# Patient Record
Sex: MALE | Race: WHITE | Employment: OTHER | URBAN - METROPOLITAN AREA
[De-identification: names, ages, dates, MRNs, and addresses within clinical notes are randomized per-mention and may not be internally consistent; named-entity substitution may affect disease eponyms.]

---

## 2019-09-30 ENCOUNTER — HOSPITAL ENCOUNTER (OUTPATIENT)
Dept: MRI IMAGING | Age: 69
Discharge: HOME | End: 2019-09-30
Payer: MEDICARE

## 2019-09-30 DIAGNOSIS — M54.50 LUMBAGO: ICD-10-CM

## 2019-09-30 DIAGNOSIS — M54.16 LUMBAR RADICULOPATHY: ICD-10-CM

## 2019-09-30 PROCEDURE — 72148 MRI LUMBAR SPINE W/O DYE: CPT

## 2020-08-31 ENCOUNTER — OFFICE VISIT (OUTPATIENT)
Dept: INTERNAL MEDICINE CLINIC | Age: 70
End: 2020-08-31
Payer: MEDICARE

## 2020-08-31 VITALS
DIASTOLIC BLOOD PRESSURE: 85 MMHG | HEART RATE: 76 BPM | SYSTOLIC BLOOD PRESSURE: 126 MMHG | TEMPERATURE: 99.1 F | BODY MASS INDEX: 36.45 KG/M2 | WEIGHT: 315 LBS | OXYGEN SATURATION: 96 % | RESPIRATION RATE: 12 BRPM | HEIGHT: 78 IN

## 2020-08-31 DIAGNOSIS — L40.9 PSORIASIS: ICD-10-CM

## 2020-08-31 DIAGNOSIS — N40.1 BENIGN PROSTATIC HYPERPLASIA WITH INCOMPLETE BLADDER EMPTYING: ICD-10-CM

## 2020-08-31 DIAGNOSIS — F51.01 PRIMARY INSOMNIA: ICD-10-CM

## 2020-08-31 DIAGNOSIS — M45.0 ANKYLOSING SPONDYLITIS OF MULTIPLE SITES IN SPINE (HCC): Primary | ICD-10-CM

## 2020-08-31 DIAGNOSIS — R39.14 BENIGN PROSTATIC HYPERPLASIA WITH INCOMPLETE BLADDER EMPTYING: ICD-10-CM

## 2020-08-31 DIAGNOSIS — E66.01 OBESITY, MORBID (HCC): ICD-10-CM

## 2020-08-31 DIAGNOSIS — I10 BENIGN ESSENTIAL HTN: ICD-10-CM

## 2020-08-31 DIAGNOSIS — G62.9 NEUROPATHY: ICD-10-CM

## 2020-08-31 PROCEDURE — G8432 DEP SCR NOT DOC, RNG: HCPCS | Performed by: INTERNAL MEDICINE

## 2020-08-31 PROCEDURE — G8417 CALC BMI ABV UP PARAM F/U: HCPCS | Performed by: INTERNAL MEDICINE

## 2020-08-31 PROCEDURE — 99204 OFFICE O/P NEW MOD 45 MIN: CPT | Performed by: INTERNAL MEDICINE

## 2020-08-31 PROCEDURE — G0463 HOSPITAL OUTPT CLINIC VISIT: HCPCS | Performed by: INTERNAL MEDICINE

## 2020-08-31 PROCEDURE — G8427 DOCREV CUR MEDS BY ELIG CLIN: HCPCS | Performed by: INTERNAL MEDICINE

## 2020-08-31 PROCEDURE — G8536 NO DOC ELDER MAL SCRN: HCPCS | Performed by: INTERNAL MEDICINE

## 2020-08-31 PROCEDURE — 1101F PT FALLS ASSESS-DOCD LE1/YR: CPT | Performed by: INTERNAL MEDICINE

## 2020-08-31 PROCEDURE — 3017F COLORECTAL CA SCREEN DOC REV: CPT | Performed by: INTERNAL MEDICINE

## 2020-08-31 RX ORDER — MELATONIN
1000 DAILY
Qty: 30 TAB | Refills: 11
Start: 2020-08-31 | End: 2022-02-01

## 2020-08-31 RX ORDER — BETAMETHASONE DIPROPIONATE 0.5 MG/G
OINTMENT TOPICAL AS NEEDED
COMMUNITY

## 2020-08-31 RX ORDER — LANOLIN ALCOHOL/MO/W.PET/CERES
1000 CREAM (GRAM) TOPICAL DAILY
Qty: 30 TAB | Refills: 11
Start: 2020-08-31 | End: 2022-02-01

## 2020-08-31 RX ORDER — TAMSULOSIN HYDROCHLORIDE 0.4 MG/1
0.4 CAPSULE ORAL DAILY
COMMUNITY
Start: 2019-07-30

## 2020-08-31 RX ORDER — FOLIC ACID 1 MG/1
TABLET ORAL
COMMUNITY
Start: 2020-07-20

## 2020-08-31 RX ORDER — VALSARTAN AND HYDROCHLOROTHIAZIDE 160; 12.5 MG/1; MG/1
1 TABLET, FILM COATED ORAL DAILY
Qty: 90 TAB | Refills: 3 | Status: SHIPPED | OUTPATIENT
Start: 2020-08-31 | End: 2021-09-03

## 2020-08-31 NOTE — PROGRESS NOTES
HISTORY OF PRESENT ILLNESS    New patient to my practice, referred to me by his Bro-in-law Arlyn Mclaughlin . Prior medical care has been from Dr. Yusra Hartman. he works as Retired on Disability from [employer] since 2003. his  past medical history was reviewed, discussed, and summarized in the list below. Single, never . No children. Helps his sister care for their 81 yo mother. Doing fairly well,  but with several chronic significant issues. He is followed closely by rheumatology Dr. Roro Dowd for ankylosing spondylitis of his neck and lumbar spine. Is currently maintained on methotrexate and Remicade injections with PRN diclofenac. Chronic back pain is followed by Dr. Kamille Merino. He has had a history of nerve ablation and several injections. Plans to follow-up again soon. Says that pool therapy helped a lot and he looks forward to getting back into the pool as soon as he can. BPH. Symptoms have remained stable. Taking Flomax. Blood work done by his rheumatologist, Dr. Roro Dowd on May 1, 2020. WBC 6.1, Hgb 14.5, platelet 797  Sodium 138, potassium 3.9, glucose 86, BUN 18, creatinine 0.7, calcium 9.2, liver enzymes normal cholesterol 158, HDL 47, triglyceride 133, LDL 84  PSA 0.6    Hypertension  Hypertension ROS: taking medications as instructed, no medication side effects noted, no TIA's, no chest pain on exertion, no dyspnea on exertion, no swelling of ankles     reports that he quit smoking about 34 years ago. He has never used smokeless tobacco.    reports current alcohol use. BP Readings from Last 2 Encounters:   08/31/20 126/85   04/09/15 126/79       Review of Systems   All other systems reviewed and are negative, except as noted in HPI      Past Medical and Surgical History  Past Medical History:   Diagnosis Date    Ankylosing spondylitis ([facility] [address] 1977    Dr. Roro Dowd.  neck, lumbar. sees pain management    Benign essential HTN     BPH (benign prostatic hyperplasia)     Dr. Gabe Michael. PSA 0.6 5/1/20    Chronic edema     compression stockings    Colon polyp     hx.  last colonoscopy 2019.  DJD (degenerative joint disease) of cervical spine     Dr. Andry Cho History of basal cell cancer 2013    face- Dr. Diane De León. MOHS    History of bilateral knee replacement     Dr. Stephen Almanza    History of DVT (deep vein thrombosis)     left leg.  Insomnia     takes ambien per Dr. Karine Romo Kidney stone     Lumbar spinal stenosis 2011    Dr Virgie Salinas, Dr. Marylene Fujisawa. MRI 9/2017. hx of injections, nerve ablation    Neuropathy     feet. due to spinal dz?  saw neurology. hx GTT    JUDY on CPAP     Dr. Josefina Verdugo         has a past surgical history that includes hx shoulder arthroscopy (Left); hx wrist fracture tx (Right); hx lithotripsy; hx knee replacement (Right); hx knee replacement (Left); hx mohs procedure (2013?); hx colonoscopy (2019); and hx colonoscopy. Current Outpatient Medications   Medication Sig    tamsulosin (FLOMAX) 0.4 mg capsule Take  by mouth daily.  folic acid (FOLVITE) 1 mg tablet TAKE 1 TABLET BY MOUTH EVERY DAY    augmented betamethasone dipropionate (DIPROLENE-AF) 0.05 % ointment Apply  to affected area as needed for Skin Irritation.  cyanocobalamin 1,000 mcg tablet Take 1 Tab by mouth daily.  cholecalciferol (VITAMIN D3) (1000 Units /25 mcg) tablet Take 1 Tab by mouth daily.  valsartan-hydroCHLOROthiazide (Diovan HCT) 160-12.5 mg per tablet Take 1 Tab by mouth daily.  methotrexate, PF, 10 mg/0.2 mL atIn Injection weekly    INFLIXIMAB (REMICADE IV) by IntraVENous route.  diclofenac EC (VOLTAREN) 75 mg EC tablet Take 75 mg by mouth. No current facility-administered medications for this visit. reports that he quit smoking about 34 years ago. He has never used smokeless tobacco. He reports current alcohol use. He reports that he does not use drugs. family history includes Arthritis-osteo in his father and sister;  Heart Disease in his father. Physical Exam   Nursing note and vitals reviewed. Blood pressure 126/85, pulse 76, temperature 99.1 °F (37.3 °C), temperature source Oral, resp. rate 12, height 6' 8\" (2.032 m), weight (!) 393 lb 6.4 oz (178.4 kg), SpO2 96 %. Constitutional: oriented to person, place, and time. No distress. Eyes: Conjunctivae are normal.   HEENT:  No cervical lymphadenopathy. No thyroid nodules or goiter  Cardiovascular: Normal rate. Regular rhythm, no murmurs, rubs. No edema  Pulmonary/Chest: Effort normal. clear to auscultation  Abdominal: soft, non-tender, non-distended  Musculoskeletal:     Neurological: Alert and oriented to person, place. Cranial nerves grossly intact. Antalgic gait. Skin: No rash noted. Psychiatric: Normal mood and affect. Behavior is normal.     ASSESSMENT and PLAN  Diagnoses and all orders for this visit:    1. Ankylosing spondylitis of multiple sites in spine ([facility] [address]  Chronic back and neck pain  This is his most pressing issue. Weight loss would reduce pain and I have strongly recommended that he return back to the pool. He is going to follow-up with Dr. Aleja Mendenhall about further interventions. -     methotrexate, PF, 10 mg/0.2 mL atIn; Injection weekly    2. Benign essential HTN  Blood pressure remains controlled. Continue current medication.  -     valsartan-hydroCHLOROthiazide (Diovan HCT) 160-12.5 mg per tablet; Take 1 Tab by mouth daily. 3. Benign prostatic hyperplasia with incomplete bladder emptying  Symptoms are stable on Flomax and PSA is stable. I am happy to follow this yearly or he can follow-up with urology. 4. Obesity, morbid ([facility] [address]  The patient is asked to make an attempt to improve diet and exercise patterns to aid in medical management of this problem. 5. Neuropathy  Chronic neuropathy. Related to ankylosing spondylitis? He is resuming a B12 supplement  -     cyanocobalamin 1,000 mcg tablet; Take 1 Tab by mouth daily.     6. Primary insomnia  Taking Ambien sparingly   As prescribed by rheumatology at this point.       lab results and schedule of future lab studies reviewed with patient  reviewed medications and side effects in detail

## 2021-04-15 LAB
CREATININE, EXTERNAL: 0.7
PSA, EXTERNAL: 0.3

## 2021-06-02 ENCOUNTER — OFFICE VISIT (OUTPATIENT)
Dept: INTERNAL MEDICINE CLINIC | Age: 71
End: 2021-06-02
Payer: MEDICARE

## 2021-06-02 VITALS
SYSTOLIC BLOOD PRESSURE: 138 MMHG | TEMPERATURE: 98.2 F | DIASTOLIC BLOOD PRESSURE: 86 MMHG | RESPIRATION RATE: 14 BRPM | BODY MASS INDEX: 36.45 KG/M2 | HEIGHT: 78 IN | OXYGEN SATURATION: 95 % | HEART RATE: 92 BPM | WEIGHT: 315 LBS

## 2021-06-02 DIAGNOSIS — I10 BENIGN ESSENTIAL HTN: Primary | ICD-10-CM

## 2021-06-02 DIAGNOSIS — R60.0 EDEMA OF BOTH LEGS: ICD-10-CM

## 2021-06-02 DIAGNOSIS — E66.01 OBESITY, MORBID (HCC): ICD-10-CM

## 2021-06-02 DIAGNOSIS — M45.0 ANKYLOSING SPONDYLITIS OF MULTIPLE SITES IN SPINE (HCC): ICD-10-CM

## 2021-06-02 PROCEDURE — G8510 SCR DEP NEG, NO PLAN REQD: HCPCS | Performed by: INTERNAL MEDICINE

## 2021-06-02 PROCEDURE — 1101F PT FALLS ASSESS-DOCD LE1/YR: CPT | Performed by: INTERNAL MEDICINE

## 2021-06-02 PROCEDURE — G8536 NO DOC ELDER MAL SCRN: HCPCS | Performed by: INTERNAL MEDICINE

## 2021-06-02 PROCEDURE — G8417 CALC BMI ABV UP PARAM F/U: HCPCS | Performed by: INTERNAL MEDICINE

## 2021-06-02 PROCEDURE — 99213 OFFICE O/P EST LOW 20 MIN: CPT | Performed by: INTERNAL MEDICINE

## 2021-06-02 PROCEDURE — G8754 DIAS BP LESS 90: HCPCS | Performed by: INTERNAL MEDICINE

## 2021-06-02 PROCEDURE — 3017F COLORECTAL CA SCREEN DOC REV: CPT | Performed by: INTERNAL MEDICINE

## 2021-06-02 PROCEDURE — G8752 SYS BP LESS 140: HCPCS | Performed by: INTERNAL MEDICINE

## 2021-06-02 PROCEDURE — G8427 DOCREV CUR MEDS BY ELIG CLIN: HCPCS | Performed by: INTERNAL MEDICINE

## 2021-06-02 PROCEDURE — G0463 HOSPITAL OUTPT CLINIC VISIT: HCPCS | Performed by: INTERNAL MEDICINE

## 2021-06-02 RX ORDER — FUROSEMIDE 20 MG/1
20 TABLET ORAL
Qty: 30 TABLET | Refills: 2 | Status: SHIPPED | OUTPATIENT
Start: 2021-06-02 | End: 2021-08-31

## 2021-06-02 NOTE — PROGRESS NOTES
HISTORY OF PRESENT ILLNESS    Chief Complaint   Patient presents with    Results     Discuss labs from rheumatologist.        Presents for follow-up    Labs done Dr. Sarthak Vaughn. He would like to review. Unfortunately, I do not have these results to review with him. Seeing Dr. Cooper Goode for back pains. Injections did not help. Given gabapentin 5/13/21 as a trial and felt some nausea, gas after 7 days and stopped it. Taking diclofenac 75 mg bid and prn norco from Dr. Sarthak Vaughn (filled #90 4/15/21)  He was recommended to see Dr. Kathy Reddy for f/u, but he is not sure he would want surgery since it is unlikely to be helpful. .      Edema at times. Took diuretic in the past prn. Occured recently when fishing. Hypertension  Hypertension ROS: taking medications as instructed, no medication side effects noted, no TIA's, no chest pain on exertion, no dyspnea on exertion, no swelling of ankles     reports that he quit smoking about 35 years ago. He has never used smokeless tobacco.    reports current alcohol use. BP Readings from Last 2 Encounters:   06/02/21 138/86   08/31/20 126/85       Review of Systems   All other systems reviewed and are negative, except as noted in HPI    Past Medical and Surgical History   has a past medical history of Ankylosing spondylitis ([facility] [address] (1977), Benign essential HTN, BPH (benign prostatic hyperplasia), Chronic edema, Colon polyp, DJD (degenerative joint disease) of cervical spine, History of basal cell cancer (2013), History of bilateral knee replacement, History of DVT (deep vein thrombosis), Insomnia, Kidney stone, Lumbar spinal stenosis (2011), Neuropathy, JUDY on CPAP, and Psoriasis. has a past surgical history that includes hx shoulder arthroscopy (Left); hx wrist fracture tx (Right); hx lithotripsy; hx knee replacement (Right); hx knee replacement (Left); hx mohs procedure (2013?); hx colonoscopy (10/15/2019); and hx colonoscopy. reports that he quit smoking about 35 years ago. He has never used smokeless tobacco. He reports current alcohol use. He reports that he does not use drugs. family history includes Arthritis-osteo in his father and sister; Heart Disease in his father. Physical Exam   Nursing note and vitals reviewed. Blood pressure 138/86, pulse 92, temperature 98.2 °F (36.8 °C), temperature source Oral, resp. rate 14, height 6' 8\" (2.032 m), weight (!) 403 lb 12.8 oz (183.2 kg), SpO2 95 %. Constitutional:  No distress. Eyes: Conjunctivae are normal.   Ears:  Hearing grossly intact  Cardiovascular: Normal rate. regular rhythm, no murmurs or gallops  1+ edema  Pulmonary/Chest: Effort normal.   CTAB  Musculoskeletal: moves all 4 extremities   Neurological: Alert and oriented to person, place, and time. Skin: No visible rash noted. Psychiatric: Normal mood and affect. Behavior is normal.     ASSESSMENT and PLAN  Diagnoses and all orders for this visit:    Requested lab results from Dr No Piña, but they were not received in time for this appointment. I will call him to discuss them once received    1. Benign essential HTN  Controlled, but has Intermittent edema. Add lasix prn. Decrease salt intake. Consider increasing hctz daily  -     furosemide (LASIX) 20 mg tablet; Take 1 Tablet by mouth daily as needed (for swelling (edema). Take for 3 days max). 2. Ankylosing spondylitis of multiple sites in spine ([facility] [address]  tx per Dr. Garry Piña. Consider ortho consult. 3. Obesity, morbid ([facility] [address]  The patient is asked to make an attempt to improve diet and exercise patterns to aid in medical management of this problem. 4. Edema of both legs  -     furosemide (LASIX) 20 mg tablet; Take 1 Tablet by mouth daily as needed (for swelling (edema). Take for 3 days max).     lab results and schedule of future lab studies reviewed with patient  reviewed medications and side effects in detail    Return to clinic for further evaluation if new symptoms develop        Current Outpatient Medications   Medication Sig    furosemide (LASIX) 20 mg tablet Take 1 Tablet by mouth daily as needed (for swelling (edema). Take for 3 days max).  tamsulosin (FLOMAX) 0.4 mg capsule Take  by mouth daily.  folic acid (FOLVITE) 1 mg tablet TAKE 1 TABLET BY MOUTH EVERY DAY    augmented betamethasone dipropionate (DIPROLENE-AF) 0.05 % ointment Apply  to affected area as needed for Skin Irritation.  cyanocobalamin 1,000 mcg tablet Take 1 Tab by mouth daily.  cholecalciferol (VITAMIN D3) (1000 Units /25 mcg) tablet Take 1 Tab by mouth daily.  valsartan-hydroCHLOROthiazide (Diovan HCT) 160-12.5 mg per tablet Take 1 Tab by mouth daily.  methotrexate, PF, 10 mg/0.2 mL atIn Injection weekly    INFLIXIMAB (REMICADE IV) by IntraVENous route.  diclofenac EC (VOLTAREN) 75 mg EC tablet Take 75 mg by mouth. No current facility-administered medications for this visit.        Addendum:  Labs received 6/3/21 from Dr. Nadege Reis, drawn 4/15/21  Psa 0.3  Alk phos 58, AST 21, ALT 23 alb 3.7  Bili 0.5  Na 139, BUN 13 Cr 0.7 Ca 9.0  Gluc 93  WBC 6.9  hgb 13.9  Platelets 603  Chol 590   HDL 39  CRP < 5.0  ESR 13

## 2021-07-14 ENCOUNTER — TRANSCRIBE ORDER (OUTPATIENT)
Dept: SCHEDULING | Age: 71
End: 2021-07-14

## 2021-07-14 DIAGNOSIS — M48.062 SPINAL STENOSIS, LUMBAR REGION, WITH NEUROGENIC CLAUDICATION: Primary | ICD-10-CM

## 2021-08-31 DIAGNOSIS — R60.0 EDEMA OF BOTH LEGS: ICD-10-CM

## 2021-08-31 DIAGNOSIS — I10 BENIGN ESSENTIAL HTN: ICD-10-CM

## 2021-08-31 RX ORDER — FUROSEMIDE 20 MG/1
20 TABLET ORAL
Qty: 90 TABLET | Refills: 1 | Status: SHIPPED | OUTPATIENT
Start: 2021-08-31 | End: 2022-03-07

## 2021-09-03 DIAGNOSIS — I10 BENIGN ESSENTIAL HTN: ICD-10-CM

## 2021-09-03 RX ORDER — VALSARTAN AND HYDROCHLOROTHIAZIDE 160; 12.5 MG/1; MG/1
TABLET, FILM COATED ORAL
Qty: 90 TABLET | Refills: 3 | Status: SHIPPED | OUTPATIENT
Start: 2021-09-03 | End: 2022-04-15 | Stop reason: ALTCHOICE

## 2021-09-21 ENCOUNTER — HOSPITAL ENCOUNTER (OUTPATIENT)
Dept: MRI IMAGING | Age: 71
Discharge: HOME | End: 2021-09-21
Attending: ORTHOPAEDIC SURGERY
Payer: MEDICARE

## 2021-09-21 DIAGNOSIS — M48.062 SPINAL STENOSIS, LUMBAR REGION, WITH NEUROGENIC CLAUDICATION: ICD-10-CM

## 2021-09-21 PROCEDURE — 72148 MRI LUMBAR SPINE W/O DYE: CPT

## 2021-10-14 ENCOUNTER — TRANSCRIBE ORDER (OUTPATIENT)
Dept: SCHEDULING | Age: 71
End: 2021-10-14

## 2021-10-14 DIAGNOSIS — N28.1 ACQUIRED CYST OF KIDNEY: Primary | ICD-10-CM

## 2021-11-20 LAB — SARS-COV-2, NAA: NOT DETECTED

## 2021-12-06 ENCOUNTER — NURSE TRIAGE (OUTPATIENT)
Dept: OTHER | Facility: CLINIC | Age: 71
End: 2021-12-06

## 2021-12-06 ENCOUNTER — TELEPHONE (OUTPATIENT)
Dept: INTERNAL MEDICINE CLINIC | Age: 71
End: 2021-12-06

## 2021-12-06 NOTE — TELEPHONE ENCOUNTER
Received voicemail from Mireya at [facility], caller not on line. Complaint: increased BP/dizzy    Market: Annetta Siddiqui Name: SURESH Green    Caller's telephone number verified as [phone]    Connected with caller via phone, please see below triage      Brief description of triage: Pt with elevated BP. Was seen a few weeks ago and BP at that time was 150/?. He does not check BP at home. Has been having lightheadedness, fatigue and sob at times. Triage indicates for patient to see today in office. Care advice provided, patient verbalizes understanding; denies any other questions or concerns; instructed to call back for any new or worsening symptoms. Writer provided message in TEAMs to call and schedule appt. Attention Provider: Thank you for allowing me to participate in the care of your patient. The patient was connected to triage in response to information provided to the [facility]. Please do not respond through this encounter as the response is not directed to a shared pool. Reason for Disposition   Patient wants to be seen    Answer Assessment - Initial Assessment Questions  1. BLOOD PRESSURE: \"What is the blood pressure? \" \"Did you take at least two measurements 5 minutes apart? \"      BP was high when he went for a covid test a few weeks ago  Last BP a few weeks ago was 150/?    2. ONSET: \"When did you take your blood pressure? \"      Few weeks    3. HOW: \"How did you obtain the blood pressure? \" (e.g., visiting nurse, automatic home BP monitor)      No BP machine at home    4. HISTORY: \"Do you have a history of high blood pressure? \"    Has HTN    5. MEDICATIONS: Juliano Blazer you taking any medications for blood pressure? \" \"Have you missed any doses recently? \"     Is on BP medication    6. OTHER SYMPTOMS: \"Do you have any symptoms? \" (e.g., headache, chest pain, blurred vision, difficulty breathing, weakness)      Has been feeling lightheaded at times  Will also feel fatigued and sob at times    7. PREGNANCY: \"Is there any chance you are pregnant? \" \"When was your last menstrual period? \"      na    Protocols used: HIGH BLOOD PRESSURE-ADULT-[place]

## 2021-12-06 NOTE — TELEPHONE ENCOUNTER
Spoke with patient and he reports that he is going out of town on Thursday and is concerned because his B/P has been elevated and he requests an appointment to discuss with Dr. Juan Tidwell 45 his medications. and he has had some numbness in his hand. He reports that the numbness is not new. Pt reports he was exposed to COVID 4 weeks ago but he has had no symptoms. He denies any respiratory symptoms and no fever. He reports that he goes to infusion center for Remicade and they said his B/P's have been in an acceptable range. Advised patient to obtain a B/P machine to monitor his B/P's at home. Patient states \" I'm not interested in doing that\". Appt scheduled for 12/8/21 @3475. Patient grateful for  The help.

## 2021-12-08 ENCOUNTER — OFFICE VISIT (OUTPATIENT)
Dept: INTERNAL MEDICINE CLINIC | Age: 71
End: 2021-12-08
Payer: MEDICARE

## 2021-12-08 VITALS
WEIGHT: 315 LBS | HEART RATE: 81 BPM | DIASTOLIC BLOOD PRESSURE: 83 MMHG | HEIGHT: 78 IN | TEMPERATURE: 98.2 F | RESPIRATION RATE: 15 BRPM | OXYGEN SATURATION: 97 % | SYSTOLIC BLOOD PRESSURE: 129 MMHG | BODY MASS INDEX: 36.45 KG/M2

## 2021-12-08 DIAGNOSIS — R20.2 FACIAL PARESTHESIA: ICD-10-CM

## 2021-12-08 DIAGNOSIS — G62.9 NEUROPATHY: ICD-10-CM

## 2021-12-08 DIAGNOSIS — I10 BENIGN ESSENTIAL HTN: ICD-10-CM

## 2021-12-08 DIAGNOSIS — R73.01 IFG (IMPAIRED FASTING GLUCOSE): ICD-10-CM

## 2021-12-08 DIAGNOSIS — R06.09 DOE (DYSPNEA ON EXERTION): Primary | ICD-10-CM

## 2021-12-08 DIAGNOSIS — M48.062 SPINAL STENOSIS OF LUMBAR REGION WITH NEUROGENIC CLAUDICATION: ICD-10-CM

## 2021-12-08 DIAGNOSIS — N40.1 BENIGN PROSTATIC HYPERPLASIA WITH INCOMPLETE BLADDER EMPTYING: ICD-10-CM

## 2021-12-08 DIAGNOSIS — N13.4 HYDROURETER ON LEFT: ICD-10-CM

## 2021-12-08 DIAGNOSIS — R39.14 BENIGN PROSTATIC HYPERPLASIA WITH INCOMPLETE BLADDER EMPTYING: ICD-10-CM

## 2021-12-08 DIAGNOSIS — M45.0 ANKYLOSING SPONDYLITIS OF MULTIPLE SITES IN SPINE (HCC): ICD-10-CM

## 2021-12-08 DIAGNOSIS — R42 VERTIGO: ICD-10-CM

## 2021-12-08 DIAGNOSIS — R53.82 CHRONIC FATIGUE: ICD-10-CM

## 2021-12-08 PROCEDURE — G8536 NO DOC ELDER MAL SCRN: HCPCS | Performed by: INTERNAL MEDICINE

## 2021-12-08 PROCEDURE — 1101F PT FALLS ASSESS-DOCD LE1/YR: CPT | Performed by: INTERNAL MEDICINE

## 2021-12-08 PROCEDURE — G8754 DIAS BP LESS 90: HCPCS | Performed by: INTERNAL MEDICINE

## 2021-12-08 PROCEDURE — G8510 SCR DEP NEG, NO PLAN REQD: HCPCS | Performed by: INTERNAL MEDICINE

## 2021-12-08 PROCEDURE — G8752 SYS BP LESS 140: HCPCS | Performed by: INTERNAL MEDICINE

## 2021-12-08 PROCEDURE — G0463 HOSPITAL OUTPT CLINIC VISIT: HCPCS | Performed by: INTERNAL MEDICINE

## 2021-12-08 PROCEDURE — 99214 OFFICE O/P EST MOD 30 MIN: CPT | Performed by: INTERNAL MEDICINE

## 2021-12-08 PROCEDURE — 3017F COLORECTAL CA SCREEN DOC REV: CPT | Performed by: INTERNAL MEDICINE

## 2021-12-08 PROCEDURE — G8417 CALC BMI ABV UP PARAM F/U: HCPCS | Performed by: INTERNAL MEDICINE

## 2021-12-08 PROCEDURE — G8427 DOCREV CUR MEDS BY ELIG CLIN: HCPCS | Performed by: INTERNAL MEDICINE

## 2021-12-09 LAB
ALBUMIN SERPL-MCNC: 3.1 G/DL (ref 3.5–5)
ALBUMIN/GLOB SERPL: 0.8 {RATIO} (ref 1.1–2.2)
ALP SERPL-CCNC: 54 U/L (ref 45–117)
ALT SERPL-CCNC: 26 U/L (ref 12–78)
ANION GAP SERPL CALC-SCNC: 2 MMOL/L (ref 5–15)
AST SERPL-CCNC: 20 U/L (ref 15–37)
BILIRUB SERPL-MCNC: 0.4 MG/DL (ref 0.2–1)
BUN SERPL-MCNC: 20 MG/DL (ref 6–20)
BUN/CREAT SERPL: 19 (ref 12–20)
CALCIUM SERPL-MCNC: 9.8 MG/DL (ref 8.5–10.1)
CHLORIDE SERPL-SCNC: 107 MMOL/L (ref 97–108)
CHOLEST SERPL-MCNC: 150 MG/DL
CO2 SERPL-SCNC: 29 MMOL/L (ref 21–32)
CREAT SERPL-MCNC: 1.03 MG/DL (ref 0.7–1.3)
ERYTHROCYTE [DISTWIDTH] IN BLOOD BY AUTOMATED COUNT: 14.8 % (ref 11.5–14.5)
EST. AVERAGE GLUCOSE BLD GHB EST-MCNC: 120 MG/DL
FOLATE SERPL-MCNC: 13.5 NG/ML (ref 5–21)
GLOBULIN SER CALC-MCNC: 3.9 G/DL (ref 2–4)
GLUCOSE SERPL-MCNC: 97 MG/DL (ref 65–100)
HBA1C MFR BLD: 5.8 % (ref 4–5.6)
HCT VFR BLD AUTO: 43.6 % (ref 36.6–50.3)
HDLC SERPL-MCNC: 42 MG/DL
HDLC SERPL: 3.6 {RATIO} (ref 0–5)
HGB BLD-MCNC: 13.5 G/DL (ref 12.1–17)
LDLC SERPL CALC-MCNC: 77.2 MG/DL (ref 0–100)
MCH RBC QN AUTO: 25.9 PG (ref 26–34)
MCHC RBC AUTO-ENTMCNC: 31 G/DL (ref 30–36.5)
MCV RBC AUTO: 83.7 FL (ref 80–99)
NRBC # BLD: 0 K/UL (ref 0–0.01)
NRBC BLD-RTO: 0 PER 100 WBC
PLATELET # BLD AUTO: 265 K/UL (ref 150–400)
PMV BLD AUTO: 11.1 FL (ref 8.9–12.9)
POTASSIUM SERPL-SCNC: 4.6 MMOL/L (ref 3.5–5.1)
PROT SERPL-MCNC: 7 G/DL (ref 6.4–8.2)
RBC # BLD AUTO: 5.21 M/UL (ref 4.1–5.7)
SODIUM SERPL-SCNC: 138 MMOL/L (ref 136–145)
TRIGL SERPL-MCNC: 154 MG/DL (ref ?–150)
VIT B12 SERPL-MCNC: 567 PG/ML (ref 193–986)
VLDLC SERPL CALC-MCNC: 30.8 MG/DL
WBC # BLD AUTO: 7.9 K/UL (ref 4.1–11.1)

## 2021-12-09 NOTE — PROGRESS NOTES
HISTORY OF PRESENT ILLNESS    Chief Complaint   Patient presents with    Blood Pressure Check     Elevated b/p - discuss meds.  Dizziness     Also lightheadedness - 2 months - lay in the bed while turning over.  Numbness     In head.  Fatigue    Breathing Problem     SOB. Presents for follow-up    Hypertension  States his blood pressure has been intermittently elevated at times. Hypertension ROS: taking medications as instructed, no medication side effects noted, no TIA's, notes new dyspnea on exertion , no swelling of ankles     reports that he quit smoking about 35 years ago. He has never used smokeless tobacco.    reports current alcohol use. BP Readings from Last 2 Encounters:   12/08/21 129/83   06/02/21 138/86     Reports shortness of breath with exertion. Onset was couple months ago. He does not feel like he is the same endurance. Denies any significant cough or wheezing. He states that he has had a couple of stress tests over the years and they were normal.  May have had a catheterization in 2014 with Dr. Philip Coon. No history of COPD. In rheumatology for ankylosing spondylitis. Is currently maintained on methotrexate, Remicade and diclofenac. Paresthesias of his head. Ongoing for years. Intermittent of his bilateral cheeks, forehead, scalp. No significant changes. He does have a history of cervical spine disease.     Review of Systems   All other systems reviewed and are negative, except as noted in HPI    Past Medical and Surgical History   has a past medical history of Ankylosing spondylitis ([facility] [address] (1977), Benign essential HTN, BPH (benign prostatic hyperplasia), Chronic edema, Colon polyp, DJD (degenerative joint disease) of cervical spine, History of basal cell cancer (2013), History of bilateral knee replacement, History of DVT (deep vein thrombosis), Insomnia, Kidney stone, Lumbar spinal stenosis (2011), Neuropathy, JUDY on CPAP, Psoriasis, and SOB (shortness of breath) (2014). has a past surgical history that includes hx shoulder arthroscopy (Left); hx wrist fracture tx (Right); hx lithotripsy; hx knee replacement (Right); hx knee replacement (Left); hx mohs procedure (2013?); hx colonoscopy (10/15/2019); and hx colonoscopy. reports that he quit smoking about 35 years ago. He has never used smokeless tobacco. He reports current alcohol use. He reports that he does not use drugs. family history includes Heart Disease in his father; OSTEOARTHRITIS in his father and sister. Physical Exam   Nursing note and vitals reviewed. Blood pressure 129/83, pulse 81, temperature 98.2 °F (36.8 °C), temperature source Oral, resp. rate 15, height 6' 8\" (2.032 m), weight (!) 390 lb 6.4 oz (177.1 kg), SpO2 97 %. Constitutional:  No distress. Eyes: Conjunctivae are normal.   Ears:  Hearing grossly intact  Cardiovascular: Normal rate. regular rhythm, no murmurs or gallops  No edema  Pulmonary/Chest: Effort normal.   CTAB  Musculoskeletal: moves all 4 extremities   Neurological: Alert and oriented to person, place, and time. Skin: No visible rash noted. Psychiatric: Normal mood and affect. Behavior is normal.     Assessment and Plan    Diagnoses and all orders for this visit:    1. KING (dyspnea on exertion)  About a decreased exercise tolerance. Check CBC and nuclear stress test.  Cannot walk on treadmill because of chronic pain. No further pulmonary evaluation. He is on methotrexate. Consider chest CT his neck step. -     NUCLEAR CARDIAC STRESS TEST; Future  -     CBC W/O DIFF; Future    2. Chronic fatigue  Mild chronic fatigue. No significant changes. 3. Benign essential HTNs recently controlled on current medications in the office today. Continue to monitor. E    4. Vertigo  He has had some intermittent vertigo. Limited ability to participate in physical therapy because of cervical spine disease. Stretching demonstrated.   He requests duplex of his carotids which I think is reasonable. -     DUPLEX CAROTID BILATERAL; Future    5. Neuropathy  Moderate neuropathy. Check labs. -     VITAMIN B12 & FOLATE; Future  -     METABOLIC PANEL, COMPREHENSIVE; Future  -     HEMOGLOBIN A1C WITH EAG; Future    6. Facial paresthesia  Chronic and stable. Could consider neurology consultation. 7. Spinal stenosis of lumbar region with neurogenic claudication  He will consult with Dr. Lopez Sessions in orthopedics. 8. Ankylosing spondylitis of multiple sites in spine [facility])  Follow-up with rheumatology Dr. Geraldo Evans. Continue on current medications. 9. Benign prostatic hyperplasia with incomplete bladder emptying  -     PSA W/ REFLX FREE PSA; Future    10. Hydroureter on left  He followed up with urology about this and was told nothing significant. 11. IFG (impaired fasting glucose)  -     HEMOGLOBIN A1C WITH EAG; Future    lab results and schedule of future lab studies reviewed with patient  reviewed medications and side effects in detail    Return to clinic for further evaluation if new symptoms develop        Current Outpatient Medications   Medication Sig    valsartan-hydroCHLOROthiazide (DIOVAN-HCT) 160-12.5 mg per tablet TAKE 1 TABLET BY MOUTH EVERY DAY    furosemide (LASIX) 20 mg tablet TAKE 1 TABLET BY MOUTH DAILY AS NEEDED (FOR SWELLING (EDEMA). TAKE FOR 3 DAYS MAX).  tamsulosin (FLOMAX) 0.4 mg capsule Take  by mouth daily.  folic acid (FOLVITE) 1 mg tablet TAKE 1 TABLET BY MOUTH EVERY DAY    augmented betamethasone dipropionate (DIPROLENE-AF) 0.05 % ointment Apply  to affected area as needed for Skin Irritation.  cyanocobalamin 1,000 mcg tablet Take 1 Tab by mouth daily.  cholecalciferol (VITAMIN D3) (1000 Units /25 mcg) tablet Take 1 Tab by mouth daily.  methotrexate, PF, 10 mg/0.2 mL atIn Injection weekly    INFLIXIMAB (REMICADE IV) by IntraVENous route.  diclofenac EC (VOLTAREN) 75 mg EC tablet Take 75 mg by mouth.      No current facility-administered medications for this visit.

## 2021-12-10 ENCOUNTER — HOSPITAL ENCOUNTER (OUTPATIENT)
Dept: VASCULAR SURGERY | Age: 71
Discharge: HOME | End: 2021-12-10
Attending: INTERNAL MEDICINE
Payer: MEDICARE

## 2021-12-10 DIAGNOSIS — R42 VERTIGO: ICD-10-CM

## 2021-12-10 DIAGNOSIS — I10 BENIGN ESSENTIAL HTN: ICD-10-CM

## 2021-12-10 LAB
PSA SERPL-MCNC: 0.5 NG/ML (ref 0–4)
REFLEX CRITERIA: NORMAL

## 2021-12-10 PROCEDURE — 93880 EXTRACRANIAL BILAT STUDY: CPT

## 2021-12-12 LAB
LEFT CCA DIST DIAS: 23.6 CM/S
LEFT CCA DIST SYS: 94.8 CM/S
LEFT CCA PROX DIAS: 29 CM/S
LEFT CCA PROX SYS: 136.8 CM/S
LEFT ECA DIAS: 13.9 CM/S
LEFT ECA SYS: 61.8 CM/S
LEFT ICA MID DIAS: 17 CM/S
LEFT ICA MID SYS: 53.2 CM/S
LEFT ICA PROX DIAS: 13 CM/S
LEFT ICA PROX SYS: 47.9 CM/S
LEFT ICA/CCA SYS: 0.48
LEFT VERTEBRAL DIAS: 9.18 CM/S
LEFT VERTEBRAL SYS: 39.1 CM/S
RIGHT CCA DIST DIAS: 22 CM/S
RIGHT CCA DIST SYS: 82.2 CM/S
RIGHT CCA PROX DIAS: 33.7 CM/S
RIGHT CCA PROX SYS: 124 CM/S
RIGHT ECA DIAS: 15.84 CM/S
RIGHT ECA SYS: 94.5 CM/S
RIGHT ICA DIST DIAS: 26.1 CM/S
RIGHT ICA DIST SYS: 73.8 CM/S
RIGHT ICA MID DIAS: 17 CM/S
RIGHT ICA MID SYS: 53.2 CM/S
RIGHT ICA PROX DIAS: 24.4 CM/S
RIGHT ICA PROX SYS: 84.6 CM/S
RIGHT ICA/CCA SYS: 1
RIGHT VERTEBRAL DIAS: 9.52 CM/S
RIGHT VERTEBRAL SYS: 32.2 CM/S
VAS LEFT SUBCLAVIAN PROX EDV: 3.5 CM/S
VAS LEFT SUBCLAVIAN PROX PSV: 111.2 CM/S
VAS RIGHT SUBCLAVIAN PROX EDV: 0 CM/S
VAS RIGHT SUBCLAVIAN PROX PSV: 62.7 CM/S

## 2021-12-15 ENCOUNTER — HOSPITAL ENCOUNTER (OUTPATIENT)
Dept: NUCLEAR MEDICINE | Age: 71
Discharge: HOME | End: 2021-12-15
Attending: INTERNAL MEDICINE
Payer: MEDICARE

## 2021-12-15 ENCOUNTER — HOSPITAL ENCOUNTER (OUTPATIENT)
Dept: NON INVASIVE DIAGNOSTICS | Age: 71
Discharge: HOME | End: 2021-12-15
Attending: INTERNAL MEDICINE
Payer: MEDICARE

## 2021-12-15 VITALS
HEIGHT: 78 IN | BODY MASS INDEX: 36.45 KG/M2 | DIASTOLIC BLOOD PRESSURE: 82 MMHG | SYSTOLIC BLOOD PRESSURE: 134 MMHG | WEIGHT: 315 LBS

## 2021-12-15 DIAGNOSIS — R06.09 DOE (DYSPNEA ON EXERTION): ICD-10-CM

## 2021-12-15 DIAGNOSIS — I10 BENIGN ESSENTIAL HTN: ICD-10-CM

## 2021-12-15 PROCEDURE — A9500 TC99M SESTAMIBI: HCPCS

## 2021-12-15 PROCEDURE — 74011250636 HC RX REV CODE- 250/636: Performed by: STUDENT IN AN ORGANIZED HEALTH CARE EDUCATION/TRAINING PROGRAM

## 2021-12-15 PROCEDURE — 93017 CV STRESS TEST TRACING ONLY: CPT

## 2021-12-15 RX ORDER — TETRAKIS(2-METHOXYISOBUTYLISOCYANIDE)COPPER(I) TETRAFLUOROBORATE 1 MG/ML
33 INJECTION, POWDER, LYOPHILIZED, FOR SOLUTION INTRAVENOUS
Status: COMPLETED | OUTPATIENT
Start: 2021-12-15 | End: 2021-12-15

## 2021-12-15 RX ADMIN — TETRAKIS(2-METHOXYISOBUTYLISOCYANIDE)COPPER(I) TETRAFLUOROBORATE 33 MILLICURIE: 1 INJECTION, POWDER, LYOPHILIZED, FOR SOLUTION INTRAVENOUS at 09:08

## 2021-12-15 RX ADMIN — REGADENOSON 0.4 MG: 0.08 INJECTION, SOLUTION INTRAVENOUS at 08:45

## 2021-12-16 ENCOUNTER — HOSPITAL ENCOUNTER (OUTPATIENT)
Dept: NUCLEAR MEDICINE | Age: 71
Discharge: HOME | End: 2021-12-16
Attending: INTERNAL MEDICINE

## 2021-12-16 RX ORDER — TETRAKIS(2-METHOXYISOBUTYLISOCYANIDE)COPPER(I) TETRAFLUOROBORATE 1 MG/ML
31 INJECTION, POWDER, LYOPHILIZED, FOR SOLUTION INTRAVENOUS
Status: COMPLETED | OUTPATIENT
Start: 2021-12-16 | End: 2021-12-16

## 2021-12-16 RX ADMIN — TETRAKIS(2-METHOXYISOBUTYLISOCYANIDE)COPPER(I) TETRAFLUOROBORATE 31 MILLICURIE: 1 INJECTION, POWDER, LYOPHILIZED, FOR SOLUTION INTRAVENOUS at 08:24

## 2021-12-17 LAB
NUC STRESS EJECTION FRACTION: 44 %
STRESS BASELINE DIAS BP: 82 MMHG
STRESS BASELINE HR: 75 BPM
STRESS BASELINE ST DEPRESSION: 0 MM
STRESS BASELINE SYS BP: 134 MMHG
STRESS ESTIMATED WORKLOAD: 1 METS
STRESS EXERCISE DUR MIN: NORMAL
STRESS PEAK DIAS BP: 78 MMHG
STRESS PEAK SYS BP: 135 MMHG
STRESS PERCENT HR ACHIEVED: 70 %
STRESS POST PEAK HR: 105 BPM
STRESS RATE PRESSURE PRODUCT: NORMAL BPM*MMHG
STRESS TARGET HR: 151 BPM

## 2021-12-17 PROCEDURE — 78452 HT MUSCLE IMAGE SPECT MULT: CPT | Performed by: STUDENT IN AN ORGANIZED HEALTH CARE EDUCATION/TRAINING PROGRAM

## 2021-12-19 DIAGNOSIS — R06.09 DOE (DYSPNEA ON EXERTION): Primary | ICD-10-CM

## 2021-12-19 DIAGNOSIS — I10 BENIGN ESSENTIAL HTN: ICD-10-CM

## 2021-12-19 DIAGNOSIS — R94.39 POSITIVE CARDIAC STRESS TEST: ICD-10-CM

## 2021-12-20 ENCOUNTER — TELEPHONE (OUTPATIENT)
Dept: INTERNAL MEDICINE CLINIC | Age: 71
End: 2021-12-20

## 2021-12-20 RX ORDER — ASPIRIN 81 MG/1
81 TABLET ORAL DAILY
Qty: 90 TABLET | Refills: 3
Start: 2021-12-20 | End: 2022-02-18

## 2021-12-20 NOTE — TELEPHONE ENCOUNTER
----- Message from Maxx Plata MD sent at 12/19/2021  6:56 PM EST -----  Results reviewed. See MyChart Comment.    Please make appt with cardiology for + stress test.  Needs cath and consult

## 2021-12-20 NOTE — TELEPHONE ENCOUNTER
I spoke to patient. Explained his stress test results which did show a mildly reduced ejection fraction with a mild to moderate perfusion delay, likely consistent with a coronary blockage. Advised that he start a baby aspirin today. Cardiac consultation scheduled for January 6, then will likely need cardiac catheterization. Advised to report to the emergency room if he has chest pain, progressive shortness of breath, dizziness.

## 2021-12-20 NOTE — TELEPHONE ENCOUNTER
Spoke with patient and updated on Dr. Asael Levi recommendations regarding his + stress test. He has an appt scheduled for 1/6/22 with cardiologist.  He is wondering if that is too far out. Patient requests Dr. Juan Irby to give him a brief call. Patient states that he had a cardiac cath approx 16 years ago and is familiar with the process. Advised patient if any symptoms, chest pain, Shortness of breath, arm, neck or jaw pain should occur or worsen that he should go to ER for evaluation.  Dr. Juan Irby notified of request.

## 2022-01-06 ENCOUNTER — OFFICE VISIT (OUTPATIENT)
Dept: CARDIOLOGY CLINIC | Age: 72
End: 2022-01-06
Payer: MEDICARE

## 2022-01-06 VITALS
BODY MASS INDEX: 36.45 KG/M2 | HEART RATE: 96 BPM | WEIGHT: 315 LBS | SYSTOLIC BLOOD PRESSURE: 136 MMHG | HEIGHT: 78 IN | DIASTOLIC BLOOD PRESSURE: 88 MMHG | OXYGEN SATURATION: 96 %

## 2022-01-06 DIAGNOSIS — I10 BENIGN ESSENTIAL HTN: ICD-10-CM

## 2022-01-06 DIAGNOSIS — R94.39 ABNORMAL STRESS TEST: Primary | ICD-10-CM

## 2022-01-06 DIAGNOSIS — E66.01 MORBID OBESITY (HCC): ICD-10-CM

## 2022-01-06 PROCEDURE — 99204 OFFICE O/P NEW MOD 45 MIN: CPT | Performed by: STUDENT IN AN ORGANIZED HEALTH CARE EDUCATION/TRAINING PROGRAM

## 2022-01-06 PROCEDURE — 93010 ELECTROCARDIOGRAM REPORT: CPT | Performed by: STUDENT IN AN ORGANIZED HEALTH CARE EDUCATION/TRAINING PROGRAM

## 2022-01-06 PROCEDURE — G8417 CALC BMI ABV UP PARAM F/U: HCPCS | Performed by: STUDENT IN AN ORGANIZED HEALTH CARE EDUCATION/TRAINING PROGRAM

## 2022-01-06 PROCEDURE — 1101F PT FALLS ASSESS-DOCD LE1/YR: CPT | Performed by: STUDENT IN AN ORGANIZED HEALTH CARE EDUCATION/TRAINING PROGRAM

## 2022-01-06 PROCEDURE — G8754 DIAS BP LESS 90: HCPCS | Performed by: STUDENT IN AN ORGANIZED HEALTH CARE EDUCATION/TRAINING PROGRAM

## 2022-01-06 PROCEDURE — G8432 DEP SCR NOT DOC, RNG: HCPCS | Performed by: STUDENT IN AN ORGANIZED HEALTH CARE EDUCATION/TRAINING PROGRAM

## 2022-01-06 PROCEDURE — G8428 CUR MEDS NOT DOCUMENT: HCPCS | Performed by: STUDENT IN AN ORGANIZED HEALTH CARE EDUCATION/TRAINING PROGRAM

## 2022-01-06 PROCEDURE — 3017F COLORECTAL CA SCREEN DOC REV: CPT | Performed by: STUDENT IN AN ORGANIZED HEALTH CARE EDUCATION/TRAINING PROGRAM

## 2022-01-06 PROCEDURE — G8752 SYS BP LESS 140: HCPCS | Performed by: STUDENT IN AN ORGANIZED HEALTH CARE EDUCATION/TRAINING PROGRAM

## 2022-01-06 PROCEDURE — G8536 NO DOC ELDER MAL SCRN: HCPCS | Performed by: STUDENT IN AN ORGANIZED HEALTH CARE EDUCATION/TRAINING PROGRAM

## 2022-01-06 PROCEDURE — G0463 HOSPITAL OUTPT CLINIC VISIT: HCPCS | Performed by: STUDENT IN AN ORGANIZED HEALTH CARE EDUCATION/TRAINING PROGRAM

## 2022-01-06 PROCEDURE — 93005 ELECTROCARDIOGRAM TRACING: CPT | Performed by: STUDENT IN AN ORGANIZED HEALTH CARE EDUCATION/TRAINING PROGRAM

## 2022-01-06 NOTE — PROGRESS NOTES
[facility] [address], [address] [address]    Office [phone],PGG [phone]           Amanda Patiño is a 80 y.o. male presents to the office for evaluation of abnormal stress test.  Consult requested by Celestina Cruz MD      Assessment/Recommendations:      ICD-10-CM ICD-9-CM    1. Abnormal stress test  R94.39 794.39 ECHO ADULT COMPLETE      CBC WITH AUTOMATED DIFF      METABOLIC PANEL, BASIC   2. Benign essential HTN  I10 401.1 AMB POC EKG ROUTINE W/ 12 LEADS, INTER & REP      ECHO ADULT COMPLETE      CBC WITH AUTOMATED DIFF      METABOLIC PANEL, BASIC   3. Morbid obesity (HCC)  E66.01 278.01 CBC WITH AUTOMATED DIFF      METABOLIC PANEL, BASIC      NOVEL CORONAVIRUS (COVID-19)       Abnormal stress test. Reversible defect in basal inferior lateral wall. Patient without any chest pain or shortness of breath symptoms. He does report increased indigestion. Negative heart catheterization approximately 15 years ago  -Recommend to proceed with echocardiogram and coronary angiography    Risk and benefit of cardiac catheterization/PCI was described in detail to patient.  (risk of vascular access complication with potential surgical intervention for management, stroke, myocardial infarction, emergent open heart surgery and death). Patient wishes to proceed with coronary angiography with possible intervention. Hypertension- stable continue current medical regimen    Morbid obesity    History of psoriatic arthritis and ankylosing spondylitis      Primary Care Physician- Celestina Cruz MD    Follow-up pending above testing        Subjective:  80 y.o. presents to the office for evaluation of abnormal stress test. Recent stress test showed reversible ischemia in the basal inferior lateral walls. No prior history of coronary artery disease. He does have a history of hypertension and morbid obesity.  He is without any ongoing chest pain or chest pressure symptoms. No exertional dyspnea. He does report mild indigestion. Reports about 15 years ago he had a cardiac catheterization which showed normal coronary arteries. No real strong family history of coronary artery disease. Cholesterol is very well controlled with total cholesterol of 150 and LDL of 77 without medical therapy. Past Medical History:   Diagnosis Date    Ankylosing spondylitis ([facility] [address] 1977    Dr. Yulia De La Fuente.  neck, lumbar. sees pain management    Benign essential HTN     BPH (benign prostatic hyperplasia)     Dr. Mazin Dobson. PSA 0.3 4/15/21, 0.6 5/1/20    Chronic edema     compression stockings    Colon polyp     hx.  last colonoscopy 2019.  DJD (degenerative joint disease) of cervical spine     Dr. Barry Done History of basal cell cancer 2013    face- Dr. Gage Members. MOHS    History of bilateral knee replacement     Dr. Smith Ohm    History of DVT (deep vein thrombosis)     left leg.  Insomnia     takes ambien per Dr. Noe Samson Kidney stone     Lumbar spinal stenosis 2011    Dr Malu Parikh, Dr. Sage Michaud. MRI 9/2021, 9/2017. hx of injections, nerve ablation    Neuropathy     feet. due to spinal dz?  saw neurology. hx GTT    JUDY on CPAP     Dr. Patricia Purdy Psoriasis     SOB (shortness of breath) 2014    Dr. Ab Egan abnormal stress. cath 2014 per pt        Past Surgical History:   Procedure Laterality Date    HX COLONOSCOPY  10/15/2019    [email or web address]. [email or web address] normal (report received). repeat 5 years    HX COLONOSCOPY      hx polyps    HX HEART CATHETERIZATION  2014    Dr Barb Martins Left     HX LITHOTRIPSY      HX MOHS PROCEDURES  2013? facial BCC    HX SHOULDER ARTHROSCOPY Left     HX WRIST FRACTURE TX Right          Current Outpatient Medications:     aspirin delayed-release 81 mg tablet, Take 1 Tablet by mouth daily. , Disp: 90 Tablet, Rfl: 3    valsartan-hydroCHLOROthiazide (DIOVAN-HCT) 160-12.5 mg per tablet, TAKE 1 TABLET BY MOUTH EVERY DAY, Disp: 90 Tablet, Rfl: 3    furosemide (LASIX) 20 mg tablet, TAKE 1 TABLET BY MOUTH DAILY AS NEEDED (FOR SWELLING (EDEMA). TAKE FOR 3 DAYS MAX). , Disp: 90 Tablet, Rfl: 1    tamsulosin (FLOMAX) 0.4 mg capsule, Take  by mouth daily. , Disp: , Rfl:     folic acid (FOLVITE) 1 mg tablet, TAKE 1 TABLET BY MOUTH EVERY DAY, Disp: , Rfl:     augmented betamethasone dipropionate (DIPROLENE-AF) 0.05 % ointment, Apply  to affected area as needed for Skin Irritation. , Disp: , Rfl:     cyanocobalamin 1,000 mcg tablet, Take 1 Tab by mouth daily. , Disp: 30 Tab, Rfl: 11    cholecalciferol (VITAMIN D3) (1000 Units /25 mcg) tablet, Take 1 Tab by mouth daily. , Disp: 30 Tab, Rfl: 11    methotrexate, PF, 10 mg/0.2 mL atIn, Injection weekly, Disp: 1 Syringe, Rfl: 5    INFLIXIMAB (REMICADE IV), by IntraVENous route. EVERY SIX WEEKS, Disp: , Rfl:     diclofenac EC (VOLTAREN) 75 mg EC tablet, Take 75 mg by mouth., Disp: , Rfl:     Allergies   Allergen Reactions    Suprax [Cefixime] Other (comments)     Pt states it paralyzed him        Family History   Problem Relation Age of Onset    Heart Disease Father     OSTEOARTHRITIS Father     OSTEOARTHRITIS Sister        Social History     Tobacco Use    Smoking status: Former Smoker     Quit date:      Years since quittin.0    Smokeless tobacco: Never Used   Substance Use Topics    Alcohol use: Yes     Comment: Social     Drug use: Never       Review of Symptoms:  Pertinent Positive:indigestion  Pertinent Negative:No chest pain, dyspnea on exertion, shortness of breath, orthopnea, PND    All Other systems reviewed and are negative for a Comprehensive ROS (10+)    Physical Exam    Blood pressure 136/88, pulse 96, height 6' 8\" (2.032 m), weight (!) 391 lb (177.4 kg), SpO2 96 %. Constitutional:  well-developed and well-nourished. No distress. HENT: Normocephalic. Eyes: No scleral icterus. Neck:  Neck supple. No JVD present.    Pulmonary/Chest: Effort normal and breath sounds normal. No respiratory distress, wheezes or rales. Cardiovascular: Normal rate, regular rhythm, S1 S2 . Exam reveals no gallop and no friction rub. No murmur heard. No edema. Extremities:  Normal muscle tone  Abdominal:   No abnormal distension. Neurological:  Moving all extremities, cranial nerves appear grossly intact. Skin: Skin is not cold. Not diaphoretic. No erythema. Psychiatric:  Grossly normal mood and affect. Intact insight. Objective Data: Investigations personally reviewed and interpreted    EC2022- normal sinus rhythm, first-degree AV block, nonspecific ST-T wave changes          Investigations reviewed     12/15/21    NUCLEAR CARDIAC STRESS TEST 2021    Interpretation Summary    Nuclear Findings: There is a moderate severity left ventricular stress perfusion defect that is small to medium in size present in the basal to mid inferolateral segment(s) that is partially reversible. The defect is consistent with abnormal perfusion in the LCx territory.   Nuclear Findings: Abnormal left ventricular systolic function post-stress. Global function is mildly reduced. No regional wall motion abnormalities during stress. LVEF measures 44%.   Nuclear Findings: The study is most consistent with myocardial ischemia.   ECG: Resting ECG demonstrates normal sinus rhythm.   Stress Test: A pharmacological stress test was performed using regadenoson. PO caffeine given as a reversal agent at minute 3 of recovery. Blood pressure demonstrated a normal response and heart rate demonstrated a normal response to stress. The patient reported dyspnea during the stress test. The patient reached the end of the protocol.   Resting ECG: ECG is normal. The ECG shows normal sinus rhythm. Resting ECG shows no ST-segment deviation.   Stress ECG: Arrhythmias during stress: rare PVCs. No significant ST changes noted. Arrhythmias during recovery: rare PVCs. couplet    Signed by: Katie Carlton DO on 12/17/2021  1:52 PM          Guru Reyes DO          ATTENTION:   This medical record was transcribed using an electronic medical records/speech recognition system. Although proofread, it may and can contain electronic, spelling and other errors. Corrections may be executed at a later time. Please feel free to contact us for any clarifications as needed.

## 2022-01-06 NOTE — PROGRESS NOTES
Charo Robles is a 80 y.o. male    Visit Vitals  /88 (BP 1 Location: Left upper arm, BP Patient Position: Sitting, BP Cuff Size: Adult)   Pulse 96   Ht 6' 8\" (2.032 m)   Wt (!) 391 lb (177.4 kg)   SpO2 96%   BMI 42.95 kg/m²       Chief Complaint   Patient presents with    Other     ABNORMAL STRESS TEST       Chest pain NO  SOB YES  Dizziness NO  Swelling LEGS  Recent hospital visit NO  Refills NO  COVID VACCINE STATUS YES  HAD COVID?  NO

## 2022-01-06 NOTE — LETTER
1/7/2022    Patient: Cydney Arndt. YOB: 1950   Date of Visit: 1/6/2022     Price Suarez, [address] [facility]  [place]    Dear Price Suarez MD,      Thank you for referring Mr. Nuha Madison to [address] for evaluation. My notes for this consultation are attached. If you have questions, please do not hesitate to call me. I look forward to following your patient along with you.       Sincerely,    Mckenzie Sage, DO

## 2022-01-10 ENCOUNTER — HOSPITAL ENCOUNTER (OUTPATIENT)
Dept: LAB | Age: 72
Discharge: HOME | End: 2022-01-10
Payer: MEDICARE

## 2022-01-10 ENCOUNTER — TRANSCRIBE ORDER (OUTPATIENT)
Dept: REGISTRATION | Age: 72
End: 2022-01-10

## 2022-01-10 DIAGNOSIS — Z01.812 PRE-PROCEDURAL LABORATORY EXAMINATIONS: ICD-10-CM

## 2022-01-10 DIAGNOSIS — Z01.812 PRE-PROCEDURAL LABORATORY EXAMINATIONS: Primary | ICD-10-CM

## 2022-01-10 PROCEDURE — U0005 INFEC AGEN DETEC AMPLI PROBE: HCPCS

## 2022-01-11 LAB
SARS-COV-2, XPLCVT: NOT DETECTED
SOURCE, COVRS: NORMAL

## 2022-01-12 ENCOUNTER — ANCILLARY PROCEDURE (OUTPATIENT)
Dept: CARDIOLOGY CLINIC | Age: 72
End: 2022-01-12
Payer: MEDICARE

## 2022-01-12 VITALS
HEIGHT: 78 IN | BODY MASS INDEX: 36.45 KG/M2 | WEIGHT: 315 LBS | DIASTOLIC BLOOD PRESSURE: 78 MMHG | SYSTOLIC BLOOD PRESSURE: 120 MMHG

## 2022-01-12 DIAGNOSIS — I10 BENIGN ESSENTIAL HTN: ICD-10-CM

## 2022-01-12 DIAGNOSIS — R94.39 ABNORMAL STRESS TEST: ICD-10-CM

## 2022-01-12 DIAGNOSIS — R94.39 ABNORMAL STRESS TEST: Primary | ICD-10-CM

## 2022-01-12 LAB
ECHO AO ASC DIAM: 3.7 CM
ECHO AO ASCENDING AORTA INDEX: 1.21 CM/M2
ECHO AO ROOT DIAM: 4 CM
ECHO AO ROOT INDEX: 1.31 CM/M2
ECHO AV AREA PEAK VELOCITY: 3.6 CM2
ECHO AV AREA VTI: 3.2 CM2
ECHO AV AREA VTI: 3.5 CM2
ECHO AV AREA VTI: 3.8 CM2
ECHO AV MEAN GRADIENT: 4 MMHG
ECHO AV MEAN VELOCITY: 1 M/S
ECHO AV PEAK GRADIENT: 9 MMHG
ECHO AV PEAK VELOCITY: 1.5 M/S
ECHO AV VELOCITY RATIO: 0.67
ECHO AV VTI: 30.4 CM
ECHO EST RA PRESSURE: 3 MMHG
ECHO LA DIAMETER INDEX: 1.21 CM/M2
ECHO LA DIAMETER: 3.7 CM
ECHO LA TO AORTIC ROOT RATIO: 0.93
ECHO LA VOL 2C: 99 ML (ref 18–58)
ECHO LA VOL 4C: 92 ML (ref 18–58)
ECHO LA VOL BP: 97 ML (ref 18–58)
ECHO LA VOL/BSA BIPLANE: 32 ML/M2 (ref 16–34)
ECHO LA VOLUME AREA LENGTH: 103 ML
ECHO LA VOLUME INDEX A2C: 32 ML/M2 (ref 16–34)
ECHO LA VOLUME INDEX A4C: 30 ML/M2 (ref 16–34)
ECHO LA VOLUME INDEX AREA LENGTH: 34 ML/M2 (ref 16–34)
ECHO LV E' LATERAL VELOCITY: 10 CM/S
ECHO LV E' SEPTAL VELOCITY: 8 CM/S
ECHO LV FRACTIONAL SHORTENING: 39 % (ref 28–44)
ECHO LV INTERNAL DIMENSION DIASTOLE INDEX: 2.09 CM/M2
ECHO LV INTERNAL DIMENSION DIASTOLIC: 6.4 CM (ref 4.2–5.9)
ECHO LV INTERNAL DIMENSION SYSTOLIC INDEX: 1.27 CM/M2
ECHO LV INTERNAL DIMENSION SYSTOLIC: 3.9 CM
ECHO LV IVSD: 1 CM (ref 0.6–1)
ECHO LV MASS 2D: 258.2 G (ref 88–224)
ECHO LV MASS INDEX 2D: 84.4 G/M2 (ref 49–115)
ECHO LV POSTERIOR WALL DIASTOLIC: 0.9 CM (ref 0.6–1)
ECHO LV RELATIVE WALL THICKNESS RATIO: 0.28
ECHO LVOT AREA: 5.3 CM2
ECHO LVOT AV VTI INDEX: 0.64
ECHO LVOT DIAM: 2.6 CM
ECHO LVOT MEAN GRADIENT: 2 MMHG
ECHO LVOT PEAK GRADIENT: 4 MMHG
ECHO LVOT PEAK VELOCITY: 1 M/S
ECHO LVOT STROKE VOLUME INDEX: 33.8 ML/M2
ECHO LVOT SV: 103.5 ML
ECHO LVOT VTI: 19.5 CM
ECHO MV A VELOCITY: 0.76 M/S
ECHO MV AREA PHT: 3.4 CM2
ECHO MV E DECELERATION TIME (DT): 220.1 MS
ECHO MV E VELOCITY: 0.67 M/S
ECHO MV E/A RATIO: 0.88
ECHO MV E/E' LATERAL: 6.7
ECHO MV E/E' RATIO (AVERAGED): 7.54
ECHO MV E/E' SEPTAL: 8.38
ECHO MV PRESSURE HALF TIME (PHT): 63.8 MS
ECHO RIGHT VENTRICULAR SYSTOLIC PRESSURE (RVSP): 19 MMHG
ECHO RV FREE WALL PEAK S': 10 CM/S
ECHO RV INTERNAL DIMENSION: 3.8 CM
ECHO TV REGURGITANT MAX VELOCITY: 2 M/S
ECHO TV REGURGITANT PEAK GRADIENT: 16 MMHG

## 2022-01-12 PROCEDURE — 93306 TTE W/DOPPLER COMPLETE: CPT | Performed by: STUDENT IN AN ORGANIZED HEALTH CARE EDUCATION/TRAINING PROGRAM

## 2022-01-12 RX ORDER — SODIUM CHLORIDE 9 MG/ML
100 INJECTION, SOLUTION INTRAVENOUS CONTINUOUS
Status: CANCELLED | OUTPATIENT
Start: 2022-01-13

## 2022-01-12 RX ORDER — SODIUM CHLORIDE 0.9 % (FLUSH) 0.9 %
5-40 SYRINGE (ML) INJECTION EVERY 8 HOURS
Status: CANCELLED | OUTPATIENT
Start: 2022-01-13

## 2022-01-12 RX ORDER — SODIUM CHLORIDE 0.9 % (FLUSH) 0.9 %
5-40 SYRINGE (ML) INJECTION AS NEEDED
Status: CANCELLED | OUTPATIENT
Start: 2022-01-13

## 2022-01-12 RX ADMIN — PERFLUTREN 1.3 ML: 6.52 INJECTION, SUSPENSION INTRAVENOUS at 08:43

## 2022-01-13 ENCOUNTER — HOSPITAL ENCOUNTER (OUTPATIENT)
Age: 72
Setting detail: OUTPATIENT SURGERY
Discharge: HOME | End: 2022-01-13
Attending: STUDENT IN AN ORGANIZED HEALTH CARE EDUCATION/TRAINING PROGRAM | Admitting: STUDENT IN AN ORGANIZED HEALTH CARE EDUCATION/TRAINING PROGRAM
Payer: MEDICARE

## 2022-01-13 VITALS
DIASTOLIC BLOOD PRESSURE: 65 MMHG | WEIGHT: 315 LBS | HEIGHT: 78 IN | BODY MASS INDEX: 36.45 KG/M2 | SYSTOLIC BLOOD PRESSURE: 99 MMHG | RESPIRATION RATE: 11 BRPM | TEMPERATURE: 97.8 F | HEART RATE: 66 BPM | OXYGEN SATURATION: 96 %

## 2022-01-13 DIAGNOSIS — R94.39 ABNORMAL STRESS TEST: ICD-10-CM

## 2022-01-13 PROCEDURE — 77030029065 HC DRSG HEMO QCLOT ZMED -B

## 2022-01-13 PROCEDURE — 93458 L HRT ARTERY/VENTRICLE ANGIO: CPT | Performed by: STUDENT IN AN ORGANIZED HEALTH CARE EDUCATION/TRAINING PROGRAM

## 2022-01-13 PROCEDURE — 74011250636 HC RX REV CODE- 250/636: Performed by: STUDENT IN AN ORGANIZED HEALTH CARE EDUCATION/TRAINING PROGRAM

## 2022-01-13 PROCEDURE — 74011000250 HC RX REV CODE- 250: Performed by: STUDENT IN AN ORGANIZED HEALTH CARE EDUCATION/TRAINING PROGRAM

## 2022-01-13 PROCEDURE — C1887 CATHETER, GUIDING: HCPCS | Performed by: STUDENT IN AN ORGANIZED HEALTH CARE EDUCATION/TRAINING PROGRAM

## 2022-01-13 PROCEDURE — 77030004532 HC CATH ANGI DX IMP BSC -A: Performed by: STUDENT IN AN ORGANIZED HEALTH CARE EDUCATION/TRAINING PROGRAM

## 2022-01-13 PROCEDURE — C1894 INTRO/SHEATH, NON-LASER: HCPCS | Performed by: STUDENT IN AN ORGANIZED HEALTH CARE EDUCATION/TRAINING PROGRAM

## 2022-01-13 PROCEDURE — 74011000636 HC RX REV CODE- 636: Performed by: STUDENT IN AN ORGANIZED HEALTH CARE EDUCATION/TRAINING PROGRAM

## 2022-01-13 PROCEDURE — C1769 GUIDE WIRE: HCPCS | Performed by: STUDENT IN AN ORGANIZED HEALTH CARE EDUCATION/TRAINING PROGRAM

## 2022-01-13 PROCEDURE — 99152 MOD SED SAME PHYS/QHP 5/>YRS: CPT | Performed by: STUDENT IN AN ORGANIZED HEALTH CARE EDUCATION/TRAINING PROGRAM

## 2022-01-13 PROCEDURE — 77030015766: Performed by: STUDENT IN AN ORGANIZED HEALTH CARE EDUCATION/TRAINING PROGRAM

## 2022-01-13 PROCEDURE — 77030008543 HC TBNG MON PRSS MRTM -A: Performed by: STUDENT IN AN ORGANIZED HEALTH CARE EDUCATION/TRAINING PROGRAM

## 2022-01-13 PROCEDURE — 77030019569 HC BND COMPR RAD TERU -B: Performed by: STUDENT IN AN ORGANIZED HEALTH CARE EDUCATION/TRAINING PROGRAM

## 2022-01-13 PROCEDURE — 2709999900 HC NON-CHARGEABLE SUPPLY: Performed by: STUDENT IN AN ORGANIZED HEALTH CARE EDUCATION/TRAINING PROGRAM

## 2022-01-13 RX ORDER — SODIUM CHLORIDE 0.9 % (FLUSH) 0.9 %
5-40 SYRINGE (ML) INJECTION AS NEEDED
Status: DISCONTINUED | OUTPATIENT
Start: 2022-01-13 | End: 2022-01-13 | Stop reason: HOSPADM

## 2022-01-13 RX ORDER — MIDAZOLAM HYDROCHLORIDE 1 MG/ML
INJECTION, SOLUTION INTRAMUSCULAR; INTRAVENOUS AS NEEDED
Status: DISCONTINUED | OUTPATIENT
Start: 2022-01-13 | End: 2022-01-13 | Stop reason: HOSPADM

## 2022-01-13 RX ORDER — SODIUM CHLORIDE 9 MG/ML
100 INJECTION, SOLUTION INTRAVENOUS CONTINUOUS
Status: DISCONTINUED | OUTPATIENT
Start: 2022-01-13 | End: 2022-01-13 | Stop reason: HOSPADM

## 2022-01-13 RX ORDER — SODIUM CHLORIDE 0.9 % (FLUSH) 0.9 %
5-40 SYRINGE (ML) INJECTION EVERY 8 HOURS
Status: DISCONTINUED | OUTPATIENT
Start: 2022-01-13 | End: 2022-01-13 | Stop reason: HOSPADM

## 2022-01-13 RX ORDER — FENTANYL CITRATE 50 UG/ML
INJECTION, SOLUTION INTRAMUSCULAR; INTRAVENOUS AS NEEDED
Status: DISCONTINUED | OUTPATIENT
Start: 2022-01-13 | End: 2022-01-13 | Stop reason: HOSPADM

## 2022-01-13 RX ORDER — VERAPAMIL HYDROCHLORIDE 2.5 MG/ML
INJECTION, SOLUTION INTRAVENOUS AS NEEDED
Status: DISCONTINUED | OUTPATIENT
Start: 2022-01-13 | End: 2022-01-13 | Stop reason: HOSPADM

## 2022-01-13 RX ORDER — HEPARIN SODIUM 200 [USP'U]/100ML
INJECTION, SOLUTION INTRAVENOUS
Status: COMPLETED | OUTPATIENT
Start: 2022-01-13 | End: 2022-01-13

## 2022-01-13 RX ORDER — HEPARIN SODIUM 1000 [USP'U]/ML
INJECTION, SOLUTION INTRAVENOUS; SUBCUTANEOUS AS NEEDED
Status: DISCONTINUED | OUTPATIENT
Start: 2022-01-13 | End: 2022-01-13 | Stop reason: HOSPADM

## 2022-01-13 RX ORDER — LIDOCAINE HYDROCHLORIDE 10 MG/ML
INJECTION INFILTRATION; PERINEURAL AS NEEDED
Status: DISCONTINUED | OUTPATIENT
Start: 2022-01-13 | End: 2022-01-13 | Stop reason: HOSPADM

## 2022-01-13 NOTE — Clinical Note
LOP monitored by karyn RN.   [facility] [address] [facility]

## 2022-01-13 NOTE — DISCHARGE INSTRUCTIONS
Cardiac Catheterization/Angiography Discharge Instructions    *Check the puncture site frequently for swelling or bleeding. If you see any bleeding, lie down and apply pressure over the area and call 911. Notify your doctor for any redness, swelling, drainage or oozing from the puncture site. Notify your doctor for any fever or chills. *If the arm with the puncture becomes cold, numb or painful, call your cardiologist.     *Activity should be limited for the next 48 hours. Climb stairs as little as possible and avoid any stooping, bending or strenuous activity for 48 hours. No heavy lifting (anything over 10 pounds) for seven days. *Do not drive for 24 hours. Do not drink alcohol for 24 hours. *You may resume your usual diet. Drink more fluids than usual.    *Have a responsible person drive you home and stay with you for at least 24 hours after your heart catheterization/angiography. *You may remove the bandage from your wrist in 24 hours. You may shower in 24 hours. No tub baths, hot tubs or swimming for one week. Do not place any lotions, creams, powders, ointments over the puncture site for one week. You may place a clean band-aid over the puncture site each day for 5 days. Change this daily.

## 2022-01-13 NOTE — INTERVAL H&P NOTE
Update History & Physical    History and physical has been reviewed. The patient has been examined. There have been no significant clinical changes since the completion of the originally dated History and Physical.    Risk and benefit of cardiac catheterization/PCI was described in detail to patient.  (risk of vascular access complication with potential surgical intervention for management, stroke, myocardial infarction, emergent open heart surgery and death). Patient wishes to proceed with coronary angiography with possible intervention.          ASA 3  Airway 3    Electronically signed by Waqar Jimenez DO on 1/13/2022 at 8:01 AM

## 2022-01-13 NOTE — ROUTINE PROCESS
7:09 AM  Patient arrived. ID and allergies verified verbally with patient. Pt voices understanding of procedure to be performed. Consent obtained. Pt prepped for procedure. 8:08 AM  TRANSFER - OUT REPORT:    Verbal report given to Ed RN(name) on [facility].  being transferred to cath lab(unit) for ordered procedure       Report consisted of patients Situation, Background, Assessment and   Recommendations(SBAR). Information from the following report(s) SBAR was reviewed with the receiving nurse. Lines:   Peripheral IV 01/13/22 Anterior;Distal;Left Forearm (Active)   Site Assessment Clean, dry, & intact 01/13/22 0726   Phlebitis Assessment 0 01/13/22 0726   Infiltration Assessment 0 01/13/22 0726   Dressing Status Clean, dry, & intact 01/13/22 0726   Dressing Type Transparent 01/13/22 0726   Hub Color/Line Status Pink 01/13/22 0726        Opportunity for questions and clarification was provided. Patient transported with:   Registered Nurse    8:50 AM  TRANSFER - IN REPORT:    Verbal report received from Nicole Bustos RN(name) on [facility].  being received from cath lab(unit) for routine post - op      Report consisted of patients Situation, Background, Assessment and   Recommendations(SBAR). Information from the following report(s) SBAR was reviewed with the receiving nurse. Opportunity for questions and clarification was provided. Assessment completed upon patients arrival to unit and care assumed. 9:46 AM  2 ml air released from TR Band. No bleeding or hematoma noted. Radial and Ulnar pulse on right wrist palpable. Pt tolerated well. Will continue to monitor. 9:51 AM  2 ml air released from TR Band. No bleeding or hematoma noted. Radial and Ulnar pulse on right wrist palpable. Pt tolerated well. Will continue to monitor. 9:56 AM  3 ml air released from TR Band. No bleeding or hematoma noted. Radial and Ulnar pulse on right wrist palpable. Pt tolerated well.  Will continue to monitor. 10:01 AM  4 ml air released from TR Band. No bleeding or hematoma noted. Radial and Ulnar pulse on right wrist palpable. Pt tolerated well. Will continue to monitor. 10:06 AM  3 ml air released from TR Band. No bleeding or hematoma noted. Radial and Ulnar pulse on right wrist palpable. Pt tolerated well. Will continue to monitor. 10:07 AM  Air release completed. TR Band removed from right wrist. No bleeding or  Hematoma. Dressing applied. Wrist immobilizer in place. Radial and ulnar pulse remain palpable on affected extremity. Pt tolerated well. Instructions given to pt regarding movement and activity restrictions. Pt voiced understanding. 10:35 AM  Discharge instructions reviewed with patient and family. Voiced understanding. Patient given copy of discharge instructions to take home. 10:55 AM  Patient ambulates in hallway or on unit. 11:00 AM  Pt discharged via wheelchair with sister. Personal belongings with patient upon discharge.

## 2022-01-13 NOTE — PROCEDURES
BRIEF PROCEDURE NOTE    Date of Procedure: 1/13/2022   Preoperative Diagnosis:abnormal stress test  Postoperative Diagnosis: abnormal stress test    Procedure: Left heart cath, coronary angiography  Interventional Cardiologist: Andre Aleman DO  Assistant: None  Anesthesia: local + IV moderate sedation   I administered moderate sedation throughout this procedure. An independent trained observer pushed medications at my direction, and monitored the patients level of consciousness and physiological status throughout. Estimated Blood Loss: Minimal    Access: right radial artery, 6F  Catheters:  Left coronary: JL 4, 5F  Right coronary: JR5, 5F    Findings:   L Main: large caliber, short vessel  LAD: large caliber, moderate high D1, moderate D2, small D3, angiographically w/o significant disease  LCx: large caliber, moderate OM1, moderate OM2, angiographically w/o significant disease  RCA: large caliber, dominant, moderate PDA small Pl brnach, angiographically w/o significant disease    LVEDP:  10 mmhg       Specimens Removed: None    Implants: n/a    Closure Device: radial TR band    See full cath note. Complications: none      Findings:  1. angiographically normal coronary arteries  2.  Normal lvedp          Andre Aleman DO        [address], DO  [facility] [address], [address]                                              Office [phone],[facility] [phone]

## 2022-01-17 NOTE — PROGRESS NOTES
Called patient ID verified X2 reviewed below results per Dr Bryan Cleverly    Please let pt know that his echo showed normal heart function. Patient verbalized understanding.

## 2022-02-01 ENCOUNTER — HOSPITAL ENCOUNTER (OUTPATIENT)
Dept: PREADMISSION TESTING | Age: 72
Discharge: HOME | End: 2022-02-01
Payer: MEDICARE

## 2022-02-01 VITALS
HEIGHT: 78 IN | HEART RATE: 92 BPM | BODY MASS INDEX: 36.45 KG/M2 | WEIGHT: 315 LBS | TEMPERATURE: 96.8 F | DIASTOLIC BLOOD PRESSURE: 85 MMHG | SYSTOLIC BLOOD PRESSURE: 139 MMHG | OXYGEN SATURATION: 96 % | RESPIRATION RATE: 16 BRPM

## 2022-02-01 LAB
ABO + RH BLD: NORMAL
ALBUMIN SERPL-MCNC: 3.3 G/DL (ref 3.5–5)
ALBUMIN/GLOB SERPL: 0.7 {RATIO} (ref 1.1–2.2)
ALP SERPL-CCNC: 48 U/L (ref 45–117)
ALT SERPL-CCNC: 34 U/L (ref 12–78)
ANION GAP SERPL CALC-SCNC: 4 MMOL/L (ref 5–15)
APPEARANCE UR: CLEAR
APTT PPP: 25.4 SEC (ref 22.1–31)
AST SERPL-CCNC: 26 U/L (ref 15–37)
BACTERIA URNS QL MICRO: NEGATIVE /HPF
BILIRUB SERPL-MCNC: 0.8 MG/DL (ref 0.2–1)
BILIRUB UR QL: NEGATIVE
BLOOD GROUP ANTIBODIES SERPL: NORMAL
BUN SERPL-MCNC: 17 MG/DL (ref 6–20)
BUN/CREAT SERPL: 20 (ref 12–20)
CALCIUM SERPL-MCNC: 9.1 MG/DL (ref 8.5–10.1)
CHLORIDE SERPL-SCNC: 106 MMOL/L (ref 97–108)
CO2 SERPL-SCNC: 29 MMOL/L (ref 21–32)
COLOR UR: ABNORMAL
COMMENT, HOLDF: NORMAL
COMMENT, HOLDF: NORMAL
CREAT SERPL-MCNC: 0.86 MG/DL (ref 0.7–1.3)
EPITH CASTS URNS QL MICRO: ABNORMAL /LPF
EST. AVERAGE GLUCOSE BLD GHB EST-MCNC: 126 MG/DL
GLOBULIN SER CALC-MCNC: 4.5 G/DL (ref 2–4)
GLUCOSE SERPL-MCNC: 72 MG/DL (ref 65–100)
GLUCOSE UR STRIP.AUTO-MCNC: NEGATIVE MG/DL
HBA1C MFR BLD: 6 % (ref 4–5.6)
HGB UR QL STRIP: ABNORMAL
HYALINE CASTS URNS QL MICRO: ABNORMAL /LPF (ref 0–5)
INR PPP: 1.1 (ref 0.9–1.1)
KETONES UR QL STRIP.AUTO: NEGATIVE MG/DL
LEUKOCYTE ESTERASE UR QL STRIP.AUTO: NEGATIVE
NITRITE UR QL STRIP.AUTO: NEGATIVE
PH UR STRIP: 7.5 [PH] (ref 5–8)
POTASSIUM SERPL-SCNC: 3.9 MMOL/L (ref 3.5–5.1)
PROT SERPL-MCNC: 7.8 G/DL (ref 6.4–8.2)
PROT UR STRIP-MCNC: NEGATIVE MG/DL
PROTHROMBIN TIME: 11 SEC (ref 9–11.1)
RBC #/AREA URNS HPF: ABNORMAL /HPF (ref 0–5)
SAMPLES BEING HELD,HOLD: NORMAL
SAMPLES BEING HELD,HOLD: NORMAL
SODIUM SERPL-SCNC: 139 MMOL/L (ref 136–145)
SP GR UR REFRACTOMETRY: 1.01 (ref 1–1.03)
SPECIMEN EXP DATE BLD: NORMAL
THERAPEUTIC RANGE,PTTT: NORMAL SECS (ref 58–77)
UA: UC IF INDICATED,UAUC: ABNORMAL
UROBILINOGEN UR QL STRIP.AUTO: 1 EU/DL (ref 0.2–1)
WBC URNS QL MICRO: ABNORMAL /HPF (ref 0–4)

## 2022-02-01 PROCEDURE — 80053 COMPREHEN METABOLIC PANEL: CPT

## 2022-02-01 PROCEDURE — 36415 COLL VENOUS BLD VENIPUNCTURE: CPT

## 2022-02-01 PROCEDURE — 81001 URINALYSIS AUTO W/SCOPE: CPT

## 2022-02-01 PROCEDURE — 83036 HEMOGLOBIN GLYCOSYLATED A1C: CPT

## 2022-02-01 PROCEDURE — 85730 THROMBOPLASTIN TIME PARTIAL: CPT

## 2022-02-01 PROCEDURE — 85610 PROTHROMBIN TIME: CPT

## 2022-02-01 PROCEDURE — 86900 BLOOD TYPING SEROLOGIC ABO: CPT

## 2022-02-01 RX ORDER — HYDROCODONE BITARTRATE AND ACETAMINOPHEN 7.5; 325 MG/1; MG/1
1 TABLET ORAL AS NEEDED
COMMUNITY
End: 2022-02-18

## 2022-02-01 RX ORDER — CETIRIZINE HCL 10 MG
10 TABLET ORAL AS NEEDED
COMMUNITY

## 2022-02-01 RX ORDER — ZOLPIDEM TARTRATE 10 MG/1
10 TABLET ORAL AS NEEDED
COMMUNITY
End: 2022-02-18

## 2022-02-01 NOTE — PERIOP NOTES
[address] [address] [facility]   PRE-ADMISSION TESTING    [phone]     Surgery Date:  2/15/2022 Tuesday      Is surgery arrival time given by surgeon? NO  If NO, [facility] staff will call you between 3 and 7pm the day before your surgery with your arrival time. (If your surgery is on a Monday, we will call you the Friday before.)    Call [phone] after 7pm Monday-Friday if you did not receive this call. INSTRUCTIONS BEFORE YOUR SURGERY   When You  Arrive Arrive at the 2nd [address] [facility] on the day of your surgery  Have your insurance card, photo ID, and any copayment (if needed)   Food   and   Drink NO food or drink after midnight the night before surgery    This means NO water, gum, mints, coffee, juice, etc.  No alcohol (beer, wine, liquor) 24 hours before and after surgery   Medications to   TAKE   Morning of Surgery MEDICATIONS TO TAKE THE MORNING OF SURGERY WITH A SIP OF WATER:    Hydrocodone-APAP if needed    Call your rheumatologist to see if you need to hold your Remicade infusion and Methotrexate injections prior to surgery. Medications  To  STOP      7 days before surgery  Non-Steroidal anti-inflammatory Drugs (NSAID's): for example, Ibuprofen (Advil, Motrin), Naproxen (Aleve)   Aspirin   Herbal supplements, vitamins, and fish oil   Other: Diclofenac  (Pain medications not listed above, including Tylenol may be taken)       Bathing Clothing  Jewelry  Valuables      If you shower the morning of surgery, please do not apply anything to your skin (lotions, powders, deodorant, or makeup, especially mascara)   Follow Chlorhexidine Care Fusion body wash instructions provided to you during PAT appointment. Begin 3 days prior to surgery.    Do not shave or trim anywhere 24 hours before surgery   Wear your hair loose or down; no pony-tails, buns, or metal hair clips   Wear loose, comfortable, clean clothes   Wear glasses instead of contacts  [address], valuables, and jewelry, including body piercings, at home   If you were given an [employer], bring it on day of surgery. Going Home - or Spending the Night  SAME-DAY SURGERY: You must have a responsible adult drive you home and stay with you 24 hours after surgery   ADMITS: If your doctor is keeping you in the hospital after surgery, leave personal belongings/luggage in your car until you have a hospital room number. Hospital discharge time is 12 noon  Drivers must be here before 12 noon unless you are told differently   Special Instructions It is now mandated that all surgical patients be tested for COVID-19 prior to surgery. Testing has to be exactly 4 days prior to surgery. Your COVID test date is Thursday, 2/10/2022 between 8:00 am and 11:00 am.       COVID testing will be performed curbside at the [facility]  [facility]. There will be signs leading you to the testing site. You will need to bring a photo ID with you to be swabbed. Patients are advised to self-quarantine at home after testing and prior to your surgery date. You will be notified if your results are positive. What to watch for:   Coronavirus (COVID-19) affects different people in different ways   It also appears with a wide range of symptoms from mild to severe   Signs usually appear 2-14 days after exposure     If you develop any of the following, notify your doctor immediately:  o Fever  o Chills, with or without a shiver  o Muscle pain  o Headache  o Sore throat  o Dry cough  o New loss of taste or smell  o Tiredness      If you develop any of the following, call 911:  o Shortness of breath  o Difficulty breathing  o Chest pain  o New confusion  o Blueness of fingers and/or lips       Follow all instructions so your surgery wont be cancelled. Please, be on time.                     If a situation occurs and you are delayed the day of surgery, call [phone]. If your physical condition changes (like a fever, cold, flu, etc.) call your surgeon. Home medication(s) reviewed and verified verbally and with list during PAT appointment. The patient was contacted in person. The patient verbalizes understanding of all instructions and does not need reinforcement.

## 2022-02-01 NOTE — H&P
History and Physical    Patient: Halima Granda MRN: [id number]  SSN: [id number]    YOB: 1950  Age: 80 y.o. Sex: male      CC: Low back pain    Subjective:      Halima Granda is a 80 y.o. male referred for pre-operative evaluation by Dr. Natali Curtis for surgery on 2/15/22. Darcyjudi Wolffas notes he has had chronic low back pain for many years but in the last 10 years it has gotten worse. He was diagnosed with RA as a teenager and suffers from multiple joints that hurt. He also has limited cervical spine rotation. He has to use a walking stick for support. He has pain in both legs along with numbness in his thighs. In the morning he has extreme stiffness and has to wait 30 minutes before he can walk anywhere, he notes this is new in the last 3 weeks. Denies falls. No pain with sitting. Left leg is worse in the morning. He is followed by Dr. Yvonne Westfall for his heart health. He had an abnormal stress test recently and underwent a cath which was normal. He has no cardiac stents. He takes an 81mg ASA. He does have chronic venous insufficiency with leg edema. He normally wears compression stockings but he does not have them on today. He notes he has not taken his BP medications today. Denies calf pain. Does have a remote history of DVT. The patient was evaluated in the surgeon's office and it was determined that the most appropriate plan of care is to proceed with surgical intervention. Patient's PCP Gurwinder Jones MD    Past Medical History:   Diagnosis Date    Abnormal stress test 12/2021    Cardiac cath done and normal 1/13/22    Ankylosing spondylitis ([facility] [address] 1977    Dr. Anne Park.  neck, lumbar. sees pain management    Benign essential HTN     BPH (benign prostatic hyperplasia)     Dr. Johny Trejo. PSA 0.3 4/15/21, 0.6 5/1/20    Chronic edema     compression stockings    Colon polyp     hx.  last colonoscopy 2019.      COVID-19 vaccine series completed     DJD (degenerative joint disease) of cervical spine     Dr. Grace Bullock (dyspnea on exertion)     History of basal cell cancer 2013    face- Dr. Sindhu Hall. MOHS    History of bilateral knee replacement     Dr. Danielle Mederos    History of DVT (deep vein thrombosis)     left leg.  Insomnia     takes ambien per Dr. Thang Grimm Kidney stone     Lumbar spinal stenosis     Dr Emily Conley, Dr. Mary Fairchild. MRI 2021, 2017. hx of injections, nerve ablation    Neuropathy     feet. due to spinal dz?  saw neurology. hx GTT    JUDY on CPAP     Dr. Soni Craig    Psoriasis     Rheumatoid arthritis of cervical spine (HCC)     Limited ROM    SOB (shortness of breath)     Dr. Lore Franklin abnormal stress. cath 2014 per pt     Past Surgical History:   Procedure Laterality Date    HX COLONOSCOPY  10/15/2019    Dr. De La [email or web address]. normal (report received). repeat 5 years    HX COLONOSCOPY      hx polyps    HX HEART CATHETERIZATION      Dr Melani Barry  2022    Dr. Yue Barrett Bilateral     HX LITHOTRIPSY      HX [address] PROCEDURES  ? facial BCC    HX SHOULDER ARTHROSCOPY Left     HX WRIST FRACTURE TX Right       Family History   Problem Relation Age of Onset    Heart Disease Father     OSTEOARTHRITIS Father     OSTEOARTHRITIS Sister      Social History     Tobacco Use    Smoking status: Former Smoker     Types: Cigarettes     Quit date:      Years since quittin.1    Smokeless tobacco: Never Used   Substance Use Topics    Alcohol use: Yes     Comment: Social     Drug use: Never      Prior to Admission medications    Medication Sig Start Date End Date Taking? Authorizing Provider   zolpidem (AMBIEN) 10 mg tablet Take 10 mg by mouth as needed for Sleep. Yes Provider, Historical   HYDROcodone-acetaminophen (NORCO) 7.5-325 mg per tablet Take 1 Tablet by mouth as needed for Pain. Yes Provider, Historical   cyanocobalamin, vitamin B-12, (VITAMIN B-12 PO) Take 2,500 mcg by mouth daily.    Yes Provider, Historical   cetirizine (ZyrTEC) 10 mg tablet Take 10 mg by mouth as needed for Allergies. Yes Provider, Historical   METHOTREXATE, PF, SC by SubCUTAneous route every seven (7) days. Yes Provider, Historical   aspirin delayed-release 81 mg tablet Take 1 Tablet by mouth daily. 12/20/21  Yes Devante Littlejohn MD   valsartan-hydroCHLOROthiazide (DIOVAN-HCT) 160-12.5 mg per tablet TAKE 1 TABLET BY MOUTH EVERY DAY 9/3/21  Yes Atilano Baumgarten, MD   furosemide (LASIX) 20 mg tablet TAKE 1 TABLET BY MOUTH DAILY AS NEEDED (FOR SWELLING (EDEMA). TAKE FOR 3 DAYS MAX). 8/31/21  Yes Devante Littlejohn MD   tamsulosin (FLOMAX) 0.4 mg capsule Take 0.4 mg by mouth daily. 7/30/19  Yes Provider, Historical   folic acid (FOLVITE) 1 mg tablet TAKE 1 TABLET BY MOUTH EVERY DAY 7/20/20  Yes Provider, Historical   augmented betamethasone dipropionate (DIPROLENE-AF) 0.05 % ointment Apply  to affected area as needed for Skin Irritation. Yes Provider, Historical   INFLIXIMAB (REMICADE IV) by IntraVENous route every six (6) weeks. Yes Provider, Historical   diclofenac EC (VOLTAREN) 75 mg EC tablet Take 75 mg by mouth two (2) times a day. Yes Provider, Historical        Allergies   Allergen Reactions    Suprax [Cefixime] Other (comments)     Pt states it paralyzed him       Review of Systems:  Constitutional: Negative for chills and fever  HENT: Negative for congestion and sore throat  Eyes: negative for blurred vision and double vision  Respiratory: Negative for cough and wheezing. KING  Mouth: Negative for loose, broken or chipped teeth. Cardiovascular: Negative for chest pain and palpitations. Leg edema  Gastrointestinal: Negative for abdominal pain, constipation, diarrhea and nausea  Genitourinary: Negative for dysuria and hematuria  Musculoskeletal: Low back pain, neck pain  Skin: Negative for rash, open wounds.    Neurological: Negative for dizziness, tremors and headaches  Psychiatric: Negative for anxiety    Objective: Vitals:    02/01/22 1424 02/01/22 1504   BP: (!) 168/95 139/85   Pulse: 92    Resp: 16    Temp: 96.8 °F (36 °C)    SpO2: 96%    Weight: (!) 180.3 kg (397 lb 6.4 oz)    Height: 6' 8\" (2.032 m)         Physical Exam:  General Appearance: Alert, Well Appearing and in no distress  Mental Status: Normal mood, behavior, speech and dress  Neck: Normal appearance externally  Ears: External canal no drainage  Nose: Normal external appearance and no drainage   Chest: Clear to auscultation, no wheezes, rales or rhonchi  Heart: Normal rate, regular rhythm, no murmurs, rubs, clicks or gallops  Skin: Normal color, no lesions externally  Abdomen: Not examined  Neuro: Not examined  Musculoskeletal: Gait antalgic, Limited ROM to cervical spine. Bilateral legs with non-pitting edema 2+, tightness to legs. Recent Results (from the past 168 hour(s))   HEMOGLOBIN A1C WITH EAG    Collection Time: 02/01/22  1:02 AM   Result Value Ref Range    Hemoglobin A1c 6.0 (H) 4.0 - 5.6 %    Est. average glucose 953 mg/dL   METABOLIC PANEL, COMPREHENSIVE    Collection Time: 02/01/22  2:02 PM   Result Value Ref Range    Sodium 139 136 - 145 mmol/L    Potassium 3.9 3.5 - 5.1 mmol/L    Chloride 106 97 - 108 mmol/L    CO2 29 21 - 32 mmol/L    Anion gap 4 (L) 5 - 15 mmol/L    Glucose 72 65 - 100 mg/dL    BUN 17 6 - 20 MG/DL    Creatinine 0.86 0.70 - 1.30 MG/DL    BUN/Creatinine ratio 20 12 - 20      GFR est AA >60 >60 ml/min/1.73m2    GFR est non-AA >60 >60 ml/min/1.73m2    Calcium 9.1 8.5 - 10.1 MG/DL    Bilirubin, total 0.8 0.2 - 1.0 MG/DL    ALT (SGPT) 34 12 - 78 U/L    AST (SGOT) 26 15 - 37 U/L    Alk.  phosphatase 48 45 - 117 U/L    Protein, total 7.8 6.4 - 8.2 g/dL    Albumin 3.3 (L) 3.5 - 5.0 g/dL    Globulin 4.5 (H) 2.0 - 4.0 g/dL    A-G Ratio 0.7 (L) 1.1 - 2.2     URINALYSIS W/ REFLEX CULTURE    Collection Time: 02/01/22  2:02 PM    Specimen: Urine   Result Value Ref Range    Color YELLOW/STRAW      Appearance CLEAR CLEAR      Specific gravity 1.011 1.003 - 1.030      pH (UA) 7.5 5.0 - 8.0      Protein Negative NEG mg/dL    Glucose Negative NEG mg/dL    Ketone Negative NEG mg/dL    Bilirubin Negative NEG      Blood SMALL (A) NEG      Urobilinogen 1.0 0.2 - 1.0 EU/dL    Nitrites Negative NEG      Leukocyte Esterase Negative NEG      WBC 0-4 0 - 4 /hpf    RBC 5-10 0 - 5 /hpf    Epithelial cells FEW FEW /lpf    Bacteria Negative NEG /hpf    UA:UC IF INDICATED CULTURE NOT INDICATED BY UA RESULT CNI      Hyaline cast 0-2 0 - 5 /lpf   SAMPLES BEING HELD    Collection Time: 02/01/22  2:02 PM   Result Value Ref Range    SAMPLES BEING HELD 1LAV     COMMENT        Add-on orders for these samples will be processed based on acceptable specimen integrity and analyte stability, which may vary by analyte. SAMPLES BEING HELD    Collection Time: 02/01/22  2:02 PM   Result Value Ref Range    SAMPLES BEING HELD 1PST     COMMENT        Add-on orders for these samples will be processed based on acceptable specimen integrity and analyte stability, which may vary by analyte. PROTHROMBIN TIME + INR    Collection Time: 02/01/22  2:03 PM   Result Value Ref Range    INR 1.1 0.9 - 1.1      Prothrombin time 11.0 9.0 - 11.1 sec   PTT    Collection Time: 02/01/22  2:03 PM   Result Value Ref Range    aPTT 25.4 22.1 - 31.0 sec    aPTT, therapeutic range     58.0 - 77.0 SECS   CULTURE, MRSA    Collection Time: 02/01/22  2:13 PM    Specimen: Nares; Nasal   Result Value Ref Range    Special Requests: NO SPECIAL REQUESTS      Culture result: MRSA NOT PRESENT      Culture result:        Screening of patient nares for MRSA is for surveillance purposes and, if positive, to facilitate isolation considerations in high risk settings. It is not intended for automatic decolonization interventions per se as regimens are not sufficiently effective to warrant routine use.    TYPE & SCREEN    Collection Time: 02/01/22  2:13 PM   Result Value Ref Range    Crossmatch Expiration 02/15/2022,9947 ABO/Rh(D) A POSITIVE     Antibody screen NEG          Assessment:     Lumbar Stenosis  Pre-Operative Evaluation    Plan:     Lumbar Laminectomy  Labs and EKG reviewed. MRSA negative  Vitamin D unable to be run r/t tube shortage.        Signed By: Marlo Vivar NP     February 3, 2022

## 2022-02-02 LAB
BACTERIA SPEC CULT: NORMAL
BACTERIA SPEC CULT: NORMAL
SERVICE CMNT-IMP: NORMAL

## 2022-02-10 ENCOUNTER — HOSPITAL ENCOUNTER (OUTPATIENT)
Dept: PREADMISSION TESTING | Age: 72
Discharge: HOME | End: 2022-02-10
Payer: MEDICARE

## 2022-02-10 LAB
SARS-COV-2, XPLCVT: NOT DETECTED
SOURCE, COVRS: NORMAL

## 2022-02-10 PROCEDURE — 36415 COLL VENOUS BLD VENIPUNCTURE: CPT

## 2022-02-10 PROCEDURE — U0005 INFEC AGEN DETEC AMPLI PROBE: HCPCS

## 2022-02-14 ENCOUNTER — ANESTHESIA EVENT (OUTPATIENT)
Dept: SURGERY | Age: 72
End: 2022-02-14
Payer: MEDICARE

## 2022-02-14 RX ORDER — FLUMAZENIL 0.1 MG/ML
0.2 INJECTION INTRAVENOUS
Status: CANCELLED | OUTPATIENT
Start: 2022-02-14

## 2022-02-14 RX ORDER — NALOXONE HYDROCHLORIDE 0.4 MG/ML
0.2 INJECTION, SOLUTION INTRAMUSCULAR; INTRAVENOUS; SUBCUTANEOUS
Status: CANCELLED | OUTPATIENT
Start: 2022-02-14

## 2022-02-14 RX ORDER — SODIUM CHLORIDE 0.9 % (FLUSH) 0.9 %
5-40 SYRINGE (ML) INJECTION AS NEEDED
Status: CANCELLED | OUTPATIENT
Start: 2022-02-14

## 2022-02-14 RX ORDER — SODIUM CHLORIDE, SODIUM LACTATE, POTASSIUM CHLORIDE, CALCIUM CHLORIDE 600; 310; 30; 20 MG/100ML; MG/100ML; MG/100ML; MG/100ML
125 INJECTION, SOLUTION INTRAVENOUS CONTINUOUS
Status: CANCELLED | OUTPATIENT
Start: 2022-02-14 | End: 2022-02-15

## 2022-02-14 RX ORDER — LIDOCAINE HYDROCHLORIDE 10 MG/ML
0.1 INJECTION, SOLUTION EPIDURAL; INFILTRATION; INTRACAUDAL; PERINEURAL AS NEEDED
Status: CANCELLED | OUTPATIENT
Start: 2022-02-14

## 2022-02-14 RX ORDER — SODIUM CHLORIDE, SODIUM LACTATE, POTASSIUM CHLORIDE, CALCIUM CHLORIDE 600; 310; 30; 20 MG/100ML; MG/100ML; MG/100ML; MG/100ML
100 INJECTION, SOLUTION INTRAVENOUS CONTINUOUS
Status: CANCELLED | OUTPATIENT
Start: 2022-02-14 | End: 2022-02-15

## 2022-02-14 RX ORDER — SODIUM CHLORIDE 0.9 % (FLUSH) 0.9 %
5-40 SYRINGE (ML) INJECTION EVERY 8 HOURS
Status: CANCELLED | OUTPATIENT
Start: 2022-02-14

## 2022-02-14 NOTE — PROGRESS NOTES
The patient was provided a virtual link to view the pre-operative Spine Class. A pre-operative Patient education booklet specific to spine surgery was given to the patient in PAT. The content of the class was presented using an audio power point presentation specific for patients undergoing spine surgery. Incentive spirometer and CHG bath kits were verbally reviewed. Day of surgery routine and expectations, hospital routine and expectations, nutrition, alcohol, nicotine, medications, infection control, pain management, DVT precautions and equipment, ice therapy, durable medical equipment, exercises, mobility expectations and precautions, home preparation and safety were reviewed in class. My contact information was shared with the patient to provide further information as requested by the patient related to their upcoming surgery. Patient called to request hyperlink for online spine class, reviewed book information and answered questions.

## 2022-02-15 ENCOUNTER — HOSPITAL ENCOUNTER (OUTPATIENT)
Age: 72
Setting detail: OBSERVATION
Discharge: HOME | End: 2022-02-18
Attending: ORTHOPAEDIC SURGERY | Admitting: ORTHOPAEDIC SURGERY
Payer: MEDICARE

## 2022-02-15 ENCOUNTER — ANESTHESIA (OUTPATIENT)
Dept: SURGERY | Age: 72
End: 2022-02-15
Payer: MEDICARE

## 2022-02-15 ENCOUNTER — APPOINTMENT (OUTPATIENT)
Dept: GENERAL RADIOLOGY | Age: 72
End: 2022-02-15
Attending: ORTHOPAEDIC SURGERY
Payer: MEDICARE

## 2022-02-15 DIAGNOSIS — G89.18 ACUTE POST-OPERATIVE PAIN: Primary | ICD-10-CM

## 2022-02-15 PROBLEM — M48.062 LUMBAR STENOSIS WITH NEUROGENIC CLAUDICATION: Status: ACTIVE | Noted: 2022-02-15

## 2022-02-15 PROCEDURE — 76010000174 HC OR TIME 3.5 TO 4 HR INTENSV-TIER 1: Performed by: ORTHOPAEDIC SURGERY

## 2022-02-15 PROCEDURE — 77030014650 HC SEAL MTRX FLOSEL BAXT -C: Performed by: ORTHOPAEDIC SURGERY

## 2022-02-15 PROCEDURE — 77030019908 HC STETH ESOPH SIMS -A: Performed by: ANESTHESIOLOGY

## 2022-02-15 PROCEDURE — 74011000250 HC RX REV CODE- 250: Performed by: NURSE ANESTHETIST, CERTIFIED REGISTERED

## 2022-02-15 PROCEDURE — 77030027851: Performed by: ORTHOPAEDIC SURGERY

## 2022-02-15 PROCEDURE — 77030013079 HC BLNKT BAIR HGGR 3M -A: Performed by: ANESTHESIOLOGY

## 2022-02-15 PROCEDURE — C1713 ANCHOR/SCREW BN/BN,TIS/BN: HCPCS | Performed by: ORTHOPAEDIC SURGERY

## 2022-02-15 PROCEDURE — 74011000258 HC RX REV CODE- 258: Performed by: NURSE ANESTHETIST, CERTIFIED REGISTERED

## 2022-02-15 PROCEDURE — 77030013140 HC MSK NEB VYRM -A

## 2022-02-15 PROCEDURE — 74011250636 HC RX REV CODE- 250/636: Performed by: ANESTHESIOLOGY

## 2022-02-15 PROCEDURE — 77030031139 HC SUT VCRL2 J&J -A: Performed by: ORTHOPAEDIC SURGERY

## 2022-02-15 PROCEDURE — 74011000258 HC RX REV CODE- 258: Performed by: ORTHOPAEDIC SURGERY

## 2022-02-15 PROCEDURE — 77030004391 HC BUR FLUT MEDT -C: Performed by: ORTHOPAEDIC SURGERY

## 2022-02-15 PROCEDURE — 94761 N-INVAS EAR/PLS OXIMETRY MLT: CPT

## 2022-02-15 PROCEDURE — 74011250636 HC RX REV CODE- 250/636: Performed by: NURSE ANESTHETIST, CERTIFIED REGISTERED

## 2022-02-15 PROCEDURE — 77030040922 HC BLNKT HYPOTHRM STRY -A

## 2022-02-15 PROCEDURE — 77030008684 HC TU ET CUF COVD -B: Performed by: ANESTHESIOLOGY

## 2022-02-15 PROCEDURE — 74011000250 HC RX REV CODE- 250: Performed by: STUDENT IN AN ORGANIZED HEALTH CARE EDUCATION/TRAINING PROGRAM

## 2022-02-15 PROCEDURE — 77030035236 HC SUT PDS STRATFX BARB J&J -B: Performed by: ORTHOPAEDIC SURGERY

## 2022-02-15 PROCEDURE — 74011000250 HC RX REV CODE- 250: Performed by: ORTHOPAEDIC SURGERY

## 2022-02-15 PROCEDURE — 74011250636 HC RX REV CODE- 250/636: Performed by: ORTHOPAEDIC SURGERY

## 2022-02-15 PROCEDURE — 74011250637 HC RX REV CODE- 250/637: Performed by: ORTHOPAEDIC SURGERY

## 2022-02-15 PROCEDURE — 77030037134 HC WRAP COMPR BACK THER SOLM -B

## 2022-02-15 PROCEDURE — 77010033678 HC OXYGEN DAILY

## 2022-02-15 PROCEDURE — 77030040361 HC SLV COMPR DVT MDII -B

## 2022-02-15 PROCEDURE — 77030038692 HC WND DEB SYS IRMX -B: Performed by: ORTHOPAEDIC SURGERY

## 2022-02-15 PROCEDURE — 76060000038 HC ANESTHESIA 3.5 TO 4 HR: Performed by: ORTHOPAEDIC SURGERY

## 2022-02-15 PROCEDURE — 76210000017 HC OR PH I REC 1.5 TO 2 HR: Performed by: ORTHOPAEDIC SURGERY

## 2022-02-15 PROCEDURE — C9290 INJ, BUPIVACAINE LIPOSOME: HCPCS | Performed by: ORTHOPAEDIC SURGERY

## 2022-02-15 PROCEDURE — 77030041279 HC DRSG PRMSL AG MDII -B: Performed by: ORTHOPAEDIC SURGERY

## 2022-02-15 PROCEDURE — 2709999900 HC NON-CHARGEABLE SUPPLY: Performed by: ORTHOPAEDIC SURGERY

## 2022-02-15 DEVICE — DURAGEN® SUTURABLE DURAL REGENERATION MATRIX, 2 IN X 2 IN (5 CM X 5 CM)
Type: IMPLANTABLE DEVICE | Site: SPINE LUMBAR | Status: FUNCTIONAL
Brand: DURAGEN® SUTURABLE

## 2022-02-15 RX ORDER — HYDROMORPHONE HYDROCHLORIDE 1 MG/ML
.5-1 INJECTION, SOLUTION INTRAMUSCULAR; INTRAVENOUS; SUBCUTANEOUS
Status: DISCONTINUED | OUTPATIENT
Start: 2022-02-15 | End: 2022-02-15 | Stop reason: HOSPADM

## 2022-02-15 RX ORDER — MIDAZOLAM HYDROCHLORIDE 1 MG/ML
INJECTION, SOLUTION INTRAMUSCULAR; INTRAVENOUS AS NEEDED
Status: DISCONTINUED | OUTPATIENT
Start: 2022-02-15 | End: 2022-02-15 | Stop reason: HOSPADM

## 2022-02-15 RX ORDER — SODIUM CHLORIDE, SODIUM LACTATE, POTASSIUM CHLORIDE, CALCIUM CHLORIDE 600; 310; 30; 20 MG/100ML; MG/100ML; MG/100ML; MG/100ML
125 INJECTION, SOLUTION INTRAVENOUS CONTINUOUS
Status: DISCONTINUED | OUTPATIENT
Start: 2022-02-15 | End: 2022-02-15 | Stop reason: HOSPADM

## 2022-02-15 RX ORDER — OXYCODONE HYDROCHLORIDE 5 MG/1
5 TABLET ORAL
Status: DISCONTINUED | OUTPATIENT
Start: 2022-02-15 | End: 2022-02-18 | Stop reason: HOSPADM

## 2022-02-15 RX ORDER — FUROSEMIDE 20 MG/1
20 TABLET ORAL
Status: DISCONTINUED | OUTPATIENT
Start: 2022-02-15 | End: 2022-02-18 | Stop reason: HOSPADM

## 2022-02-15 RX ORDER — AMOXICILLIN 250 MG
1 CAPSULE ORAL 2 TIMES DAILY
Status: DISCONTINUED | OUTPATIENT
Start: 2022-02-15 | End: 2022-02-18 | Stop reason: HOSPADM

## 2022-02-15 RX ORDER — HYDROMORPHONE HYDROCHLORIDE 1 MG/ML
0.5 INJECTION, SOLUTION INTRAMUSCULAR; INTRAVENOUS; SUBCUTANEOUS
Status: ACTIVE | OUTPATIENT
Start: 2022-02-15 | End: 2022-02-16

## 2022-02-15 RX ORDER — ROCURONIUM BROMIDE 10 MG/ML
INJECTION, SOLUTION INTRAVENOUS AS NEEDED
Status: DISCONTINUED | OUTPATIENT
Start: 2022-02-15 | End: 2022-02-15 | Stop reason: HOSPADM

## 2022-02-15 RX ORDER — ONDANSETRON 2 MG/ML
4 INJECTION INTRAMUSCULAR; INTRAVENOUS AS NEEDED
Status: DISCONTINUED | OUTPATIENT
Start: 2022-02-15 | End: 2022-02-15 | Stop reason: HOSPADM

## 2022-02-15 RX ORDER — SODIUM CHLORIDE 0.9 % (FLUSH) 0.9 %
5-40 SYRINGE (ML) INJECTION EVERY 8 HOURS
Status: DISCONTINUED | OUTPATIENT
Start: 2022-02-15 | End: 2022-02-18 | Stop reason: HOSPADM

## 2022-02-15 RX ORDER — NALOXONE HYDROCHLORIDE 0.4 MG/ML
0.4 INJECTION, SOLUTION INTRAMUSCULAR; INTRAVENOUS; SUBCUTANEOUS AS NEEDED
Status: DISCONTINUED | OUTPATIENT
Start: 2022-02-15 | End: 2022-02-18 | Stop reason: HOSPADM

## 2022-02-15 RX ORDER — OXYCODONE HYDROCHLORIDE 5 MG/1
10 TABLET ORAL
Status: DISCONTINUED | OUTPATIENT
Start: 2022-02-15 | End: 2022-02-18 | Stop reason: HOSPADM

## 2022-02-15 RX ORDER — DIPHENHYDRAMINE HYDROCHLORIDE 50 MG/ML
12.5 INJECTION, SOLUTION INTRAMUSCULAR; INTRAVENOUS
Status: DISCONTINUED | OUTPATIENT
Start: 2022-02-15 | End: 2022-02-18 | Stop reason: HOSPADM

## 2022-02-15 RX ORDER — POLYETHYLENE GLYCOL 3350 17 G/17G
17 POWDER, FOR SOLUTION ORAL DAILY
Status: DISCONTINUED | OUTPATIENT
Start: 2022-02-16 | End: 2022-02-18 | Stop reason: HOSPADM

## 2022-02-15 RX ORDER — VALSARTAN 80 MG/1
160 TABLET ORAL DAILY
Status: DISCONTINUED | OUTPATIENT
Start: 2022-02-16 | End: 2022-02-18 | Stop reason: HOSPADM

## 2022-02-15 RX ORDER — PROPOFOL 10 MG/ML
INJECTION, EMULSION INTRAVENOUS AS NEEDED
Status: DISCONTINUED | OUTPATIENT
Start: 2022-02-15 | End: 2022-02-15 | Stop reason: HOSPADM

## 2022-02-15 RX ORDER — LIDOCAINE HYDROCHLORIDE 40 MG/ML
SOLUTION TOPICAL ONCE
Status: COMPLETED | OUTPATIENT
Start: 2022-02-15 | End: 2022-02-15

## 2022-02-15 RX ORDER — SODIUM CHLORIDE 9 MG/ML
125 INJECTION, SOLUTION INTRAVENOUS CONTINUOUS
Status: DISPENSED | OUTPATIENT
Start: 2022-02-15 | End: 2022-02-16

## 2022-02-15 RX ORDER — SODIUM CHLORIDE 0.9 % (FLUSH) 0.9 %
5-40 SYRINGE (ML) INJECTION AS NEEDED
Status: DISCONTINUED | OUTPATIENT
Start: 2022-02-15 | End: 2022-02-18 | Stop reason: HOSPADM

## 2022-02-15 RX ORDER — CYCLOBENZAPRINE HCL 10 MG
10 TABLET ORAL
Status: DISCONTINUED | OUTPATIENT
Start: 2022-02-15 | End: 2022-02-18 | Stop reason: HOSPADM

## 2022-02-15 RX ORDER — SODIUM CHLORIDE, SODIUM LACTATE, POTASSIUM CHLORIDE, CALCIUM CHLORIDE 600; 310; 30; 20 MG/100ML; MG/100ML; MG/100ML; MG/100ML
INJECTION, SOLUTION INTRAVENOUS
Status: DISCONTINUED | OUTPATIENT
Start: 2022-02-15 | End: 2022-02-15 | Stop reason: HOSPADM

## 2022-02-15 RX ORDER — VANCOMYCIN HYDROCHLORIDE 1 G/20ML
INJECTION, POWDER, LYOPHILIZED, FOR SOLUTION INTRAVENOUS AS NEEDED
Status: DISCONTINUED | OUTPATIENT
Start: 2022-02-15 | End: 2022-02-15 | Stop reason: HOSPADM

## 2022-02-15 RX ORDER — HYDROCHLOROTHIAZIDE 25 MG/1
12.5 TABLET ORAL DAILY
Status: DISCONTINUED | OUTPATIENT
Start: 2022-02-16 | End: 2022-02-18 | Stop reason: HOSPADM

## 2022-02-15 RX ORDER — ONDANSETRON 2 MG/ML
INJECTION INTRAMUSCULAR; INTRAVENOUS AS NEEDED
Status: DISCONTINUED | OUTPATIENT
Start: 2022-02-15 | End: 2022-02-15 | Stop reason: HOSPADM

## 2022-02-15 RX ORDER — ACETAMINOPHEN 325 MG/1
650 TABLET ORAL
Status: DISCONTINUED | OUTPATIENT
Start: 2022-02-15 | End: 2022-02-18 | Stop reason: HOSPADM

## 2022-02-15 RX ORDER — FENTANYL CITRATE 50 UG/ML
INJECTION, SOLUTION INTRAMUSCULAR; INTRAVENOUS AS NEEDED
Status: DISCONTINUED | OUTPATIENT
Start: 2022-02-15 | End: 2022-02-15 | Stop reason: HOSPADM

## 2022-02-15 RX ORDER — HYDROMORPHONE HYDROCHLORIDE 2 MG/ML
INJECTION, SOLUTION INTRAMUSCULAR; INTRAVENOUS; SUBCUTANEOUS AS NEEDED
Status: DISCONTINUED | OUTPATIENT
Start: 2022-02-15 | End: 2022-02-15 | Stop reason: HOSPADM

## 2022-02-15 RX ORDER — ONDANSETRON 2 MG/ML
4 INJECTION INTRAMUSCULAR; INTRAVENOUS
Status: ACTIVE | OUTPATIENT
Start: 2022-02-15 | End: 2022-02-16

## 2022-02-15 RX ORDER — FACIAL-BODY WIPES
10 EACH TOPICAL DAILY PRN
Status: DISCONTINUED | OUTPATIENT
Start: 2022-02-17 | End: 2022-02-18 | Stop reason: HOSPADM

## 2022-02-15 RX ORDER — FAMOTIDINE 20 MG/1
20 TABLET, FILM COATED ORAL 2 TIMES DAILY
Status: DISCONTINUED | OUTPATIENT
Start: 2022-02-15 | End: 2022-02-18 | Stop reason: HOSPADM

## 2022-02-15 RX ORDER — TAMSULOSIN HYDROCHLORIDE 0.4 MG/1
0.4 CAPSULE ORAL DAILY
Status: DISCONTINUED | OUTPATIENT
Start: 2022-02-16 | End: 2022-02-18 | Stop reason: HOSPADM

## 2022-02-15 RX ORDER — EPHEDRINE SULFATE/0.9% NACL/PF 50 MG/5 ML
SYRINGE (ML) INTRAVENOUS AS NEEDED
Status: DISCONTINUED | OUTPATIENT
Start: 2022-02-15 | End: 2022-02-15 | Stop reason: HOSPADM

## 2022-02-15 RX ADMIN — OXYCODONE 10 MG: 5 TABLET ORAL at 22:30

## 2022-02-15 RX ADMIN — HYDROMORPHONE HYDROCHLORIDE 0.5 MG: 2 INJECTION INTRAMUSCULAR; INTRAVENOUS; SUBCUTANEOUS at 15:55

## 2022-02-15 RX ADMIN — FENTANYL CITRATE 50 MCG: 50 INJECTION INTRAMUSCULAR; INTRAVENOUS at 16:55

## 2022-02-15 RX ADMIN — FENTANYL CITRATE 50 MCG: 50 INJECTION INTRAMUSCULAR; INTRAVENOUS at 17:17

## 2022-02-15 RX ADMIN — SODIUM CHLORIDE 125 ML/HR: 9 INJECTION, SOLUTION INTRAVENOUS at 17:37

## 2022-02-15 RX ADMIN — PHENYLEPHRINE HYDROCHLORIDE 120 MCG: 10 INJECTION INTRAVENOUS at 13:58

## 2022-02-15 RX ADMIN — HYDROMORPHONE HYDROCHLORIDE 1 MG: 2 INJECTION INTRAMUSCULAR; INTRAVENOUS; SUBCUTANEOUS at 17:10

## 2022-02-15 RX ADMIN — HYDROMORPHONE HYDROCHLORIDE 0.5 MG: 1 INJECTION, SOLUTION INTRAMUSCULAR; INTRAVENOUS; SUBCUTANEOUS at 18:09

## 2022-02-15 RX ADMIN — Medication: at 19:48

## 2022-02-15 RX ADMIN — SODIUM CHLORIDE 90 MCG: 9 INJECTION, SOLUTION INTRAVENOUS at 13:20

## 2022-02-15 RX ADMIN — MIDAZOLAM 2 MG: 1 INJECTION, SOLUTION INTRAMUSCULAR; INTRAVENOUS at 13:18

## 2022-02-15 RX ADMIN — Medication 10 ML: at 21:58

## 2022-02-15 RX ADMIN — PHENYLEPHRINE HYDROCHLORIDE 80 MCG: 10 INJECTION INTRAVENOUS at 15:20

## 2022-02-15 RX ADMIN — REMIFENTANIL HYDROCHLORIDE 0.06 MCG/KG/MIN: 1 INJECTION, POWDER, LYOPHILIZED, FOR SOLUTION INTRAVENOUS at 13:29

## 2022-02-15 RX ADMIN — CEFAZOLIN 0.3 G: 10 INJECTION, POWDER, FOR SOLUTION INTRAVENOUS at 12:05

## 2022-02-15 RX ADMIN — SODIUM CHLORIDE, POTASSIUM CHLORIDE, SODIUM LACTATE AND CALCIUM CHLORIDE: 600; 310; 30; 20 INJECTION, SOLUTION INTRAVENOUS at 13:15

## 2022-02-15 RX ADMIN — PROPOFOL 100 MG: 10 INJECTION, EMULSION INTRAVENOUS at 16:06

## 2022-02-15 RX ADMIN — CEFAZOLIN 1 G: 1 INJECTION, POWDER, FOR SOLUTION INTRAMUSCULAR; INTRAVENOUS at 21:58

## 2022-02-15 RX ADMIN — FAMOTIDINE 20 MG: 20 TABLET ORAL at 22:30

## 2022-02-15 RX ADMIN — PROPOFOL 100 MG: 10 INJECTION, EMULSION INTRAVENOUS at 16:07

## 2022-02-15 RX ADMIN — OXYCODONE 5 MG: 5 TABLET ORAL at 18:30

## 2022-02-15 RX ADMIN — LIDOCAINE HYDROCHLORIDE: 40 SOLUTION TOPICAL at 12:49

## 2022-02-15 RX ADMIN — PHENYLEPHRINE HYDROCHLORIDE 80 MCG: 10 INJECTION INTRAVENOUS at 15:29

## 2022-02-15 RX ADMIN — SODIUM CHLORIDE, POTASSIUM CHLORIDE, SODIUM LACTATE AND CALCIUM CHLORIDE: 600; 310; 30; 20 INJECTION, SOLUTION INTRAVENOUS at 14:50

## 2022-02-15 RX ADMIN — HYDROMORPHONE HYDROCHLORIDE 0.5 MG: 1 INJECTION, SOLUTION INTRAMUSCULAR; INTRAVENOUS; SUBCUTANEOUS at 17:36

## 2022-02-15 RX ADMIN — HYDROMORPHONE HYDROCHLORIDE 0.5 MG: 2 INJECTION INTRAMUSCULAR; INTRAVENOUS; SUBCUTANEOUS at 16:05

## 2022-02-15 RX ADMIN — SODIUM CHLORIDE, POTASSIUM CHLORIDE, SODIUM LACTATE AND CALCIUM CHLORIDE: 600; 310; 30; 20 INJECTION, SOLUTION INTRAVENOUS at 14:10

## 2022-02-15 RX ADMIN — Medication 15 MG: at 14:50

## 2022-02-15 RX ADMIN — PHENYLEPHRINE HYDROCHLORIDE 80 MCG: 10 INJECTION INTRAVENOUS at 14:50

## 2022-02-15 RX ADMIN — HYDROMORPHONE HYDROCHLORIDE 0.5 MG: 1 INJECTION, SOLUTION INTRAMUSCULAR; INTRAVENOUS; SUBCUTANEOUS at 18:26

## 2022-02-15 RX ADMIN — ROCURONIUM BROMIDE 50 MG: 10 INJECTION INTRAVENOUS at 13:41

## 2022-02-15 RX ADMIN — CEFAZOLIN 1 G: 1 INJECTION, POWDER, FOR SOLUTION INTRAMUSCULAR; INTRAVENOUS at 21:57

## 2022-02-15 RX ADMIN — PROPOFOL 50 MG: 10 INJECTION, EMULSION INTRAVENOUS at 16:55

## 2022-02-15 RX ADMIN — ONDANSETRON HYDROCHLORIDE 4 MG: 2 SOLUTION INTRAMUSCULAR; INTRAVENOUS at 16:23

## 2022-02-15 RX ADMIN — PROPOFOL 200 MG: 10 INJECTION, EMULSION INTRAVENOUS at 13:40

## 2022-02-15 RX ADMIN — FENTANYL CITRATE 50 MCG: 50 INJECTION INTRAMUSCULAR; INTRAVENOUS at 17:13

## 2022-02-15 RX ADMIN — Medication 15 MG: at 13:47

## 2022-02-15 RX ADMIN — HYDROMORPHONE HYDROCHLORIDE 0.5 MG: 1 INJECTION, SOLUTION INTRAMUSCULAR; INTRAVENOUS; SUBCUTANEOUS at 17:54

## 2022-02-15 NOTE — OP NOTES
[address], [address] [place] [address] [place]    OPERATIVE REPORT      NAME: Kathi Rick. AGE: 80 y.o. YOB: 1950    MEDICAL RECORD NUMBER: [id number]    DATE OF SURGERY: 2/15/2022    PREOPERATIVE DIAGNOSIS: Lumbar stenosis     POSTOPERATIVE DIAGNOSIS: Lumbar stenosis    OPERATIVE PROCEDURE: L2, L3, L4, and L5 laminectomy, partial facetectomy, and foraminotomy    SURGEON: Suzette Jones MD     ASSISTANT: NAOMI Leger    ANESTHESIA: General     ESTIMATED BLOOD LOSS: 300 cc    Specimens - no    Implants - no    COMPLICATIONS: None apparent    INDICATION: The patient is a very pleasant 80 y.o. male with debilitating back pain and neurogenic claudication. He failed conservative measures. He elected to proceed with operative intervention. He was aware of the risks, benefits, and alternatives. He provided informed consent. DESCRIPTION OF PROCEDURE: The patient was identified in the preoperative holding area. His lumbar spine was marked by me. He was transferred to the operating room where general anesthesia was given. He was also given perioperative ancef antibiotics. He was placed prone on the [facility] frame. All bony prominences were well padded. We prepped and draped the lumbar spine in standard fashion. We performed a surgical time out. I made a skin incision from L2 to L5. I exposed in standard fashion. I placed retractors. We obtained intraoperative fluoroscopy to verify our levels. I then performed a laminectomy of L2, L3, L4, and L5 with partial facetectomy and foraminotomy bilaterally. I decompressed the thecal sac and nerve roots of L2, L3, L4, and L5 bilaterally. We had good hemostasis. There was no CSF leaking. I placed Duragen over the exposed neurologic elements. The wound was copiously irrigated and closed in several layers. A sterile dressing was applied.  The patient was extubated and transferred to the recovery room in good medical condition. The PA assisted with closure and retraction. I, Dr. Edna Pa, performed the above procedures.      Edna Pa MD  2/15/2022

## 2022-02-15 NOTE — H&P
Date of Surgery Update:  Sonny Estes. was seen and examined. History and physical has been reviewed. The patient has been examined.  There have been no significant clinical changes since the completion of the originally dated History and Physical.    Signed By: Karson Gayle MD     February 15, 2022 11:05 AM

## 2022-02-15 NOTE — ANESTHESIA PREPROCEDURE EVALUATION
Relevant Problems   RESPIRATORY SYSTEM   (+) JUDY on CPAP      CARDIOVASCULAR   (+) Benign essential HTN      ENDOCRINE   (+) Ankylosing spondylitis (HCC)   (+) Obesity, morbid (HCC)       Anesthetic History          Comments: Anticipate difficult airway     Review of Systems / Medical History  Patient summary reviewed and pertinent labs reviewed    Pulmonary        Sleep apnea: CPAP           Neuro/Psych             Comments: Lumbar radiculopathy with diffiuclty ambulating Cardiovascular    Hypertension                Comments: Normal cardiac cath in Jan 2022   GI/Hepatic/Renal  Within defined limits              Endo/Other        Morbid obesity and arthritis    Comments: Ankylosing spondylitis Other Findings            Physical Exam    Airway  Mallampati: III  TM Distance: < 4 cm  Neck ROM: decreased range of motion   Mouth opening: Normal    Comments: Extremely limited neck extension due to ankylosing spondylitis Cardiovascular    Rhythm: regular  Rate: normal         Dental    Dentition: Lower dentition intact and Upper dentition intact     Pulmonary  Breath sounds clear to auscultation               Abdominal  GI exam deferred       Other Findings            Anesthetic Plan    ASA: 4  Anesthesia type: general  Awake fiberoptic         Induction: Intravenous  Anesthetic plan and risks discussed with: Patient      Discussed awake intubation with patient due to anticipated difficult airway. Plan for sedation, Precedex, topical lidocaine and then awake FOI. Pt is agreeable. Discussed with surgical PA.

## 2022-02-16 LAB
ANION GAP SERPL CALC-SCNC: 4 MMOL/L (ref 5–15)
BUN SERPL-MCNC: 16 MG/DL (ref 6–20)
BUN/CREAT SERPL: 17 (ref 12–20)
CALCIUM SERPL-MCNC: 8.3 MG/DL (ref 8.5–10.1)
CHLORIDE SERPL-SCNC: 107 MMOL/L (ref 97–108)
CO2 SERPL-SCNC: 30 MMOL/L (ref 21–32)
CREAT SERPL-MCNC: 0.93 MG/DL (ref 0.7–1.3)
GLUCOSE SERPL-MCNC: 147 MG/DL (ref 65–100)
HGB BLD-MCNC: 11.9 G/DL (ref 12.1–17)
POTASSIUM SERPL-SCNC: 4.3 MMOL/L (ref 3.5–5.1)
SODIUM SERPL-SCNC: 141 MMOL/L (ref 136–145)

## 2022-02-16 PROCEDURE — 94761 N-INVAS EAR/PLS OXIMETRY MLT: CPT

## 2022-02-16 PROCEDURE — 74011250637 HC RX REV CODE- 250/637: Performed by: ORTHOPAEDIC SURGERY

## 2022-02-16 PROCEDURE — 74011250636 HC RX REV CODE- 250/636: Performed by: ORTHOPAEDIC SURGERY

## 2022-02-16 PROCEDURE — 97530 THERAPEUTIC ACTIVITIES: CPT | Performed by: OCCUPATIONAL THERAPIST

## 2022-02-16 PROCEDURE — 97116 GAIT TRAINING THERAPY: CPT

## 2022-02-16 PROCEDURE — 74011000258 HC RX REV CODE- 258: Performed by: ORTHOPAEDIC SURGERY

## 2022-02-16 PROCEDURE — 97165 OT EVAL LOW COMPLEX 30 MIN: CPT | Performed by: OCCUPATIONAL THERAPIST

## 2022-02-16 PROCEDURE — 85018 HEMOGLOBIN: CPT

## 2022-02-16 PROCEDURE — 36415 COLL VENOUS BLD VENIPUNCTURE: CPT

## 2022-02-16 PROCEDURE — 77010033678 HC OXYGEN DAILY

## 2022-02-16 PROCEDURE — 97535 SELF CARE MNGMENT TRAINING: CPT | Performed by: OCCUPATIONAL THERAPIST

## 2022-02-16 PROCEDURE — 97530 THERAPEUTIC ACTIVITIES: CPT

## 2022-02-16 PROCEDURE — 97161 PT EVAL LOW COMPLEX 20 MIN: CPT

## 2022-02-16 PROCEDURE — 80048 BASIC METABOLIC PNL TOTAL CA: CPT

## 2022-02-16 PROCEDURE — 74011000250 HC RX REV CODE- 250: Performed by: ORTHOPAEDIC SURGERY

## 2022-02-16 PROCEDURE — 2709999900 HC NON-CHARGEABLE SUPPLY

## 2022-02-16 RX ADMIN — ACETAMINOPHEN 650 MG: 325 TABLET ORAL at 22:26

## 2022-02-16 RX ADMIN — FAMOTIDINE 20 MG: 20 TABLET ORAL at 10:27

## 2022-02-16 RX ADMIN — CEFAZOLIN 1 G: 1 INJECTION, POWDER, FOR SOLUTION INTRAMUSCULAR; INTRAVENOUS at 06:49

## 2022-02-16 RX ADMIN — CEFAZOLIN 1 G: 1 INJECTION, POWDER, FOR SOLUTION INTRAMUSCULAR; INTRAVENOUS at 11:55

## 2022-02-16 RX ADMIN — ACETAMINOPHEN 650 MG: 325 TABLET ORAL at 15:10

## 2022-02-16 RX ADMIN — CEFAZOLIN 1 G: 1 INJECTION, POWDER, FOR SOLUTION INTRAMUSCULAR; INTRAVENOUS at 06:56

## 2022-02-16 RX ADMIN — OXYCODONE 10 MG: 5 TABLET ORAL at 17:54

## 2022-02-16 RX ADMIN — OXYCODONE 10 MG: 5 TABLET ORAL at 15:05

## 2022-02-16 RX ADMIN — Medication 10 ML: at 21:09

## 2022-02-16 RX ADMIN — CEFAZOLIN 1 G: 1 INJECTION, POWDER, FOR SOLUTION INTRAMUSCULAR; INTRAVENOUS at 11:54

## 2022-02-16 RX ADMIN — DOCUSATE SODIUM 50MG AND SENNOSIDES 8.6MG 1 TABLET: 8.6; 5 TABLET, FILM COATED ORAL at 17:54

## 2022-02-16 RX ADMIN — Medication 10 ML: at 15:10

## 2022-02-16 RX ADMIN — Medication 10 ML: at 06:57

## 2022-02-16 RX ADMIN — DOCUSATE SODIUM 50MG AND SENNOSIDES 8.6MG 1 TABLET: 8.6; 5 TABLET, FILM COATED ORAL at 10:27

## 2022-02-16 RX ADMIN — OXYCODONE 10 MG: 5 TABLET ORAL at 12:01

## 2022-02-16 RX ADMIN — POLYETHYLENE GLYCOL 3350 17 G: 17 POWDER, FOR SOLUTION ORAL at 10:27

## 2022-02-16 RX ADMIN — TAMSULOSIN HYDROCHLORIDE 0.4 MG: 0.4 CAPSULE ORAL at 10:27

## 2022-02-16 RX ADMIN — OXYCODONE 10 MG: 5 TABLET ORAL at 21:08

## 2022-02-16 RX ADMIN — FAMOTIDINE 20 MG: 20 TABLET ORAL at 17:54

## 2022-02-16 NOTE — PERIOP NOTES
TRANSFER - OUT REPORT:    Verbal report given to Roselia(name) on [address]  being transferred to [facility](unit) for routine post - op       Report consisted of patients Situation, Background, Assessment and   Recommendations(SBAR). Information from the following report(s) SBAR and Cardiac Rhythm NSR was reviewed with the receiving nurse. Lines:   Peripheral IV 02/15/22 Left;Posterior Forearm (Active)   Site Assessment Clean, dry, & intact 02/15/22 1900   Phlebitis Assessment 0 02/15/22 1900   Infiltration Assessment 0 02/15/22 1900   Dressing Status Clean, dry, & intact 02/15/22 1900   Dressing Type Transparent 02/15/22 1900   Hub Color/Line Status Pink 02/15/22 1900       Peripheral IV 02/15/22 Right Hand (Active)   Site Assessment Clean, dry, & intact 02/15/22 1900   Phlebitis Assessment 0 02/15/22 1900   Infiltration Assessment 0 02/15/22 1900   Dressing Status Clean, dry, & intact 02/15/22 1900   Dressing Type Transparent 02/15/22 1900        Opportunity for questions and clarification was provided.       Patient transported with:   O2 @ 4 liters  Registered Nurse

## 2022-02-16 NOTE — PROGRESS NOTES
Patient arrived on the unit with postop nurse. Patient AOX3 on NC. Patient continues PCA mediation while on the floor. Patient is tall,obese and unable to log roll for the nurses to see skin to complete skin assessment.

## 2022-02-16 NOTE — PROGRESS NOTES
Spiritual Care Assessment/Progress Note  Eliud Preston      NAME: Satish Mariano. MRN: [id number]  AGE: 80 y.o. SEX: male  Adventism Affiliation: Roman Catholic   Language: English     2/16/2022     Total Time (in minutes): 20     Spiritual Assessment begun in [facility] POST SURG ORT 1 through conversation with:         [x]Patient        [] Family    [] Friend(s)        Reason for Consult: Initial/Spiritual assessment, patient floor     Spiritual beliefs: (Please include comment if needed)     [x] Identifies with a neris tradition: Roman Catholic-not currently active        [x] Supported by a neris community:  [id number] [place] [address]          [] Claims no spiritual orientation:           [] Seeking spiritual identity:                [] Adheres to an individual form of spirituality:           [] Not able to assess:                           Identified resources for coping:      [] Prayer                               [] Music                  [] Guided Imagery     [x] Family/friends                 [] Pet visits     [] Devotional reading                         [] Unknown     [] Other:                                              Interventions offered during this visit: (See comments for more details)    Patient Interventions: Affirmation of emotions/emotional suffering,Affirmation of neris,Catharsis/review of pertinent events in supportive environment,Iconic (affirming the presence of God/Higher Power),Normalization of emotional/spiritual concerns,Prayer (assurance of)           Plan of Care:     [] Support spiritual and/or cultural needs    [] Support AMD and/or advance care planning process      [] Support grieving process   [] Coordinate Rites and/or Rituals    [] Coordination with community clergy   [] No spiritual needs identified at this time   [] Detailed Plan of Care below (See Comments)  [] Make referral to Music Therapy  [] Make referral to Pet Therapy     [] Make referral to Addiction services  [] Make referral to [facility]  [] Make referral to Spiritual Care Partner  [] No future visits requested        [x] Follow up visits as needed     Visited patient for initial spiritual assessment. Patient was in some discomfort which shortened the visit. He lives alone but has a sister and friend who will support him upon discharge. A  from [facility] came by to visit with him earlier in the day.    Chaplain Mcfarland MDiv, MS, [address] [facility]

## 2022-02-16 NOTE — PROGRESS NOTES
Problem: Mobility Impaired (Adult and Pediatric)  Goal: *Acute Goals and Plan of Care (Insert Text)  Description: FUNCTIONAL STATUS PRIOR TO ADMISSION: Patient was modified independent using a walking stick for functional mobility. HOME SUPPORT PRIOR TO ADMISSION: The patient lived alone with no local support. Pt does not give full answers for his home support. 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 Physical Therapy Goals  Initiated 2/16/2022    1. Patient will move from supine to sit and sit to supine  in bed with independence within 4 days. 2. Patient will perform sit to stand with modified independence within 4 days. 3. Patient will ambulate with modified independence for 200 feet with the least restrictive device within 4 days. 4. Patient will ascend/descend 3 stairs with 1 handrail(s) with minimal assistance/contact guard assist within 4 days. 5. Patient will verbalize and demonstrate understanding of spinal precautions (No bending, lifting greater than 5 lbs, or twisting; log-roll technique; frequent repositioning as instructed) within 4 days. Outcome: Not Met   PHYSICAL THERAPY EVALUATION  Patient: Patricia Perry (70 y.o. male)  Date: 2/16/2022  Primary Diagnosis: Spinal stenosis, lumbar region, with neurogenic claudication [M48.062]  Lumbar stenosis with neurogenic claudication [M48.062]  Procedure(s) (LRB):  L2-L5 LAMINECTOMY (N/A) 1 Day Post-Op   Precautions:   Spinal      ASSESSMENT  Based on the objective data described below, the patient presents with decreased activity tolerance, additional time required for all functional mobility, weakness and overall reduced functional mobility in the setting of hospital admission for L2-L5 lami. Patient today performs bed mobility with minAx1-2, mainly for verbal cuing. He transfers sit <> stand with minAx1 and ambulates 5ft to bedside chair with RW and minAx1.  Patient with slow gait speed and low clearance for foot progression. Upon sitting in chair patient reporting no lightheadedness/dizziness/nausea. + SOB during session, but spo2 stable on room air. NC removed at conclusion of session as patient stable with activity on room air. Visit vitals   Position  Pulse Resp BP SpO2   02/16/22 0938 Seated post 5 ft ambulation to chair  98 -- (!) 158/72 93 %   02/16/22 0928 Seated  (!) 104 -- 135/74 91 %   02/16/22 0900 Semi fowlers 80 -- 120/70 95 %             Current Level of Function Impacting Discharge (mobility/balance): Syd    Functional Outcome Measure: The patient scored Total Score: 14/28 on the Tinetti outcome measure which is indicative of high fall risk. Other factors to consider for discharge: patient lives alone; unclear what, if any assistance would be available to him upon d/c. Patient will benefit from skilled therapy intervention to address the above noted impairments. PLAN :  Recommendations and Planned Interventions: bed mobility training, transfer training, gait training, therapeutic exercises, orthotic/prosthetic training, edema management/control, patient and family training/education and therapeutic activities      Frequency/Duration: Patient will be followed by physical therapy:  twice daily to address goals. Recommendation for discharge: (in order for the patient to meet his/her long term goals)  Physical therapy at least 2 days/week in the home AND ensure assist and/or supervision for safety with upright mobility    This discharge recommendation:  Has been made in collaboration with the attending provider and/or case management    IF patient discharges home will need the following DME: bariatric rolling walker         SUBJECTIVE:   Patient stated I was 6ft 8, but I think my stomach pulled me down a few inches.     OBJECTIVE DATA SUMMARY:   Patient received supine in bed and was agreeable to participate in PT session.    Patient was cleared by nursing to participate in PT session. HISTORY:    Past Medical History:   Diagnosis Date    Abnormal stress test 12/2021    Cardiac cath done and normal 1/13/22    Ankylosing spondylitis ([facility] [address] 1977    Dr. Maria Elena Viera.  neck, lumbar. sees pain management    Benign essential HTN     BPH (benign prostatic hyperplasia)     Dr. Lindwood Rubinstein. PSA 0.3 4/15/21, 0.6 5/1/20    Chronic edema     compression stockings    Colon polyp     hx.  last colonoscopy 2019.  COVID-19 vaccine series completed     DJD (degenerative joint disease) of cervical spine     Dr. Quevedo Counts (dyspnea on exertion)     History of basal cell cancer 2013    face- Dr. Lilibeth Carter. MOHS    History of bilateral knee replacement     Dr. Denise Chaney    History of DVT (deep vein thrombosis)     left leg.  Insomnia     takes ambien per Dr. Crockett  Kidney stone     Lumbar spinal stenosis 2011    Dr Yessy Bravo, Dr. Montes Poplin. MRI 9/2021, 9/2017. hx of injections, nerve ablation    Neuropathy     feet. due to spinal dz?  saw neurology. hx GTT    JUDY on CPAP     Dr. Perfecto Mcduffie    Psoriasis     Rheumatoid arthritis of cervical spine (HCC)     Limited ROM    SOB (shortness of breath) 2014    Dr. [address] Paz abnormal stress. cath 2014 per pt     Past Surgical History:   Procedure Laterality Date    HX COLONOSCOPY  10/15/2019    Dr. [email or web address]. normal (report received). repeat 5 years    HX COLONOSCOPY      hx polyps    HX HEART CATHETERIZATION  2014    Dr Gail Loja  01/03/2022    Dr. Randi Gonzales Maker Bilateral     HX LITHOTRIPSY      HX [address] PROCEDURES  2013?     facial BCC    HX SHOULDER ARTHROSCOPY Left     HX WRIST FRACTURE TX Right        Personal factors and/or comorbidities impacting plan of care: unclear living situation/support available     Home Situation  Home Environment: Private residence  # Steps to Enter: 3  Rails to Enter: Yes  Hand Rails : Right  One/Two Story Residence: One story  Living Alone: Yes  Support Systems: Spouse/Significant Other  Patient Expects to be Discharged to[de-identified] Home with family assistance  Current DME Used/Available at Home:  (hiking stick)  Tub or Shower Type: Tub/Shower combination    EXAMINATION/PRESENTATION/DECISION MAKING:   Critical Behavior:  Neurologic State: Drowsy  Orientation Level: Oriented X4  Cognition: Follows commands,Decreased attention/concentration  Safety/Judgement: Awareness of environment,Fall prevention,Home safety,Insight into deficits  Hearing: Auditory  Auditory Impairment: None  Skin:  All oberved intact; surgical dressing intact and clean  Edema: 1+ al LE edema   Range Of Motion:  AROM: Generally decreased, functional                       Strength:    Strength: Generally decreased, functional                    Tone & Sensation:   Tone: Normal              Sensation: Impaired (pt reports diminished to light touch al LE)               Coordination:  Coordination: Within functional limits  Vision:   Corrective Lenses: Reading glasses  Functional Mobility:  Bed Mobility:  Rolling: Minimum assistance;Assist x1 (verbal cues for sequencing)  Supine to Sit: Moderate assistance;Assist x1;Additional time  Sit to Supine: Minimum assistance;Assist x2  Scooting: Additional time  Transfers:  Sit to Stand: Minimum assistance;Assist x1 (elevated bed surface)  Stand to Sit: Minimum assistance;Assist x1 (elevated bed surface)        Bed to Chair: Minimum assistance;Assist x1              Balance:   Sitting: Intact; Without support  Standing: Impaired; With support  Standing - Static: Good  Standing - Dynamic : Fair;Constant support  Ambulation/Gait Training:  Distance (ft): 4 Feet (ft)  Assistive Device: Walker, rolling;Gait belt;Brace/Splint  Ambulation - Level of Assistance: Minimal assistance;Assist x1;Additional time        Gait Abnormalities: Decreased step clearance              Speed/Ginny: Pace decreased (<100 feet/min)  Step Length: Right shortened;Left shortened           Patient Education   Back precautions (no bending, lifting > 5 lbs, twisting)   Application, donning & use of LSO brace   Sitting for 30 minutes at a time    Functional Measure:  Tinetti test:    Sitting Balance: 1  Arises: 1  Attempts to Rise: 1  Immediate Standing Balance: 1  Standing Balance: 1  Nudged: 1  Eyes Closed: 0  Turn 360 Degrees - Continuous/Discontinuous: 0  Turn 360 Degrees - Steady/Unsteady: 0  Sitting Down: 1  Balance Score: 7 Balance total score  Indication of Gait: 1  R Step Length/Height: 1  L Step Length/Height: 1  R Foot Clearance: 1  L Foot Clearance: 1  Step Symmetry: 1  Step Continuity: 0  Path: 1  Trunk: 0  Walking Time: 0  Gait Score: 7 Gait total score  Total Score: 14/28 Overall total score         Tinetti Tool Score Risk of Falls  <19 = High Fall Risk  19-24 = Moderate Fall Risk  25-28 = Low Fall Risk  Tinetti ME. Performance-Oriented Assessment of Mobility Problems in Elderly Patients. [address] 66; [id number].  (Scoring Description: PT Bulletin Feb. 10, 1993)    Older adults: Perla Gaffney et al, 2009; n = 1000 [facility] elderly evaluated with ABC, SALOME, ADL, and IADL)  · Mean SALOME score for males aged 69-68 years = 26.21(3.40)  · Mean SALOME score for females age 69-68 years = 25.16(4.30)  · Mean SALOME score for males over 80 years = 23.29(6.02)  · Mean SALOME score for females over 80 years = 17.20(8.32)        Physical Therapy Evaluation Charge Determination   History Examination Presentation Decision-Making   MEDIUM  Complexity : 1-2 comorbidities / personal factors will impact the outcome/ POC  MEDIUM Complexity : 3 Standardized tests and measures addressing body structure, function, activity limitation and / or participation in recreation  LOW Complexity : Stable, uncomplicated  LOW Complexity : FOTO score of       Based on the above components, the patient evaluation is determined to be of the following complexity level: LOW     Pain Rating:   Patient with minimal complaints of pain during therapy; he does note headache which he attributes to a stiff neck post operatively  Headache present at initiation of therapy with patient in supine; does not appear to be positional      Activity Tolerance:   Fair, SpO2 stable on RA, requires rest breaks and observed SOB with activity      After treatment patient left in no apparent distress:   Sitting in chair, Call bell within reach, Bed / chair alarm activated and and OT planning on working with patient shortly    COMMUNICATION/EDUCATION:   The patients plan of care was discussed with: Occupational therapist, Registered nurse and Case management. Fall prevention education was provided and the patient/caregiver indicated understanding. and Patient/family have participated as able in goal setting and plan of care.     Thank you for this referral.  Maxim Chavez, PT   Time Calculation: 34 mins

## 2022-02-16 NOTE — PROGRESS NOTES
02/16/22    State Observation Letter was verbally explained to patient and provided in writing to patient. The patient was signed document. Medicare Outpatient Observation Notice (MOON)/ [place] Outpatient Observation Notice (Julian Shen) provided to patient/representative with verbal explanation of the notice. Time allotted for questions regarding the notice. Patient /representative provided a completed copy of the MOON/VOON notice. Copy placed on bedside chart.

## 2022-02-16 NOTE — PROGRESS NOTES
Problem: Falls - Risk of  Goal: *Absence of Falls  Description: Document Garcia Armando Fall Risk and appropriate interventions in the flowsheet.   Outcome: Progressing Towards Goal  Note: Fall Risk Interventions:  Mobility Interventions: Bed/chair exit alarm,Strengthening exercises (ROM-active/passive)       Medication Interventions: Bed/chair exit alarm,Patient to call before getting OOB    Elimination Interventions: Call light in reach,Bed/chair exit alarm       Problem: Patient Education: Go to Patient Education Activity  Goal: Patient/Family Education  Outcome: Progressing Towards Goal

## 2022-02-16 NOTE — PROGRESS NOTES
2/16/2022     3:47 PM  CM consult for bariatric RW noted. CM completed referral via [employer] to [facility]. [facility] accepted, and will deliver BRW 2/17/22.     9:27 AM  Care Management Assessment      Reason for Admission: Elective - Spinal Stenosis. Surgery required. Assessment:   [x]In person with pt   []Via p/c with pt   []With family member in person. Who/Relation:     []With family member via p/c. Who/Relation:   []Chart Review    RUR:  NA - OBS  Risk Level: [x]Low []Moderate []High  Value-based purchasing: [] Yes [x] No  Bundle patient: [] Yes [x] No   Specify:     Advance Directive: No Order. [] No AD on file. [] AD on file. [x] Current AD not on file. Copy requested. [] Requests AD, and referral submitted to [facility]. Healthcare Decision Maker:  Ban Sandoval - sister        Assessment:    Age: 80    Sex: [x] Male []Female     Residency: [x]Private residence []Apartment []Assisted Living []LTC []Other:   Exterior Steps: 3  Interior Steps: 0    Lives With: []With spouse []Other family members []Underage children [x]Alone []Care provider []Other:    Prior functioning:  [x]Independent with ADLs and iADLS []Dependent with ADLs and iADLs []Partial dependence, Specify:     Prior DME required:  [x]None []RW []Cane []Crutches []Bedside commode []CPAP []Home O2 (Liter/Provider: ) []Nebulizer   []Shower Chair []Wheelchair []Hospital Bed []Addi []Stair lift []Rollator []Other:    DME available: [x]None []RW []Cane []Crutches []Bedside commode []CPAP []Home O2 (Liter/Provider: ) []Nebulizer   []Shower Chair []Wheelchair []Hospital Bed []Addi []Stair lift []Rollator []Other:    Rehab history: [x]None []Outpatient PT []Home Health (Provider/Date: ) []SNF (Provider/Date: ) []IPR (Provider/Date: ) []LTC (Provider/Date: ) []Hospice (Provider/Date: )  []Other:     Discharge Concerns: []Yes [x]No []Unknown   Describe:    Comments:      Insurer:   Insurance Information [facility] [place] [facility] [employer] Phone: [phone]    Subscriber: Karlene Tristan Subscriber#: [id number]    Group#: -- Precert#: --        ANTHONY/LUISA [place] Phone: --    Subscriber: Karlene Hudson Subscriber#: [id number]    Group#: [id number] [id number] Precert#: --          PCP: Erika Mckeon   Address: [address] [address] / [facility] [address]   Phone number: [phone]   Current patient: [x]Yes []No   Approximate date of last visit: 12/5/2021   Access to virtual PCP visits: []Yes [x]No    Pharmacy:  [address] [place] [address] Transport: Family       Transition of care plan:    []Unable to determine at this time. Awaiting clinical progress, and disposition recommendations. [x] Home with outpatient follow-up    [] Home with Outpatient PT and outpatient follow-up   Pt aware of OP appt? []Yes, Provider:   []Not scheduled   Transport provider:     [] Home with family assistance as needed and outpatient follow-up   Family able to assist:    Schedule:  Transport provider:      [] Home with Home Health   - Provider:     []SNF/IPR   -[]Preferences given:   []Listing provided and preferences requested   -Status: []Pending []Accepted:    -Auth required: []Yes []No    -Auth initiated date:   -3 midnight stay required: []Yes []No  Date satisfied:     [] Home with Hospice   -Provider:     [] [facility] information provided. [] Other:     Verna Bal MA    Care Management Interventions  PCP Verified by CM: Yes ([place])  Mode of Transport at Discharge:  Other (see comment)  MyChart Signup: No  Discharge Durable Medical Equipment: No  Physical Therapy Consult: Yes  Occupational Therapy Consult: Yes  Speech Therapy Consult: No  Support Systems: Spouse/Significant Other  Confirm Follow Up Transport: Family  Discharge Location  Patient Expects to be Discharged to[de-identified] Home with family assistance

## 2022-02-16 NOTE — PROGRESS NOTES
ORTHOPAEDIC LUMBAR FUSION PROGRESS NOTE    NAME:     Tatiana Al. :       1950   MRN:       [id number]   DATE:      2022    POD:    1 Day Post-Op  S/P:    Procedure(s):  L2-L5 LAMINECTOMY    SUBJECTIVE:    C/O back pain along surgical incision  No leg pain or numbness  Denies nausea/vomiting, headache, chest pain or shortness of breath  Pain controlled    Recent Labs     22  0232   HGB 11.9*      K 4.3      CO2 30   BUN 16   CREA 0.93   *     Patient Vitals for the past 12 hrs:   BP Temp Pulse Resp SpO2   22 1108 105/68 98.3 °F (36.8 °C) 76 18 94 %   22 1004 133/66 -- 91 -- --   22 0956 136/64 -- 88 -- --   22 0938 (!) 158/72 -- 98 -- 93 %   22 0928 135/74 -- (!) 104 -- 91 %   22 0900 120/70 -- 80 -- 95 %   22 0751 137/62 98.5 °F (36.9 °C) 77 18 97 %   22 0252 110/62 98.9 °F (37.2 °C) -- 18 --       EXAM:  Dressings clean, dry and intact   Positive strength/ROM bilat lower ext.   Neuro intact to sensation  Calves, soft & nontender  BL LEs NVID      PLAN:  D/C PCA today, change to prn PO pain medications  Monitor hemovac drain output  PT/OT, OOB w/ assist  Advance diet as tolerated  D/C domitila Duong, [address]  Orthopaedic Surgery  Physician Assistant to Dr. Stan Benavides

## 2022-02-16 NOTE — PROGRESS NOTES
Problem: Mobility Impaired (Adult and Pediatric)  Goal: *Acute Goals and Plan of Care (Insert Text)  Description: FUNCTIONAL STATUS PRIOR TO ADMISSION: Patient was modified independent using a walking stick for functional mobility. HOME SUPPORT PRIOR TO ADMISSION: The patient lived alone with no local support. Pt does not give full answers for his home support. 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 Physical Therapy Goals  Initiated 2/16/2022    1. Patient will move from supine to sit and sit to supine  in bed with independence within 4 days. 2. Patient will perform sit to stand with modified independence within 4 days. 3. Patient will ambulate with modified independence for 200 feet with the least restrictive device within 4 days. 4. Patient will ascend/descend 3 stairs with 1 handrail(s) with minimal assistance/contact guard assist within 4 days. 5. Patient will verbalize and demonstrate understanding of spinal precautions (No bending, lifting greater than 5 lbs, or twisting; log-roll technique; frequent repositioning as instructed) within 4 days. 2/16/2022 1535 by July Zimmerman, PT  Outcome: Progressing Towards Goal  2/16/2022 1134 by July Zimmerman, PT  Outcome: Not Met   PHYSICAL THERAPY TREATMENT  Patient: Zachary Enriquez. (70 y.o. male)  Date: 2/16/2022  Diagnosis: Spinal stenosis, lumbar region, with neurogenic claudication [M48.062]  Lumbar stenosis with neurogenic claudication [M48.062] <principal problem not specified>  Procedure(s) (LRB):  L2-L5 LAMINECTOMY (N/A) 1 Day Post-Op  Precautions: Fall,Spinal,Other (comment) (brace) No bending, no lifting greater than 5 lbs, no twisting, log-roll technique, repositioning every 20-30 min except when sleeping, brace when OOB (if ordered)  Chart, physical therapy assessment, plan of care and goals were reviewed.     ASSESSMENT  Patient continues with skilled PT services and is progressing towards goals. Patient this afternoon with good tolerance and participation in physical therapy session. Patient tolerates increased ambulation distance, 20ft with RW and CGA. He continues to require additional time and effort for all bed mobility. Patient requires cuing to breathe with bed mobility and he is with tendency to hold his breath, turning his face bright red with effort. Vitals stable as below and patient reports no lightheadedness/dizziness/nausea throughout session. Vitals:    02/16/22 1300 02/16/22 1330 02/16/22 1343   BP: (!) 159/79 (!) 154/84 (!) 142/77   BP 1 Location: Left upper arm Left upper arm Right upper arm   BP Patient Position: Semi fowlers Sitting Sitting  Comment: post 20ft ambulation   Pulse: 78 (!) 103 98   Temp:      Resp:      Height:      Weight:      SpO2: 97% 96% 96%          Current Level of Function Impacting Discharge (mobility/balance): minAx1/CGA    Other factors to consider for discharge: patient this afternoon telling PT that his friend will be able to stay with him overnight for a few days upon d/c. PLAN :  Patient continues to benefit from skilled intervention to address the above impairments. Continue treatment per established plan of care. to address goals. Recommendation for discharge: (in order for the patient to meet his/her long term goals)  Physical therapy at least 2 days/week in the home AND ensure supervision for safety with upright mobility    This discharge recommendation:  Has been made in collaboration with the attending provider and/or case management    IF patient discharges home will need the following DME: bariatric RW - CM order placed        SUBJECTIVE:   Patient stated Melody Dubon that would be good.  re: bariatric RW    OBJECTIVE DATA SUMMARY:   Patient received supine in bed and was agreeable to participate in PT session. Patient was cleared by nursing to participate in PT session.        Critical Behavior:  Neurologic State: Alert  Orientation Level: Oriented X4  Cognition: Appropriate decision making,Appropriate for age attention/concentration,Appropriate safety awareness,Follows commands  Safety/Judgement: Awareness of environment,Fall prevention,Home safety,Insight into deficits    Spinal diagnosis intervention:  The patient stated 1/3 back precautions when prompted. Reviewed all 3 back precautions, log roll technique, and sitting for 30 minutes at a time. The patient required minimal verbal cues to maintain back precautions during functional activity. Reviewed back brace application and wear schedule. Brace donned with supervision/set-up      Functional Mobility Training:    Bed Mobility:  Log Rolling: Stand-by assistance; Additional time (with verbal cuing)  Supine to Sit: Minimum assistance;Assist x1;Additional time  Sit to Supine: Minimum assistance;Assist x2  Scooting: Stand-by assistance; Additional time (verbal cuing)        Transfers:  Sit to Stand: Minimum assistance;Assist x1  Stand to Sit: Minimum assistance;Assist x1        Bed to Chair: Minimum assistance;Contact guard assistance                    Balance:  Sitting: Intact; With support  Standing: Impaired; Without support  Standing - Static: Good  Standing - Dynamic : Fair;Constant support  Ambulation/Gait Training:  Distance (ft): 20 Feet (ft)  Assistive Device: Gait belt;Brace/Splint; Walker, rolling  Ambulation - Level of Assistance: Minimal assistance;Contact guard assistance        Gait Abnormalities: Decreased step clearance              Speed/Ginny: Pace decreased (<100 feet/min)  Step Length: Right shortened;Left shortened                  Pain Rating:  Patient notes headache from cervical tension and minimal surgical site pain; HA does not appear to be positional    Activity Tolerance:   Fair, requires rest breaks, and observed SOB with activity    After treatment patient left in no apparent distress:   Sitting in chair, Call bell within reach, and Bed / chair alarm activated    COMMUNICATION/COLLABORATION:   The patients plan of care was discussed with: Occupational therapist, Registered nurse, and Case management.      Natalie Cortés PT, DPT   Time Calculation: 23 mins

## 2022-02-16 NOTE — ANESTHESIA POSTPROCEDURE EVALUATION
Procedure(s):  L2-L5 LAMINECTOMY. general    Anesthesia Post Evaluation      Multimodal analgesia: multimodal analgesia not used between 6 hours prior to anesthesia start to PACU discharge  Patient location during evaluation: PACU  Patient participation: complete - patient participated  Level of consciousness: awake  Pain management: adequate  Airway patency: patent  Anesthetic complications: no  Cardiovascular status: acceptable, blood pressure returned to baseline and hemodynamically stable  Respiratory status: acceptable  Hydration status: acceptable  Post anesthesia nausea and vomiting:  controlled      INITIAL Post-op Vital signs:   Vitals Value Taken Time   /52 02/15/22 1900   Temp 36.8 °C (98.2 °F) 02/15/22 1718   Pulse 60 02/15/22 1904   Resp 7 02/15/22 1904   SpO2 96 % 02/15/22 1904   Vitals shown include unvalidated device data.

## 2022-02-16 NOTE — PROGRESS NOTES
Problem: Self Care Deficits Care Plan (Adult)  Goal: *Acute Goals and Plan of Care (Insert Text)  Description: FUNCTIONAL STATUS PRIOR TO ADMISSION: Patient was modified independent using a long walking stick for functional mobility. History of back pain, worse last 10 years, hx of RA and numbness in thighs. HOME SUPPORT: The patient lived alone with no local support. Occupational Therapy Goals  Initiated 2/16/2022    1. Patient will perform lower body dressing with modified independence using Reacher, Stocking Aid, Long [employer], and Dressing Stick PRN within 4 days. 2.  Patient will perform toileting and toilet transfer with modified independence using most appropriate DME within 4 days. 3.  Patient will stand for functional activity > or = 8 minutes at modified independence within 4 days. 4.  Patient will don/doff back brace at modified independence within 4 days. 5.  Patient will verbalize/demonstrate 3/3 back precautions during ADL tasks without cues within 4 days. Outcome: Not Met     OCCUPATIONAL THERAPY EVALUATION  Patient: Cydney Horne (70 y.o. male)  Date: 2/16/2022  Primary Diagnosis: Spinal stenosis, lumbar region, with neurogenic claudication [M48.062]  Lumbar stenosis with neurogenic claudication [M48.062]  Procedure(s) (LRB):  L2-L5 LAMINECTOMY (N/A) 1 Day Post-Op   Precautions:   Spinal, brace    ASSESSMENT  Based on the objective data described below, the patient presents with decreased activity tolerance, received after sitting in chair ~15 minutes and pressing pain pump. Noted to have increased drowsiness throughout tx and diaphoretic, required assist of 2 for safety to return back to bed. Initiated instruction on use of hip kit and benefit, patient reported he will likely purchase on [employer] Educated on log roll technique and required multiple cues and re-education. Recommend use of hip kit next visit and transfer to heavy duty bedside commode.     Current Level of Function Impacting Discharge (ADLs/self-care): total assist LE ADL's, min assist of 2 for stand pivot transfer, max assist toileting    Functional Outcome Measure: The patient scored 40/100 on the Barthel Index outcome measure which is indicative of partially dependent. 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 PLAN :  Recommendations and Planned Interventions: self care training, functional mobility training, therapeutic exercise, balance training, therapeutic activities, endurance activities, patient education, home safety training, and family training/education    Frequency/Duration: Patient will be followed by occupational therapy 5 times a week to address goals. Recommendation for discharge: (in order for the patient to meet his/her long term goals)  Occupational therapy at least 2 days/week in the home AND ensure assist and/or supervision for safety with functional transfers, ADL's and IADL's    This discharge recommendation:  Has not yet been discussed the attending provider and/or case management    IF patient discharges home will need the following DME: AE: long handled dressing and ? Raised toilet seate       SUBJECTIVE:   Patient stated I shouldn't of pushed that button.  re: pain button    OBJECTIVE DATA SUMMARY:   HISTORY:   Past Medical History:   Diagnosis Date    Abnormal stress test 12/2021    Cardiac cath done and normal 1/13/22    Ankylosing spondylitis ([facility] [address] 1977    Dr. Zeeshan Bledsoe.  neck, lumbar. sees pain management    Benign essential HTN     BPH (benign prostatic hyperplasia)     Dr. Alba Mesa.   PSA 0.3 4/15/21, 0.6 5/1/20    Chronic edema     compression stockings    Colon polyp     hx.  last colonoscopy 2019.  COVID-19 vaccine series completed     DJD (degenerative joint disease) of cervical spine     Dr. Natalia Sparks (dyspnea on exertion)     History of basal cell cancer 2013    face- Dr. Gerri Ramos. [facility]    History of bilateral knee replacement     Dr. Pancho Epps    History of DVT (deep vein thrombosis)     left leg.  Insomnia     takes ambien per Dr. Arianne Veliz Kidney stone     Lumbar spinal stenosis 2011    Dr Glenna Ashby, Dr. Stella Freitas. MRI 9/2021, 9/2017. hx of injections, nerve ablation    Neuropathy     feet. due to spinal dz?  saw neurology. hx GTT    JUDY on CPAP     Dr. Dewayne Parks    Psoriasis     Rheumatoid arthritis of cervical spine (HCC)     Limited ROM    SOB (shortness of breath) 2014    Dr. Ayse Finch abnormal stress. cath 2014 per pt     Past Surgical History:   Procedure Laterality Date    HX COLONOSCOPY  10/15/2019    Dr. [email or web address] normal (report received). repeat 5 years    HX COLONOSCOPY      hx polyps    HX HEART CATHETERIZATION  2014    Dr Murali Aleman  01/03/2022    Dr. Ryan Evans Bilateral     HX LITHOTRIPSY      HX [address] PROCEDURES  2013?     facial BCC    HX SHOULDER ARTHROSCOPY Left     HX WRIST FRACTURE TX Right        Expanded or extensive additional review of patient history:     Home Situation  Home Environment: Private residence  # Steps to Enter: 3  Rails to Enter: Yes  Hand Rails : Right  One/Two Story Residence: One story  Living Alone: Yes  Support Systems: Spouse/Significant Other  Patient Expects to be Discharged to[de-identified] Home with family assistance  Current DME Used/Available at Home:  (hiking stick)  Tub or Shower Type: Tub/Shower combination    Hand dominance:     EXAMINATION OF PERFORMANCE DEFICITS:  Cognitive/Behavioral Status:  Neurologic State: Drowsy  Orientation Level: Oriented X4  Cognition: Follows commands;Decreased attention/concentration  Perception: Appears intact  Perseveration: No perseveration noted  Safety/Judgement: Awareness of environment; Fall prevention;Home safety; Insight into deficits    Skin: dressing and drain intact    Edema: bilateral LE's    Hearing: Auditory  Auditory Impairment: None    Vision/Perceptual:       Corrective Lenses: Reading glasses    Range of Motion:  AROM: Generally decreased, functional        Strength:  Strength: Generally decreased, functional        Coordination:  Coordination: Within functional limits       Gross Motor Skills-Upper: Left Intact; Right Intact    Tone & Sensation:  Tone: Normal   Sensation: intact UE, hx of numbness in thighs     Balance:  Sitting: Intact; Without support  Standing: Impaired; Without support  Standing - Static: Constant support;Fair;Good  Standing - Dynamic : Not tested    Functional Mobility and Transfers for ADLs:  Bed Mobility:  Rolling: Moderate assistance;Assist x1;Other (comment) (educated on log roll)  Sit to Supine: Minimum assistance; Moderate assistance;Assist x1;Additional time  Scooting: Moderate assistance;Assist x1;Additional time    Transfers:  Sit to Stand: Minimum assistance;Assist x2; Additional time; Adaptive equipment (from elevated height)  Stand to Sit: Minimum assistance;Assist x2;Adaptive equipment (elevated height required)  Bed to Chair: Minimum assistance;Assist x2; Additional time; Adaptive equipment  Bathroom Mobility: Dependent/total assistance  Toilet Transfer : Minimum assistance;Assist x2;Adaptive equipment; Additional time (stand pivot (infer from chair transfer))    ADL Assessment:  Feeding: Modified independent    Oral Facial Hygiene/Grooming: Supervision (instructed on compensatory techniques)       Upper Body Dressing: Minimum assistance    Lower Body Dressing: Total assistance    Toileting: Maximum assistance; Total assistance (ramesh)           ADL Intervention and task modifications:     Educated on role of OT, back precautions    Positioning: Educated on positioning in supine and sidelying to facilitate neutral alignment and increased comfort, provided visual demonstration to increase carryover     Instructed on contents of hip kit and benefit due to precautions     Educated on optimal sitting position with hips higher than knees, performed sit to stand with assist of 1, placed 3 folded blankets to increase height of chair ~5\" and demonstrated increased comfort     Educated on need to call for assist, noted to be diaphoretic, unable to say how feeling, stated \"I really can't say how I feel\" noted to have decreased blood pressure and required to get back to bed    Educated on pain management and timing of meds, educated on need for meds prior to movement    Cognitive Retraining  Safety/Judgement: Awareness of environment; Fall prevention;Home safety; Insight into deficits       Functional Measure:    Barthel Index:  Bathin  Bladder: 0  Bowels: 10  Groomin  Dressin  Feeding: 10  Mobility: 0  Stairs: 0  Toilet Use: 5  Transfer (Bed to Chair and Back): 5  Total: 40/100      The Barthel ADL Index: Guidelines  1. The index should be used as a record of what a patient does, not as a record of what a patient could do. 2. The main aim is to establish degree of independence from any help, physical or verbal, however minor and for whatever reason. 3. The need for supervision renders the patient not independent. 4. A patient's performance should be established using the best available evidence. Asking the patient, friends/relatives and nurses are the usual sources, but direct observation and common sense are also important. However direct testing is not needed. 5. Usually the patient's performance over the preceding 24-48 hours is important, but occasionally longer periods will be relevant. 6. Middle categories imply that the patient supplies over 50 per cent of the effort.   7. Use of aids to be independent is allowed. Score Interpretation (from [facility])    Independent   60-79 Minimally independent   40-59 Partially dependent   20-39 Very dependent   <20 Totally dependent     -Ramon Sosa., Barthel, D.W. (1965). Functional evaluation: the Barthel Index. [address] ([address] (1997). The Barthel activities of daily living index: self-reporting versus actual performance in the old (> or = 75 years). Journal of [address] [place] [address] 45(7), [facility], J...F, Shakira Longo., Roseann Hinojosa. (1999). Measuring the change in disability after inpatient rehabilitation; comparison of the responsiveness of the Barthel Index and Functional [place] Measure. Journal of Neurology, Neurosurgery, and Psychiatry, 66(4), 722-869. JEAN Covarrubias, MINNIE Flores, & Bonny Kuhn M.A. (2004) Assessment of post-stroke quality of life in cost-effectiveness studies: The usefulness of the Barthel Index and the EuroQoL-5D. Quality of Life Research, 15, 902-78         Occupational Therapy Evaluation Charge Determination   History Examination Decision-Making   LOW Complexity : Brief history review  LOW Complexity : 1-3 performance deficits relating to physical, cognitive , or psychosocial skils that result in activity limitations and / or participation restrictions  MEDIUM Complexity : Patient may present with comorbidities that affect occupational performnce.  Miniml to moderate modification of tasks or assistance (eg, physical or verbal ) with assesment(s) is necessary to enable patient to complete evaluation       Based on the above components, the patient evaluation is determined to be of the following complexity level: LOW   Pain Rating:  Not rated, pressed pain pump on arrival and with decreased activity tolerance, diaphoretic, eyes closing during conversation    Activity Tolerance:   Fair, Poor, and signs and symptoms of orthostatic hypotension    After treatment patient left in no apparent distress:    Supine in bed, Call bell within reach, and Side rails x 3    COMMUNICATION/EDUCATION:   The patients plan of care was discussed with: Physical therapist and Registered nurse. Home safety education was provided and the patient/caregiver indicated understanding. and Patient/family have participated as able in goal setting and plan of care. This patients plan of care is appropriate for delegation to [facility].     Thank you for this referral.  Rosamaria Fernandez OTR/L  Time Calculation: 40 mins

## 2022-02-17 LAB
ANION GAP SERPL CALC-SCNC: 5 MMOL/L (ref 5–15)
APPEARANCE UR: CLEAR
BACTERIA URNS QL MICRO: NEGATIVE /HPF
BILIRUB UR QL: NEGATIVE
BUN SERPL-MCNC: 10 MG/DL (ref 6–20)
BUN/CREAT SERPL: 12 (ref 12–20)
CALCIUM SERPL-MCNC: 8.2 MG/DL (ref 8.5–10.1)
CHLORIDE SERPL-SCNC: 103 MMOL/L (ref 97–108)
CO2 SERPL-SCNC: 28 MMOL/L (ref 21–32)
COLOR UR: ABNORMAL
CREAT SERPL-MCNC: 0.84 MG/DL (ref 0.7–1.3)
EPITH CASTS URNS QL MICRO: ABNORMAL /LPF
GLUCOSE SERPL-MCNC: 116 MG/DL (ref 65–100)
GLUCOSE UR STRIP.AUTO-MCNC: NEGATIVE MG/DL
HGB BLD-MCNC: 10.7 G/DL (ref 12.1–17)
HGB UR QL STRIP: NEGATIVE
HYALINE CASTS URNS QL MICRO: ABNORMAL /LPF (ref 0–5)
KETONES UR QL STRIP.AUTO: ABNORMAL MG/DL
LEUKOCYTE ESTERASE UR QL STRIP.AUTO: NEGATIVE
NITRITE UR QL STRIP.AUTO: NEGATIVE
PH UR STRIP: 6 [PH] (ref 5–8)
POTASSIUM SERPL-SCNC: 3.5 MMOL/L (ref 3.5–5.1)
PROT UR STRIP-MCNC: NEGATIVE MG/DL
RBC #/AREA URNS HPF: ABNORMAL /HPF (ref 0–5)
SODIUM SERPL-SCNC: 136 MMOL/L (ref 136–145)
SP GR UR REFRACTOMETRY: 1.01 (ref 1–1.03)
UA: UC IF INDICATED,UAUC: ABNORMAL
UROBILINOGEN UR QL STRIP.AUTO: 1 EU/DL (ref 0.2–1)
WBC URNS QL MICRO: ABNORMAL /HPF (ref 0–4)

## 2022-02-17 PROCEDURE — 74011000250 HC RX REV CODE- 250: Performed by: ORTHOPAEDIC SURGERY

## 2022-02-17 PROCEDURE — 74011250637 HC RX REV CODE- 250/637: Performed by: NURSE PRACTITIONER

## 2022-02-17 PROCEDURE — 36415 COLL VENOUS BLD VENIPUNCTURE: CPT

## 2022-02-17 PROCEDURE — 97530 THERAPEUTIC ACTIVITIES: CPT

## 2022-02-17 PROCEDURE — 85018 HEMOGLOBIN: CPT

## 2022-02-17 PROCEDURE — 94761 N-INVAS EAR/PLS OXIMETRY MLT: CPT

## 2022-02-17 PROCEDURE — 97535 SELF CARE MNGMENT TRAINING: CPT

## 2022-02-17 PROCEDURE — 97116 GAIT TRAINING THERAPY: CPT

## 2022-02-17 PROCEDURE — 81001 URINALYSIS AUTO W/SCOPE: CPT

## 2022-02-17 PROCEDURE — 80048 BASIC METABOLIC PNL TOTAL CA: CPT

## 2022-02-17 PROCEDURE — 74011250637 HC RX REV CODE- 250/637: Performed by: ORTHOPAEDIC SURGERY

## 2022-02-17 RX ORDER — ONDANSETRON 2 MG/ML
4 INJECTION INTRAMUSCULAR; INTRAVENOUS
Status: DISCONTINUED | OUTPATIENT
Start: 2022-02-17 | End: 2022-02-18 | Stop reason: HOSPADM

## 2022-02-17 RX ORDER — ZOLPIDEM TARTRATE 5 MG/1
5 TABLET ORAL
Status: DISCONTINUED | OUTPATIENT
Start: 2022-02-17 | End: 2022-02-18 | Stop reason: HOSPADM

## 2022-02-17 RX ADMIN — TAMSULOSIN HYDROCHLORIDE 0.4 MG: 0.4 CAPSULE ORAL at 10:18

## 2022-02-17 RX ADMIN — ACETAMINOPHEN 650 MG: 325 TABLET ORAL at 10:17

## 2022-02-17 RX ADMIN — ZOLPIDEM TARTRATE 5 MG: 5 TABLET ORAL at 21:23

## 2022-02-17 RX ADMIN — FAMOTIDINE 20 MG: 20 TABLET ORAL at 10:18

## 2022-02-17 RX ADMIN — HYDROCHLOROTHIAZIDE 12.5 MG: 25 TABLET ORAL at 10:18

## 2022-02-17 RX ADMIN — Medication 10 ML: at 05:50

## 2022-02-17 RX ADMIN — POLYETHYLENE GLYCOL 3350 17 G: 17 POWDER, FOR SOLUTION ORAL at 10:17

## 2022-02-17 RX ADMIN — VALSARTAN 160 MG: 80 TABLET, FILM COATED ORAL at 10:18

## 2022-02-17 RX ADMIN — OXYCODONE 5 MG: 5 TABLET ORAL at 14:25

## 2022-02-17 RX ADMIN — OXYCODONE 5 MG: 5 TABLET ORAL at 10:17

## 2022-02-17 RX ADMIN — DOCUSATE SODIUM 50MG AND SENNOSIDES 8.6MG 1 TABLET: 8.6; 5 TABLET, FILM COATED ORAL at 18:20

## 2022-02-17 RX ADMIN — OXYCODONE 10 MG: 5 TABLET ORAL at 21:26

## 2022-02-17 RX ADMIN — OXYCODONE 5 MG: 5 TABLET ORAL at 06:37

## 2022-02-17 RX ADMIN — Medication 10 ML: at 15:21

## 2022-02-17 RX ADMIN — OXYCODONE 10 MG: 5 TABLET ORAL at 02:14

## 2022-02-17 RX ADMIN — DOCUSATE SODIUM 50MG AND SENNOSIDES 8.6MG 1 TABLET: 8.6; 5 TABLET, FILM COATED ORAL at 10:18

## 2022-02-17 RX ADMIN — FAMOTIDINE 20 MG: 20 TABLET ORAL at 18:20

## 2022-02-17 RX ADMIN — Medication 10 ML: at 22:00

## 2022-02-17 RX ADMIN — ACETAMINOPHEN 650 MG: 325 TABLET ORAL at 18:24

## 2022-02-17 NOTE — PROGRESS NOTES
Problem: Mobility Impaired (Adult and Pediatric)  Goal: *Acute Goals and Plan of Care (Insert Text)  Description: FUNCTIONAL STATUS PRIOR TO ADMISSION: Patient was modified independent using a walking stick for functional mobility. HOME SUPPORT PRIOR TO ADMISSION: The patient lived alone with no local support. Pt does not give full answers for his home support. 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 Physical Therapy Goals  Initiated 2/16/2022    1. Patient will move from supine to sit and sit to supine  in bed with independence within 4 days. 2. Patient will perform sit to stand with modified independence within 4 days. 3. Patient will ambulate with modified independence for 200 feet with the least restrictive device within 4 days. 4. Patient will ascend/descend 3 stairs with 1 handrail(s) with minimal assistance/contact guard assist within 4 days. 5. Patient will verbalize and demonstrate understanding of spinal precautions (No bending, lifting greater than 5 lbs, or twisting; log-roll technique; frequent repositioning as instructed) within 4 days. Outcome: Progressing Towards Goal   PHYSICAL THERAPY TREATMENT  Patient: Barney Barboza (70 y.o. male)  Date: 2/17/2022  Diagnosis: Spinal stenosis, lumbar region, with neurogenic claudication [M48.062]  Lumbar stenosis with neurogenic claudication [M48.062] <principal problem not specified>  Procedure(s) (LRB):  L2-L5 LAMINECTOMY (N/A) 2 Days Post-Op  Precautions: Fall,Spinal,Other (comment) (brace) No bending, no lifting greater than 5 lbs, no twisting, log-roll technique, repositioning every 20-30 min except when sleeping, brace when OOB (if ordered)  Chart, physical therapy assessment, plan of care and goals were reviewed. ASSESSMENT  Patient continues with skilled PT services and is progressing towards goals.  Patient this morning with fair tolerance and participation in physical therapy treatment. Patient continues to hold breath during bed mobility requiring extra time and effort for log roll. Patient blood pressure with steady decline during session, patient diaphoretic, however reports no symptoms of lightheadedness/dizziness during treatment. Discussed with ortho NP  - anticipate blood pressure spikes secondary to valsalva during bed mobility. At end of session, patient blood pressure similar to pre-session values: 124/69. Mobility wise, patient transferring ambulating with decreased assistance, CGA and SBA with RW for 100ft. There is no instability or loss of balance. Provided elevated bedside commode surface into patient's rest room to assist with toilet transfer. Discussed out of pocket potential for bedside commode should patient require one at home. Vitals:    02/17/22 1045 02/17/22 1058 02/17/22 1100 02/17/22 1102   BP: (!) 172/77 (!) 150/67 131/67 139/63   BP 1 Location: Left upper arm Left upper arm Left upper arm Left upper arm   BP Patient Position: Sitting post bed mobility Sitting  Comment: post 100 ft ambulation Sitting Sitting   Pulse: 92 97 95 88   Temp:       Resp:       Height:       Weight:       SpO2: 97% 94% 94% 93%          Current Level of Function Impacting Discharge (mobility/balance): CGA/SBA    Other factors to consider for discharge: none additional         PLAN :  Patient continues to benefit from skilled intervention to address the above impairments. Continue treatment per established plan of care. to address goals.     Recommendation for discharge: (in order for the patient to meet his/her long term goals)  Physical therapy at least 2 days/week in the home AND ensure supervision for safety with bed mobility and ambulation    This discharge recommendation:  Has been made in collaboration with the attending provider and/or case management    IF patient discharges home will need the following DME: bariatric RW       SUBJECTIVE:   Patient stated did you order my walker? Cristel Irizarry  Patient also notes he would prefer to stay another night in the hospital - explained need for medical and therapeutic clearance before d/c will be appropriate. OBJECTIVE DATA SUMMARY:   Patient received supine in bed and was agreeable to participate in PT session. Patient was cleared by nursing to participate in PT session. Critical Behavior:  Neurologic State: Alert  Orientation Level: Oriented X4  Cognition: Follows commands  Safety/Judgement: Awareness of environment,Fall prevention,Home safety,Insight into deficits    Spinal diagnosis intervention:  The patient stated 2/3 back precautions when prompted. Reviewed all 3 back precautions, log roll technique, and sitting for 30 minutes at a time. The patient required minimal verbal cues to maintain back precautions during functional activity. Reviewed back brace application and wear schedule. Brace donned with supervision/set-up      Functional Mobility Training:    Bed Mobility:  Log Rolling: Stand-by assistance; Additional time (additional effort)  Supine to Sit: Additional time;Stand-by assistance     Scooting: Stand-by assistance; Additional time        Transfers:  Sit to Stand: Contact guard assistance; Additional time  Stand to Sit: Contact guard assistance;Assist x1        Bed to Chair: Contact guard assistance;Stand-by assistance;Assist x1                    Balance:  Sitting: Intact; With support  Standing: Impaired; Without support  Standing - Static: Good  Standing - Dynamic : Fair  Ambulation/Gait Training:  Distance (ft): 100 Feet (ft)  Assistive Device: Walker, rolling;Gait belt;Brace/Splint  Ambulation - Level of Assistance: Contact guard assistance;Stand-by assistance        Gait Abnormalities: Decreased step clearance              Speed/Ginny: Pace decreased (<100 feet/min)  Step Length: Left shortened;Right shortened          Pain Rating:  Patient with minimal complaints of pain during therapy  Patient was premedicated prior to session. He continues to note headache and neck pain during session. States his headaches are consistent with his pre-admission headaches     Activity Tolerance:   Fair and tolerates ADLs without rest breaks    After treatment patient left in no apparent distress:   Sitting in chair, Call bell within reach, and Bed / chair alarm activated    COMMUNICATION/COLLABORATION:   The patients plan of care was discussed with: Occupational therapy assistant, Registered nurse, and ortho NP .      Aldo Villegas PT, DPT   Time Calculation: 30 mins

## 2022-02-17 NOTE — PROGRESS NOTES
ORTHOPAEDIC LUMBAR FUSION PROGRESS NOTE    NAME:     Sharon Leroy. :       1950   MRN:       [id number]   DATE:      2022    POD:    2 Days Post-Op  S/P:    Procedure(s):  L2-L5 LAMINECTOMY    SUBJECTIVE:    C/O back pain along surgical incision  No leg pain or numbness  Had a HA previously due to his neck ankylosing spondylitis, not positional, resolved  Sometimes feels nauseous  Denies vomiting, positional headache, chest pain or shortness of breath  Pain controlled    Recent Labs     22  0217   HGB 10.7*      K 3.5      CO2 28   BUN 10   CREA 0.84   *     Patient Vitals for the past 12 hrs:   BP Temp Pulse Resp SpO2   22 0336 117/68 98.1 °F (36.7 °C) 73 16 92 %   22 2227 (!) 151/50 98.7 °F (37.1 °C) 72 16 95 %       EXAM:  Dressings clean, dry and intact   Positive strength/ROM bilat lower ext.   Neuro intact to sensation  Calves, soft & nontender  BL LEs NVID      PLAN:  Continue prn PO pain medications  Monitor hemovac drain output  PT/OT, OOB w/ assist  Tolerating diet  Prn NAOMI Holloway  Orthopaedic Surgery  Physician Assistant to Dr. Dior Patel

## 2022-02-17 NOTE — PROGRESS NOTES
Problem: Self Care Deficits Care Plan (Adult)  Goal: *Acute Goals and Plan of Care (Insert Text)  Description: FUNCTIONAL STATUS PRIOR TO ADMISSION: Patient was modified independent using a long walking stick for functional mobility. History of back pain, worse last 10 years, hx of RA and numbness in thighs. HOME SUPPORT: The patient lived alone with no local support. Occupational Therapy Goals  Initiated 2/16/2022    1. Patient will perform lower body dressing with modified independence using Reacher, Stocking Aid, Long [employer], and Dressing Stick PRN within 4 days. 2.  Patient will perform toileting and toilet transfer with modified independence using most appropriate DME within 4 days. 3.  Patient will stand for functional activity > or = 8 minutes at modified independence within 4 days. 4.  Patient will don/doff back brace at modified independence within 4 days. 5.  Patient will verbalize/demonstrate 3/3 back precautions during ADL tasks without cues within 4 days. Outcome: Progressing Towards Goal   OCCUPATIONAL THERAPY TREATMENT  Patient: Willem Adams (70 y.o. male)  Date: 2/17/2022  Diagnosis: Spinal stenosis, lumbar region, with neurogenic claudication [M48.062]  Lumbar stenosis with neurogenic claudication [M48.062] <principal problem not specified>  Procedure(s) (LRB):  L2-L5 LAMINECTOMY (N/A) 2 Days Post-Op  Precautions: Fall,Spinal,Other (comment) (brace)  Chart, occupational therapy assessment, plan of care, and goals were reviewed. ASSESSMENT  Patient continues with skilled OT services and is progressing towards goals. Pt dons back brace seated edge of bed. Sit to stand contact guard and pt ambulated around the bed to sit in chair. Pt practiced using reacher and sock aide and min assist overall to fully don socks but good understanding of AE for LB tasks. Vitals stable following tx.      Current Level of Function Impacting Discharge (ADLs): Min assist LB dress    Other factors to consider for discharge:          PLAN :  Patient continues to benefit from skilled intervention to address the above impairments. Continue treatment per established plan of care to address goals. Recommend with staff: out of bed to chair for ADl's, there ex, there act, meals    Recommend next OT session: cont towards goals    Recommendation for discharge: (in order for the patient to meet his/her long term goals)  Occupational therapy at least 2 days/week in the home     This discharge recommendation:  Has not yet been discussed the attending provider and/or case management    IF patient discharges home will need the following DME:        SUBJECTIVE:   Patient stated I have had arthritis for a long time    OBJECTIVE DATA SUMMARY:   Cognitive/Behavioral Status:  Neurologic State: Alert  Orientation Level: Oriented X4  Cognition: Follows commands             Functional Mobility and Transfers for ADLs:  Bed Mobility:  Rolling: Stand-by assistance; Additional time (additional effort)  Supine to Sit: Additional time;Stand-by assistance  Scooting: Stand-by assistance; Additional time    Transfers:  Sit to Stand: Contact guard assistance; Additional time     Bed to Chair: Contact guard assistance;Stand-by assistance;Assist x1    Balance:  Sitting: Intact; With support  Standing: Impaired; Without support  Standing - Static: Good  Standing - Dynamic : Fair    ADL Intervention:                      Upper Body Dressing Assistance  Dressing Assistance: Contact guard assistance  Hospital Gown: Contact guard assistance    Lower Body Dressing Assistance  Socks: Minimum assistance  Position Performed: Seated in chair  Cues: Physical assistance  Adaptive Equipment Used: Reacher;Sock aid            Activity Tolerance:   Fair    After treatment patient left in no apparent distress:   Sitting in chair    COMMUNICATION/COLLABORATION:   The patients plan of care was discussed with: Physical therapist, Occupational therapist, and Registered nurse.      NICHOLAS Tesfaye/L  Time Calculation: 20 mins

## 2022-02-17 NOTE — PROGRESS NOTES
2/17/2022     3:05 PM  CM received acceptance from [facility] for [facility] services. 2:54 PM  Care Management Progress Note      RUR:  NA - OBS  Risk Level: [x]Low []Moderate []High  Value-based purchasing: [] Yes [x] No  Bundle patient: [] Yes [x] No   Specify:     Transition of care plan:  1. Awaiting medical clearance and DC order. PT/OT treating. 2. Home with [facility] - CM consult for [facility] noted. CM met with pt who is agreeable, and indicated [facility] as preference. CM submitted referral via [employer], and is awaiting response. 3. Outpatient follow-up. 4. Pt's family to transport.

## 2022-02-18 VITALS
HEART RATE: 90 BPM | OXYGEN SATURATION: 95 % | SYSTOLIC BLOOD PRESSURE: 122 MMHG | DIASTOLIC BLOOD PRESSURE: 89 MMHG | RESPIRATION RATE: 18 BRPM | WEIGHT: 315 LBS | TEMPERATURE: 98.4 F | BODY MASS INDEX: 36.45 KG/M2 | HEIGHT: 78 IN

## 2022-02-18 LAB
ANION GAP SERPL CALC-SCNC: 4 MMOL/L (ref 5–15)
BUN SERPL-MCNC: 10 MG/DL (ref 6–20)
BUN/CREAT SERPL: 13 (ref 12–20)
CALCIUM SERPL-MCNC: 8.5 MG/DL (ref 8.5–10.1)
CHLORIDE SERPL-SCNC: 100 MMOL/L (ref 97–108)
CO2 SERPL-SCNC: 30 MMOL/L (ref 21–32)
CREAT SERPL-MCNC: 0.76 MG/DL (ref 0.7–1.3)
GLUCOSE SERPL-MCNC: 106 MG/DL (ref 65–100)
HGB BLD-MCNC: 10.7 G/DL (ref 12.1–17)
POTASSIUM SERPL-SCNC: 3.5 MMOL/L (ref 3.5–5.1)
SODIUM SERPL-SCNC: 134 MMOL/L (ref 136–145)

## 2022-02-18 PROCEDURE — 97116 GAIT TRAINING THERAPY: CPT

## 2022-02-18 PROCEDURE — 74011250637 HC RX REV CODE- 250/637: Performed by: ORTHOPAEDIC SURGERY

## 2022-02-18 PROCEDURE — 80048 BASIC METABOLIC PNL TOTAL CA: CPT

## 2022-02-18 PROCEDURE — 36415 COLL VENOUS BLD VENIPUNCTURE: CPT

## 2022-02-18 PROCEDURE — 74011000250 HC RX REV CODE- 250: Performed by: ORTHOPAEDIC SURGERY

## 2022-02-18 PROCEDURE — 85018 HEMOGLOBIN: CPT

## 2022-02-18 PROCEDURE — 94760 N-INVAS EAR/PLS OXIMETRY 1: CPT

## 2022-02-18 PROCEDURE — G0378 HOSPITAL OBSERVATION PER HR: HCPCS

## 2022-02-18 RX ORDER — DOCUSATE SODIUM 100 MG/1
100 CAPSULE, LIQUID FILLED ORAL 2 TIMES DAILY
Qty: 60 CAPSULE | Refills: 0 | Status: SHIPPED | OUTPATIENT
Start: 2022-02-18 | End: 2022-03-25

## 2022-02-18 RX ORDER — OXYCODONE HYDROCHLORIDE 5 MG/1
5-10 TABLET ORAL
Qty: 50 TABLET | Refills: 0 | Status: SHIPPED | OUTPATIENT
Start: 2022-02-18 | End: 2022-02-25

## 2022-02-18 RX ORDER — CYCLOBENZAPRINE HCL 10 MG
10 TABLET ORAL
Qty: 30 TABLET | Refills: 0 | Status: ON HOLD | OUTPATIENT
Start: 2022-02-18 | End: 2022-03-25 | Stop reason: SDUPTHER

## 2022-02-18 RX ADMIN — ACETAMINOPHEN 650 MG: 325 TABLET ORAL at 07:43

## 2022-02-18 RX ADMIN — TAMSULOSIN HYDROCHLORIDE 0.4 MG: 0.4 CAPSULE ORAL at 09:06

## 2022-02-18 RX ADMIN — HYDROCHLOROTHIAZIDE 12.5 MG: 25 TABLET ORAL at 09:06

## 2022-02-18 RX ADMIN — Medication 10 ML: at 12:06

## 2022-02-18 RX ADMIN — FAMOTIDINE 20 MG: 20 TABLET ORAL at 09:06

## 2022-02-18 RX ADMIN — POLYETHYLENE GLYCOL 3350 17 G: 17 POWDER, FOR SOLUTION ORAL at 09:06

## 2022-02-18 RX ADMIN — DOCUSATE SODIUM 50MG AND SENNOSIDES 8.6MG 1 TABLET: 8.6; 5 TABLET, FILM COATED ORAL at 09:06

## 2022-02-18 RX ADMIN — VALSARTAN 160 MG: 80 TABLET, FILM COATED ORAL at 09:06

## 2022-02-18 RX ADMIN — OXYCODONE 10 MG: 5 TABLET ORAL at 07:43

## 2022-02-18 RX ADMIN — Medication 10 ML: at 06:00

## 2022-02-18 RX ADMIN — OXYCODONE 10 MG: 5 TABLET ORAL at 15:15

## 2022-02-18 NOTE — PROGRESS NOTES
Problem: Falls - Risk of  Goal: *Absence of Falls  Description: Document Preston Mederos Fall Risk and appropriate interventions in the flowsheet.   2/18/2022 1601 by Sreekanth Del Toro  Outcome: Resolved/Met  Note: Fall Risk Interventions:  Mobility Interventions: Bed/chair exit alarm,Strengthening exercises (ROM-active/passive),Utilize walker, cane, or other assistive device         Medication Interventions: Bed/chair exit alarm,Patient to call before getting OOB,Teach patient to arise slowly    Elimination Interventions: Bed/chair exit alarm,Call light in reach,Toileting schedule/hourly rounds           2/18/2022 1144 by Sreekanth Del Toro  Outcome: Progressing Towards Goal  Note: Fall Risk Interventions:  Mobility Interventions: Bed/chair exit alarm,Strengthening exercises (ROM-active/passive),Utilize walker, cane, or other assistive device         Medication Interventions: Bed/chair exit alarm,Patient to call before getting OOB,Teach patient to arise slowly    Elimination Interventions: Bed/chair exit alarm,Call light in reach,Toileting schedule/hourly rounds              Problem: Patient Education: Go to Patient Education Activity  Goal: Patient/Family Education  2/18/2022 1601 by Sreekanth Del Toro  Outcome: Resolved/Met  2/18/2022 1144 by Sreekanth Del Toro  Outcome: Progressing Towards Goal

## 2022-02-18 NOTE — FACE TO FACE
Rounded on patient, f/u from Spine Pre-op Patient Education Class. Patient states class information was valuable in preparing for surgery. Patient states their home space is prepared and safe. Reviewed incentive spirometry use, encouraged 10x/hour   Call, don't fall expectations reviewed with patient  Opportunity provided for patient to ask questions. Questions answered.

## 2022-02-18 NOTE — PROGRESS NOTES
Problem: Mobility Impaired (Adult and Pediatric)  Goal: *Acute Goals and Plan of Care (Insert Text)  Description: FUNCTIONAL STATUS PRIOR TO ADMISSION: Patient was modified independent using a walking stick for functional mobility. HOME SUPPORT PRIOR TO ADMISSION: The patient lived alone with no local support. Pt does not give full answers for his home support. 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 Physical Therapy Goals  Initiated 2/16/2022    1. Patient will move from supine to sit and sit to supine  in bed with independence within 4 days. 2. Patient will perform sit to stand with modified independence within 4 days. 3. Patient will ambulate with modified independence for 200 feet with the least restrictive device within 4 days. 4. Patient will ascend/descend 3 stairs with 1 handrail(s) with minimal assistance/contact guard assist within 4 days. 5. Patient will verbalize and demonstrate understanding of spinal precautions (No bending, lifting greater than 5 lbs, or twisting; log-roll technique; frequent repositioning as instructed) within 4 days. Outcome: Progressing Towards Goal   PHYSICAL THERAPY TREATMENT  Patient: Valdemar Triana (70 y.o. male)  Date: 2/18/2022  Diagnosis: Spinal stenosis, lumbar region, with neurogenic claudication [M48.062]  Lumbar stenosis with neurogenic claudication [M48.062] <principal problem not specified>  Procedure(s) (LRB):  L2-L5 LAMINECTOMY (N/A) 3 Days Post-Op  Precautions: Fall,Spinal,Other (comment) (brace) No bending, no lifting greater than 5 lbs, no twisting, log-roll technique, repositioning every 20-30 min except when sleeping, brace when OOB (if ordered)  Chart, physical therapy assessment, plan of care and goals were reviewed. ASSESSMENT  Patient continues with skilled PT services and is progressing towards goals.  Patient this morning with good participation and tolerance to physical therapy session. Patient performs log roll to EOB with supervision level assist and improved breathing and timing during transfer. He requires no verbal cuing during session to maintain back precautions. Patient ambulates 250ft through halls with Supervision level assist with RW. Patient with no complaints of pain during session. Recommend HHPT upon d/c. Patient has already cleared therapy at yesterday's PM session. Current Level of Function Impacting Discharge (mobility/balance): supervision/ Lawanda    Other factors to consider for discharge: none additional         PLAN :  Patient continues to benefit from skilled intervention to address the above impairments. Continue treatment per established plan of care. to address goals. Recommendation for discharge: (in order for the patient to meet his/her long term goals)  Physical therapy at least 2 days/week in the home     This discharge recommendation:  Has been made in collaboration with the attending provider and/or case management    IF patient discharges home will need the following DME: bariatric RW has been delivered for discharge       SUBJECTIVE:   Patient stated Viviana Lied you I hope you have a nice weekend.  re: at conclusion of session    OBJECTIVE DATA SUMMARY:   Patient received supine in bed and was agreeable to participate in PT session. Patient was cleared by nursing to participate in PT session. Critical Behavior:  Neurologic State: Alert  Orientation Level: Oriented X4  Cognition: Follows commands  Safety/Judgement: Awareness of environment,Fall prevention,Home safety,Insight into deficits  Noted gray/yellow drainage to patient surgical bandage. Alerted ortho PA who observed. Spinal diagnosis intervention:  The patient stated 3/3 back precautions when prompted. Reviewed all 3 back precautions, log roll technique, and sitting for 30 minutes at a time.  The patient required no verbal cues to maintain back precautions during functional activity. Reviewed back brace application and wear schedule. Brace donned with independence      Functional Mobility Training:    Bed Mobility:  Log Rolling: Independent  Supine to Sit: Supervision     Scooting: Independent        Transfers:  Sit to Stand: Supervision  Stand to Sit: Supervision        Bed to Chair: Supervision                    Balance:  Sitting: Intact; Without support  Standing: Intact; With support  Ambulation/Gait Training:  Distance (ft): 250 Feet (ft)  Assistive Device: Brace/Splint;Gait belt;Walker, rolling (bariatric RW)  Ambulation - Level of Assistance: Supervision                       Speed/Ginny: Pace decreased (<100 feet/min)                  Pain Rating:  Patient without reports of pain during therapy      Activity Tolerance:   Good and tolerates ADLs without rest breaks    After treatment patient left in no apparent distress:   Sitting in chair, Call bell within reach, and Bed / chair alarm activated    COMMUNICATION/COLLABORATION:   The patients plan of care was discussed with: Occupational therapy assistant, Registered nurse, Case management, and ortho PA .      Galileo Shannon PT, DPT   Time Calculation: 16 mins

## 2022-02-18 NOTE — PROGRESS NOTES
Ortho Spine F/u      Pain controlled. Medically stable. Cleared by therapy. Discussed plan of care and answered all questions. Will recheck drain output at noon. If that is less than 30cc, will d/c home.       Ehsan Doll NP

## 2022-02-18 NOTE — PROGRESS NOTES
2/18/2022  1:51 PM  Care Management Progress Note      ICD-10-CM ICD-9-CM    1. Acute post-operative pain  G89.18 338.18 oxyCODONE IR (ROXICODONE) 5 mg immediate release tablet       RUR:  NA - OBS  Risk Level: [x]Low []Moderate []High  Value-based purchasing: [] Yes [x] No  Bundle patient: [] Yes [x] No   Specify:     Transition of care plan:  1. Discharge order submitted. Pt has medically cleared. 2. Home with [facility] with [facility], and DC clinicals attached in [employer]. 3. Outpatient follow-up. 4. Pt's family to transport. Care Management Interventions  PCP Verified by CM: Yes ([place])  Mode of Transport at Discharge:  Other (see comment)  MyChart Signup: No  Discharge Durable Medical Equipment: No  Physical Therapy Consult: Yes  Occupational Therapy Consult: Yes  Speech Therapy Consult: No  Support Systems: Spouse/Significant Other  Confirm Follow Up Transport: Family  Discharge Location  Patient Expects to be Discharged to[de-identified] Home with home health

## 2022-02-18 NOTE — PROGRESS NOTES
Occupational Therapy Note; Pt stated he has no further questions/concerns for OT at this time. He has ordered a hip kit for home. Plan is for pt to return home once medically clear.

## 2022-02-18 NOTE — DISCHARGE INSTRUCTIONS
Lumbar Spinal Surgery Discharge Instructions   Dr. Jailyn Bingham  [phone]    Activity:    Selam Roberto You are going home a well person, be as active as possible. Your only exercise should be walking. Start with short frequent walks and increase your walking distance each day. Start with walking twice a day for 5 minutes and increase your distance each day 2-3 minutes until you reach 25 minutes twice a day. Limit the amount of time you sit to 20-30 minute intervals. Sitting for prolonged periods of time will be uncomfortable for you following your surgery.  No bending, lifting (of 5lbs or more), twisting, or straining.  Do not drive until your doctor states you may do so. However, you may ride in a car for short distances.  If you are required to wear a brace, you should wear it at all times when you are out of bed. You may remove it when sleeping unless your physician advises you against it.  When you are in the bed, you may lay on your back or on either side. Do not lie on your stomach.  You may resume sexual relations 3-4 weeks after surgery depending on how you are feeling.  Continue to use your incentive spirometer for deep breathing exercises. Driving:   You may not drive or return to work until instructed by your physician. However, you may ride in the car for short periods of time. Brace:   If you have a back brace, you should wear your brace at all times when you are out of bed. Do not wear the brace while in bed or showering.  Remember to always wear a cotton t-shirt underneath your brace.  Do not bend or twist when your brace is off. Diet:   You may resume your regular home diet as tolerated. If your throat is sore, you should eat soft foods for the first couple of days.  Be sure to drink plenty of fluids; it is important to keep yourself hydrated.  Avoid alcoholic beverages and ABSOLUTELY NO tobacco products.  Tobacco products will interfere with your healing. If you continue to use tobacco, you may end up needing another surgery in the future. Dressing: You have on a Prineo dressing. This is a waterproof bacteriostatic dressing that  requires no post-operative care. Please do not peel the dressing off, or apply any  oil based products, as they may expedite the deterioration of the mesh. The  dressing will slowly chip off on its own.  Do not rub or apply lotion or ointments to incision site. Shower:   You may shower 5 days after your surgery.  You may remove your brace during showers.  NO not use tub baths, swimming pools or Jacuzzis. Medications:  **Your prescriptions have been e-scribed to the pharmacy on file at Dr. Andriy You office. **   Check with your physician before taking any anti-inflammatory medications. (Advil, Aleve, Ibuprofen, Aspirin)   Take your pain medication as directed   Do NOT take additional Tylenol if your prescribed pain medication has acetaminophen in it (Endocet/Percocet, Lortab, Norco)   It is important to have regular bowel movements. Pain medications can be constipating. Stool softeners, warm prune juice, increasing your water intake, and increasing fiber in your diet can help in preventing constipation.  Do NOT take laxatives if at all possible except in severe situations. It can result in a vicious cycle of constipation and diarrhea. *** VERY IMPORTANT - PLEASE READ*** Please monitor the supply of your pain medicine closely and if it looks like you're going to run out of pain medicine over the weekend please call the office on [address] prior to the weekend to request a refill. The on call physician for nights and weekends CANNOT refill pain medicine. You will either have to wait until Monday morning when the office re-opens or you will have to go to an urgent care/ emergency room if you are in need of pain medicine.     Follow-Up    Please call ASAP to schedule your 1st post-operative appointment. This should be approximately 3 weeks from your surgery date. Notify your physician in you develop any of the following conditions:   Fever above 101 degrees for 24 hours.  Nausea or vomiting.  Severe headache.  Inability to urinate   Loss of bowel or bladder function (sudden onset of incontinence)   Changes in sensation in your extremities (numbness, tingling, loss of color).  Severe pain or pain not relieved by medications.  Redness, swelling, or drainage from your incision.  Persistent pain in the chest.    Pain in the calf of either leg.  Increased weakness (if this is greater than before your surgery). If you have any questions about your dressing contact your Orthopaedic Surgeons office. OFFICE OF DR. Mickie Garcia   [phone]  OUR NEW ADDRESS IS [address], [facility] [address] [facility]     ** IMPORTANT: UNDER YOUR HONEYCOMB DRESSING, YOU HAVE A PRINEO ADHESIVE MESH DIRECTLY OVER YOUR INCISION. THIS MESH WAS STERILELY GLUED TO YOUR SKIN DURING SURGERY. DO NOT REMOVE THIS. NO ONE ELSE SHOULD REMOVE IT EITHER. IT WILL COME OFF ON ITS OWN OVER TIME    YOUR HONEYCOMB DRESSING NEEDS TO BE REMOVED PRIOR TO SHOWERING. THEN THE PRINEO MESH CAN BE LEFT OPEN TO AIR    * WEAR YOUR BRACE AS ADVISED    * NO DRIVING UNTIL YOU ARE CLEARED TO DO SO BY YOUR SURGEON    * LIMIT LIFTING, BENDING AND TWISTING.  NO LIFTING MORE THAN 5 LBS    * MAKE SURE YOU ARE GETTING GOOD NUTRITION (Lean Protein, Vitamin D AND Calcium)    * DO NOT TAKE ANY NSAIDs FOR THE FIRST 3 MONTHS AFTER SURGERY (such as Advil/Ibuprofen/Motrin, Aleve/Naproxen/Naprosyn, Diclofenac, Celebrex, Meloxicam, Indomethacin, Goody's powder, BC powder etc.)    * NO NICOTINE PRODUCTS    * FULLY READ YOUR DISCHARGE INSTRUCTIONS

## 2022-02-18 NOTE — PROGRESS NOTES
Bedside shift change report given to [facility] (oncoming nurse) by Vanesa Reynolds (offgoing nurse). Report included Admission/ Assessment information, to include medications that patient is/ has been taking, labs drawn, abnormal vitals, and current orders. Patient complained of pain of 8/10 while in report, so I offered him is medication prior to leaving for the day. Oncoming nurse will do reassessment of pain level.

## 2022-02-18 NOTE — PROGRESS NOTES
Problem: Falls - Risk of  Goal: *Absence of Falls  Description: Document Dee Embs Fall Risk and appropriate interventions in the flowsheet.   Outcome: Progressing Towards Goal  Note: Fall Risk Interventions:  Mobility Interventions: Bed/chair exit alarm,Strengthening exercises (ROM-active/passive),Utilize walker, cane, or other assistive device         Medication Interventions: Bed/chair exit alarm,Patient to call before getting OOB,Teach patient to arise slowly    Elimination Interventions: Bed/chair exit alarm,Call light in reach,Toileting schedule/hourly rounds              Problem: Patient Education: Go to Patient Education Activity  Goal: Patient/Family Education  Outcome: Progressing Towards Goal 2022 15:55

## 2022-02-18 NOTE — PROGRESS NOTES
ORTHOPAEDIC LUMBAR FUSION PROGRESS NOTE    NAME:     Erica Davies. :       1950   MRN:       [id number]   DATE:      2022    POD:    3 Days Post-Op  S/P:    Procedure(s):  L2-L5 LAMINECTOMY    SUBJECTIVE:    C/O back pain along surgical incision  No leg pain or numbness  Denies nausea/vomiting, headache, chest pain or shortness of breath  Pain controlled    Recent Labs     22  0208   HGB 10.7*   *   K 3.5      CO2 30   BUN 10   CREA 0.76   *     Patient Vitals for the past 12 hrs:   BP Temp Pulse Resp SpO2   22 0757 116/60 98.8 °F (37.1 °C) 74 18 92 %   22 0247 113/67 97.9 °F (36.6 °C) 72 18 95 %   22 2334 118/70 98.1 °F (36.7 °C) 77 18 95 %       EXAM:  Serous drainage on dressing. Dressing intact   Positive strength/ROM bilat lower ext.   Neuro intact to sensation  Calves, soft & nontender  BL LEs NVID      PLAN:  Change dressing  Continue prn PO pain medications  PT/OT, OOB w/ assist  Tolerating diet      Valentino Carolina, [place]  Orthopaedic Surgery  Physician Assistant to Dr. Juanjose Helm

## 2022-02-21 NOTE — DISCHARGE SUMMARY
DISCHARGE SUMMARY     Patient: Sonny Serna Medical Record Number: [id number]                : 1950  Age: 80 y.o. Admit Date: 2/15/2022  Discharge Date: 2022  Admission Diagnosis: Spinal stenosis, lumbar region, with neurogenic claudication [M48.062]  Lumbar stenosis with neurogenic claudication [M48.062]  Discharge Diagnosis: Spinal stenosis, lumbar region, with neurogenic claudication [M48.062]  Procedures: Procedure(s):  L2-L5 LAMINECTOMY  Surgeon: Karson Gayle MD  Co-surgeon:   Assistants:   Anesthesia: General  Complications: None     History of Present Illness:  Sonny Serna is a 80 y.o. male with a history of intractable low back and radiculopathy. Despite conservative management and after clinical and radiographic evaluation, it was determined that he suffered from lumbar stenosis  and lumbar spondylolisthesis and would benefit from Procedure(s):  L2-L5 LAMINECTOMY, which he consented to undergo after a discussion of the risks, benefits, alternatives, rehab concerns, and potential complications of surgery. Hospital Course:  Sonny Serna tolerated the procedure well. He was transferred  to the recovery room in stable condition. After a brief stay, the patient was then transferred to the Orthopedic floor. On postoperative day #1, the dressing was clean and dry and he was neurovascularly intact. The patient was afebrile and vital signs were stable. Calves were soft and non-tender bilaterally. Sonny Serna made excellent progress with physical therapy and was discharged to Home with home health in stable condition on postoperative day 3. He was provided with routine postoperative instructions and advised to follow up in my office in 3 weeks following discharge from the hospital.  He was given prescriptions for medication to control post-operative symptoms.     Discharge Medications:  Discharge Medication List as of 2022  1:26 PM START taking these medications    Details   oxyCODONE IR (ROXICODONE) 5 mg immediate release tablet Take 1-2 Tablets by mouth every six (6) hours as needed for Pain for up to 7 days. Max Daily Amount: 40 mg. Indications: pain, Acute Post op Pain, Normal, Disp-50 Tablet, R-0S/p Orthopaedic Surgery. Call [phone] with questions. cyclobenzaprine (FLEXERIL) 10 mg tablet Take 1 Tablet by mouth three (3) times daily as needed for Muscle Spasm(s). , Normal, Disp-30 Tablet, R-0Call [phone] with questions      docusate sodium (COLACE) 100 mg capsule Take 1 Capsule by mouth two (2) times a day., Normal, Disp-60 Capsule, R-0         CONTINUE these medications which have NOT CHANGED    Details   valsartan-hydroCHLOROthiazide (DIOVAN-HCT) 160-12.5 mg per tablet TAKE 1 TABLET BY MOUTH EVERY DAY, Normal, Disp-90 Tablet, R-3      cyanocobalamin, vitamin B-12, (VITAMIN B-12 PO) Take 2,500 mcg by mouth daily. , Historical Med      cetirizine (ZyrTEC) 10 mg tablet Take 10 mg by mouth as needed for Allergies. , Historical Med      furosemide (LASIX) 20 mg tablet TAKE 1 TABLET BY MOUTH DAILY AS NEEDED (FOR SWELLING (EDEMA). TAKE FOR 3 DAYS MAX). , Normal, Disp-90 Tablet, R-1      tamsulosin (FLOMAX) 0.4 mg capsule Take 0.4 mg by mouth daily. , Historical Med      folic acid (FOLVITE) 1 mg tablet TAKE 1 TABLET BY MOUTH EVERY DAY, Historical Med      augmented betamethasone dipropionate (DIPROLENE-AF) 0.05 % ointment Apply  to affected area as needed for Skin Irritation. , Historical Med         STOP taking these medications       zolpidem (AMBIEN) 10 mg tablet Comments:   Reason for Stopping:         HYDROcodone-acetaminophen (NORCO) 7.5-325 mg per tablet Comments:   Reason for Stopping:         METHOTREXATE, PF, SC Comments:   Reason for Stopping:         aspirin delayed-release 81 mg tablet Comments:   Reason for Stopping:         INFLIXIMAB (REMICADE IV) Comments:   Reason for Stopping:         diclofenac EC (VOLTAREN) 75 mg EC tablet Comments:   Reason for Stopping:               Hope Braxton  2/18/2022  Orthopaedic Surgery  Physician Assistant to Dr. Karson Gayle

## 2022-02-21 NOTE — Clinical Note
Sheath #1: Sheath: inserted. Sheath inserted/placed in the right radial  artery. Length To Time In Minutes Device Was In Place: 10

## 2022-03-18 PROBLEM — E66.01 OBESITY, MORBID (HCC): Status: ACTIVE | Noted: 2020-08-31

## 2022-03-19 PROBLEM — M48.062 LUMBAR STENOSIS WITH NEUROGENIC CLAUDICATION: Status: ACTIVE | Noted: 2022-02-15

## 2022-03-20 ENCOUNTER — HOSPITAL ENCOUNTER (INPATIENT)
Age: 72
LOS: 5 days | Discharge: HOME HEALTH | DRG: 863 | End: 2022-03-25
Attending: EMERGENCY MEDICINE | Admitting: HOSPITALIST
Payer: MEDICARE

## 2022-03-20 ENCOUNTER — APPOINTMENT (OUTPATIENT)
Dept: CT IMAGING | Age: 72
DRG: 863 | End: 2022-03-20
Attending: EMERGENCY MEDICINE
Payer: MEDICARE

## 2022-03-20 ENCOUNTER — APPOINTMENT (OUTPATIENT)
Dept: GENERAL RADIOLOGY | Age: 72
DRG: 863 | End: 2022-03-20
Attending: PHYSICIAN ASSISTANT
Payer: MEDICARE

## 2022-03-20 DIAGNOSIS — T81.49XA WOUND CELLULITIS AFTER SURGERY: Primary | ICD-10-CM

## 2022-03-20 DIAGNOSIS — Z98.890 STATUS POST LUMBAR LAMINECTOMY: ICD-10-CM

## 2022-03-20 DIAGNOSIS — T81.49XA SURGICAL WOUND INFECTION: ICD-10-CM

## 2022-03-20 LAB
ALBUMIN SERPL-MCNC: 2.6 G/DL (ref 3.5–5)
ALBUMIN/GLOB SERPL: 0.5 {RATIO} (ref 1.1–2.2)
ALP SERPL-CCNC: 75 U/L (ref 45–117)
ALT SERPL-CCNC: 22 U/L (ref 12–78)
ANION GAP SERPL CALC-SCNC: 5 MMOL/L (ref 5–15)
AST SERPL-CCNC: 17 U/L (ref 15–37)
BASOPHILS # BLD: 0.1 K/UL (ref 0–0.1)
BASOPHILS NFR BLD: 0 % (ref 0–1)
BILIRUB SERPL-MCNC: 0.7 MG/DL (ref 0.2–1)
BUN SERPL-MCNC: 15 MG/DL (ref 6–20)
BUN/CREAT SERPL: 16 (ref 12–20)
CALCIUM SERPL-MCNC: 9.2 MG/DL (ref 8.5–10.1)
CHLORIDE SERPL-SCNC: 98 MMOL/L (ref 97–108)
CO2 SERPL-SCNC: 29 MMOL/L (ref 21–32)
COVID-19 RAPID TEST, COVR: NOT DETECTED
CREAT SERPL-MCNC: 0.96 MG/DL (ref 0.7–1.3)
CRP SERPL-MCNC: 23.7 MG/DL (ref 0–0.6)
DIFFERENTIAL METHOD BLD: ABNORMAL
EOSINOPHIL # BLD: 0.1 K/UL (ref 0–0.4)
EOSINOPHIL NFR BLD: 1 % (ref 0–7)
ERYTHROCYTE [DISTWIDTH] IN BLOOD BY AUTOMATED COUNT: 14.4 % (ref 11.5–14.5)
ERYTHROCYTE [SEDIMENTATION RATE] IN BLOOD: 69 MM/HR (ref 0–20)
GLOBULIN SER CALC-MCNC: 5.2 G/DL (ref 2–4)
GLUCOSE SERPL-MCNC: 89 MG/DL (ref 65–100)
HCT VFR BLD AUTO: 38.5 % (ref 36.6–50.3)
HGB BLD-MCNC: 12 G/DL (ref 12.1–17)
IMM GRANULOCYTES # BLD AUTO: 0.2 K/UL (ref 0–0.04)
IMM GRANULOCYTES NFR BLD AUTO: 1 % (ref 0–0.5)
LYMPHOCYTES # BLD: 2.3 K/UL (ref 0.8–3.5)
LYMPHOCYTES NFR BLD: 17 % (ref 12–49)
MCH RBC QN AUTO: 25.6 PG (ref 26–34)
MCHC RBC AUTO-ENTMCNC: 31.2 G/DL (ref 30–36.5)
MCV RBC AUTO: 82.1 FL (ref 80–99)
MONOCYTES # BLD: 1.8 K/UL (ref 0–1)
MONOCYTES NFR BLD: 13 % (ref 5–13)
NEUTS SEG # BLD: 9.1 K/UL (ref 1.8–8)
NEUTS SEG NFR BLD: 68 % (ref 32–75)
NRBC # BLD: 0 K/UL (ref 0–0.01)
NRBC BLD-RTO: 0 PER 100 WBC
PLATELET # BLD AUTO: 273 K/UL (ref 150–400)
PMV BLD AUTO: 9.5 FL (ref 8.9–12.9)
POTASSIUM SERPL-SCNC: 4.2 MMOL/L (ref 3.5–5.1)
PROCALCITONIN SERPL-MCNC: 0.26 NG/ML
PROT SERPL-MCNC: 7.8 G/DL (ref 6.4–8.2)
RBC # BLD AUTO: 4.69 M/UL (ref 4.1–5.7)
SODIUM SERPL-SCNC: 132 MMOL/L (ref 136–145)
SOURCE, COVRS: NORMAL
WBC # BLD AUTO: 13.5 K/UL (ref 4.1–11.1)

## 2022-03-20 PROCEDURE — 99285 EMERGENCY DEPT VISIT HI MDM: CPT

## 2022-03-20 PROCEDURE — 86140 C-REACTIVE PROTEIN: CPT

## 2022-03-20 PROCEDURE — 74011250636 HC RX REV CODE- 250/636: Performed by: HOSPITALIST

## 2022-03-20 PROCEDURE — 87040 BLOOD CULTURE FOR BACTERIA: CPT

## 2022-03-20 PROCEDURE — 87635 SARS-COV-2 COVID-19 AMP PRB: CPT

## 2022-03-20 PROCEDURE — 96375 TX/PRO/DX INJ NEW DRUG ADDON: CPT

## 2022-03-20 PROCEDURE — 71045 X-RAY EXAM CHEST 1 VIEW: CPT

## 2022-03-20 PROCEDURE — 74011000258 HC RX REV CODE- 258: Performed by: HOSPITALIST

## 2022-03-20 PROCEDURE — 87205 SMEAR GRAM STAIN: CPT

## 2022-03-20 PROCEDURE — 74011000636 HC RX REV CODE- 636: Performed by: RADIOLOGY

## 2022-03-20 PROCEDURE — 84145 PROCALCITONIN (PCT): CPT

## 2022-03-20 PROCEDURE — 74011250636 HC RX REV CODE- 250/636: Performed by: INTERNAL MEDICINE

## 2022-03-20 PROCEDURE — 87077 CULTURE AEROBIC IDENTIFY: CPT

## 2022-03-20 PROCEDURE — 85652 RBC SED RATE AUTOMATED: CPT

## 2022-03-20 PROCEDURE — 74011000250 HC RX REV CODE- 250: Performed by: HOSPITALIST

## 2022-03-20 PROCEDURE — 74011250637 HC RX REV CODE- 250/637: Performed by: INTERNAL MEDICINE

## 2022-03-20 PROCEDURE — 36415 COLL VENOUS BLD VENIPUNCTURE: CPT

## 2022-03-20 PROCEDURE — 85025 COMPLETE CBC W/AUTO DIFF WBC: CPT

## 2022-03-20 PROCEDURE — 80053 COMPREHEN METABOLIC PANEL: CPT

## 2022-03-20 PROCEDURE — 87147 CULTURE TYPE IMMUNOLOGIC: CPT

## 2022-03-20 PROCEDURE — 74011250637 HC RX REV CODE- 250/637: Performed by: EMERGENCY MEDICINE

## 2022-03-20 PROCEDURE — 96374 THER/PROPH/DIAG INJ IV PUSH: CPT

## 2022-03-20 PROCEDURE — 72132 CT LUMBAR SPINE W/DYE: CPT

## 2022-03-20 PROCEDURE — 87075 CULTR BACTERIA EXCEPT BLOOD: CPT

## 2022-03-20 PROCEDURE — 74011250637 HC RX REV CODE- 250/637: Performed by: HOSPITALIST

## 2022-03-20 PROCEDURE — 74011250636 HC RX REV CODE- 250/636: Performed by: EMERGENCY MEDICINE

## 2022-03-20 PROCEDURE — 87186 SC STD MICRODIL/AGAR DIL: CPT

## 2022-03-20 PROCEDURE — 65270000029 HC RM PRIVATE

## 2022-03-20 RX ORDER — OXYCODONE AND ACETAMINOPHEN 5; 325 MG/1; MG/1
1 TABLET ORAL
Status: DISCONTINUED | OUTPATIENT
Start: 2022-03-20 | End: 2022-03-22

## 2022-03-20 RX ORDER — CLINDAMYCIN PHOSPHATE 600 MG/50ML
600 INJECTION, SOLUTION INTRAVENOUS
Status: DISCONTINUED | OUTPATIENT
Start: 2022-03-20 | End: 2022-03-20

## 2022-03-20 RX ORDER — TAMSULOSIN HYDROCHLORIDE 0.4 MG/1
0.4 CAPSULE ORAL DAILY
Status: DISCONTINUED | OUTPATIENT
Start: 2022-03-21 | End: 2022-03-25 | Stop reason: HOSPADM

## 2022-03-20 RX ORDER — MORPHINE SULFATE 2 MG/ML
2 INJECTION, SOLUTION INTRAMUSCULAR; INTRAVENOUS
Status: DISCONTINUED | OUTPATIENT
Start: 2022-03-20 | End: 2022-03-21

## 2022-03-20 RX ORDER — ACETAMINOPHEN 650 MG/1
650 SUPPOSITORY RECTAL
Status: DISCONTINUED | OUTPATIENT
Start: 2022-03-20 | End: 2022-03-25 | Stop reason: HOSPADM

## 2022-03-20 RX ORDER — ACETAMINOPHEN 325 MG/1
650 TABLET ORAL
Status: DISCONTINUED | OUTPATIENT
Start: 2022-03-20 | End: 2022-03-25 | Stop reason: HOSPADM

## 2022-03-20 RX ORDER — CYCLOBENZAPRINE HCL 10 MG
10 TABLET ORAL
Status: DISCONTINUED | OUTPATIENT
Start: 2022-03-20 | End: 2022-03-25 | Stop reason: HOSPADM

## 2022-03-20 RX ORDER — KETOROLAC TROMETHAMINE 30 MG/ML
15 INJECTION, SOLUTION INTRAMUSCULAR; INTRAVENOUS
Status: COMPLETED | OUTPATIENT
Start: 2022-03-20 | End: 2022-03-20

## 2022-03-20 RX ORDER — FOLIC ACID 1 MG/1
1 TABLET ORAL DAILY
Status: DISCONTINUED | OUTPATIENT
Start: 2022-03-21 | End: 2022-03-25 | Stop reason: HOSPADM

## 2022-03-20 RX ORDER — LANOLIN ALCOHOL/MO/W.PET/CERES
3 CREAM (GRAM) TOPICAL
Status: DISCONTINUED | OUTPATIENT
Start: 2022-03-20 | End: 2022-03-25 | Stop reason: HOSPADM

## 2022-03-20 RX ORDER — ONDANSETRON 2 MG/ML
4 INJECTION INTRAMUSCULAR; INTRAVENOUS
Status: DISCONTINUED | OUTPATIENT
Start: 2022-03-20 | End: 2022-03-25 | Stop reason: HOSPADM

## 2022-03-20 RX ORDER — SODIUM CHLORIDE 9 MG/ML
75 INJECTION, SOLUTION INTRAVENOUS CONTINUOUS
Status: DISCONTINUED | OUTPATIENT
Start: 2022-03-20 | End: 2022-03-22

## 2022-03-20 RX ORDER — ENOXAPARIN SODIUM 100 MG/ML
60 INJECTION SUBCUTANEOUS EVERY 12 HOURS
Status: DISCONTINUED | OUTPATIENT
Start: 2022-03-21 | End: 2022-03-25 | Stop reason: HOSPADM

## 2022-03-20 RX ORDER — SODIUM CHLORIDE 0.9 % (FLUSH) 0.9 %
5-40 SYRINGE (ML) INJECTION EVERY 8 HOURS
Status: DISCONTINUED | OUTPATIENT
Start: 2022-03-20 | End: 2022-03-25 | Stop reason: HOSPADM

## 2022-03-20 RX ORDER — FENTANYL CITRATE 50 UG/ML
50 INJECTION, SOLUTION INTRAMUSCULAR; INTRAVENOUS
Status: COMPLETED | OUTPATIENT
Start: 2022-03-20 | End: 2022-03-20

## 2022-03-20 RX ORDER — DOCUSATE SODIUM 100 MG/1
100 CAPSULE, LIQUID FILLED ORAL 2 TIMES DAILY
Status: DISCONTINUED | OUTPATIENT
Start: 2022-03-20 | End: 2022-03-22

## 2022-03-20 RX ORDER — ONDANSETRON 4 MG/1
4 TABLET, ORALLY DISINTEGRATING ORAL
Status: DISCONTINUED | OUTPATIENT
Start: 2022-03-20 | End: 2022-03-25 | Stop reason: HOSPADM

## 2022-03-20 RX ORDER — POLYETHYLENE GLYCOL 3350 17 G/17G
17 POWDER, FOR SOLUTION ORAL DAILY PRN
Status: DISCONTINUED | OUTPATIENT
Start: 2022-03-20 | End: 2022-03-22

## 2022-03-20 RX ORDER — SODIUM CHLORIDE 0.9 % (FLUSH) 0.9 %
5-40 SYRINGE (ML) INJECTION AS NEEDED
Status: DISCONTINUED | OUTPATIENT
Start: 2022-03-20 | End: 2022-03-25 | Stop reason: HOSPADM

## 2022-03-20 RX ORDER — OXYCODONE AND ACETAMINOPHEN 5; 325 MG/1; MG/1
1 TABLET ORAL
Status: COMPLETED | OUTPATIENT
Start: 2022-03-20 | End: 2022-03-20

## 2022-03-20 RX ADMIN — Medication 3 MG: at 23:50

## 2022-03-20 RX ADMIN — MORPHINE SULFATE 2 MG: 2 INJECTION, SOLUTION INTRAMUSCULAR; INTRAVENOUS at 20:37

## 2022-03-20 RX ADMIN — VANCOMYCIN HYDROCHLORIDE 2500 MG: 10 INJECTION, POWDER, LYOPHILIZED, FOR SOLUTION INTRAVENOUS at 19:25

## 2022-03-20 RX ADMIN — PIPERACILLIN AND TAZOBACTAM 3.38 G: 3; .375 INJECTION, POWDER, LYOPHILIZED, FOR SOLUTION INTRAVENOUS at 18:50

## 2022-03-20 RX ADMIN — Medication 10 ML: at 22:00

## 2022-03-20 RX ADMIN — SODIUM CHLORIDE 75 ML/HR: 9 INJECTION, SOLUTION INTRAVENOUS at 18:42

## 2022-03-20 RX ADMIN — OXYCODONE AND ACETAMINOPHEN 1 TABLET: 5; 325 TABLET ORAL at 14:30

## 2022-03-20 RX ADMIN — DOCUSATE SODIUM 100 MG: 100 CAPSULE ORAL at 19:59

## 2022-03-20 RX ADMIN — OXYCODONE AND ACETAMINOPHEN 1 TABLET: 5; 325 TABLET ORAL at 23:50

## 2022-03-20 RX ADMIN — CYCLOBENZAPRINE 10 MG: 10 TABLET, FILM COATED ORAL at 20:12

## 2022-03-20 RX ADMIN — KETOROLAC TROMETHAMINE 15 MG: 30 INJECTION, SOLUTION INTRAMUSCULAR at 16:50

## 2022-03-20 RX ADMIN — IOPAMIDOL 100 ML: 755 INJECTION, SOLUTION INTRAVENOUS at 16:40

## 2022-03-20 RX ADMIN — SODIUM CHLORIDE 1000 ML: 9 INJECTION, SOLUTION INTRAVENOUS at 17:41

## 2022-03-20 RX ADMIN — FENTANYL CITRATE 50 MCG: 50 INJECTION INTRAMUSCULAR; INTRAVENOUS at 16:47

## 2022-03-20 NOTE — PROGRESS NOTES
Patient seen and examined- patient with a 1 week draining incision. Patient reports increased pain and drainage starting on March 14. He reports increased temperature at home. He came to [facility] via EMS. He has a partial wound dehiscience distally with purulent drainage. Wound was cultured. Plan for lumbar I and D tomorrow with Dr. Dylan Walker. NPO after midnight.               Yessenia Marrufo PA-C  Orthopaedic Surgery PA  [address] [facility]

## 2022-03-20 NOTE — ED PROVIDER NOTES
Please note that this dictation was completed with [employer], the computer voice recognition software.  Quite often unanticipated grammatical, syntax, homophones, and other interpretive errors are inadvertently transcribed by the computer software.  Please disregard these errors.  Please excuse any errors that have escaped final proofreading. 67-year-old male past medical history markable for abnormal stress test, ankylosing spondylitis, hypertension, BPH, chronic edema, colonic polyps, vaccinated against COVID-19, degenerative disc disease of the C-spine, dyspnea on exertion, insomnia, JUDY on CPAP, shortness of breath,\" middle of February had lumbar laminectomy with Dr. Cat Fang. \"  Patient states he had been doing great followed up last week with Dr. Imelda Garcia PA was told \"I could restart my methotrexate for my rheumatoid arthritis this Monday. Drove myself to the pharmacy on Monday was walking around everywhere was doing great since that time I have had increased pain about my back and myalgias. That night he started to have chills and just not feeling well. The next night I felt like I went from being cold to be in warm, and have also noticed at night\" soaking through the sheets. \"  Patient states he has been taking his over-the-counter medications as prescribed. He has not recontacted Dr. Imelda Garcia office came to the ER today for further evaluation of increased back pain and generalized malaise aches questionable fevers chills. Patient denies any problems with urination has had normal bladder bowel habits. Denies any headaches vision changes numbness tingling. Patient states she did put a patch himself over his spinal wound but has not noticed any discharge from the wound. He said there was a spot on the bandage the other day. He does said there is a little bit of pain \"just to the right of the wound. \"    pt denies HA, vison changes, diff swallowing, CP, SOB, Abd pain, N/V, D/Cons or other current systemic complaints    Social/ PSH reviewed in EMR    EMR Chart Reviewed           Past Medical History:   Diagnosis Date    Abnormal stress test 12/2021    Cardiac cath done and normal 1/13/22    Ankylosing spondylitis ([address] 1977    Dr. Griffin Tyson.  neck, lumbar. sees pain management    Benign essential HTN     BPH (benign prostatic hyperplasia)     Dr. Idella Boas. PSA 0.3 4/15/21, 0.6 5/1/20    Chronic edema     compression stockings    Colon polyp     hx.  last colonoscopy 2019.  COVID-19 vaccine series completed     DJD (degenerative joint disease) of cervical spine     Dr. Jennifer Mosley (dyspnea on exertion)     History of basal cell cancer 2013    face- Dr. Cande Archuleta. MOHS    History of bilateral knee replacement     Dr. Carlitos Richardson    History of DVT (deep vein thrombosis)     left leg.  Insomnia     takes ambien per Dr. Linda Irwin Kidney stone     Lumbar spinal stenosis 2011    Dr Rebekah Barnes, Dr. Percell Boas. MRI 9/2021, 9/2017. hx of injections, nerve ablation    Neuropathy     feet. due to spinal dz?  saw neurology. hx GTT    JUDY on CPAP     Dr. Ricky Buck    Psoriasis     Rheumatoid arthritis of cervical spine (HCC)     Limited ROM    SOB (shortness of breath) 2014    Dr. Subha Sue abnormal stress. cath 2014 per pt       Past Surgical History:   Procedure Laterality Date    HX COLONOSCOPY  10/15/2019    Dr. [email or web address]. normal (report received). repeat 5 years    HX COLONOSCOPY      hx polyps    HX HEART CATHETERIZATION  2014    Dr Restrepo Median  01/03/2022    Dr. lEoina Thorne [place] Bilateral     HX LITHOTRIPSY      HX [address] PROCEDURES  2013?     facial BCC    HX SHOULDER ARTHROSCOPY Left     HX WRIST FRACTURE TX Right          Family History:   Problem Relation Age of Onset    Heart Disease Father     OSTEOARTHRITIS Father     OSTEOARTHRITIS Sister        Social History     Socioeconomic History    Marital status: SINGLE     Spouse name: never     Number of children: 0    Years of education: Not on file    Highest education level: Not on file   Occupational History     Employer: RETIRED   Tobacco Use    Smoking status: Former Smoker     Types: Cigarettes     Quit date:      Years since quittin.2    Smokeless tobacco: Never Used   Substance and Sexual Activity    Alcohol use: Yes     Comment: Social     Drug use: Never    Sexual activity: Yes   Other Topics Concern    Not on file   Social History Narrative    Not on file     Social Determinants of Health     Financial Resource Strain:     Difficulty of Paying Living Expenses: Not on file   Food Insecurity:     Worried About Running Out of Food in the Last Year: Not on file    Dixon of Food in the Last Year: Not on file   Transportation Needs:     Lack of Transportation (Medical): Not on file    Lack of Transportation (Non-Medical): Not on file   Physical Activity:     Days of Exercise per Week: Not on file    Minutes of Exercise per Session: Not on file   Stress:     Feeling of Stress : Not on file   Social Connections:     Frequency of Communication with Friends and Family: Not on file    Frequency of Social Gatherings with Friends and Family: Not on file    Attends Mandaen Services: Not on file    Active Member of [address] [place] [address] or Organizations: Not on file    Attends Club or Organization Meetings: Not on file    Marital Status: Not on file   Intimate Partner Violence:     Fear of Current or Ex-Partner: Not on file    Emotionally Abused: Not on file    Physically Abused: Not on file    Sexually Abused: Not on file   Housing Stability:     Unable to Pay for Housing in the Last Year: Not on file    Number of [place] in the Last Year: Not on file    Unstable Housing in the Last Year: Not on file         ALLERGIES: Suprax [cefixime]    Review of Systems   Constitutional: Positive for chills, diaphoresis and fever.    HENT: Negative for dental problem, drooling, rhinorrhea, trouble swallowing and voice change. Eyes: Negative for pain, redness and visual disturbance. Respiratory: Negative for choking. Gastrointestinal: Negative for abdominal pain, constipation, diarrhea, nausea and vomiting. Genitourinary: Negative for decreased urine volume, difficulty urinating and dysuria. Musculoskeletal: Positive for arthralgias and myalgias. Skin: Positive for wound. Neurological: Negative for facial asymmetry, speech difficulty and numbness. Psychiatric/Behavioral: Negative for confusion. All other systems reviewed and are negative. Vitals:    03/20/22 1400   BP: 100/73   Pulse: 100   Resp: 18   Temp: 98.4 °F (36.9 °C)   SpO2: 97%   Weight: (!) 176.9 kg (390 lb)   Height: 6' 8\" (2.032 m)            Physical Exam  Vitals and nursing note reviewed. Constitutional:       General: He is not in acute distress. Appearance: Normal appearance. He is well-developed. He is obese. He is not ill-appearing, toxic-appearing or diaphoretic. Comments: NAD, AxOx4, speaking in complete sentences       HENT:      Head: Normocephalic and atraumatic. Right Ear: External ear normal.      Left Ear: External ear normal.      Mouth/Throat:      Pharynx: No oropharyngeal exudate. Eyes:      General: No scleral icterus. Right eye: No discharge. Left eye: No discharge. Extraocular Movements: Extraocular movements intact. Conjunctiva/sclera: Conjunctivae normal.      Pupils: Pupils are equal, round, and reactive to light. Cardiovascular:      Rate and Rhythm: Normal rate and regular rhythm. Heart sounds: No murmur heard. No friction rub. No gallop. Pulmonary:      Effort: Pulmonary effort is normal. No respiratory distress. Breath sounds: Normal breath sounds. No stridor. No wheezing, rhonchi or rales. Chest:      Chest wall: No tenderness. Abdominal:      General: Bowel sounds are normal. There is no distension. Palpations: Abdomen is soft. There is no mass. Tenderness: There is no abdominal tenderness. There is no guarding or rebound. Hernia: No hernia is present. Musculoskeletal:         General: Tenderness and signs of injury present. No deformity. Normal range of motion. Cervical back: Normal range of motion and neck supple. Lumbar back: Tenderness present. No bony tenderness. Back:       Right lower leg: Edema present. Left lower leg: Edema present. Comments: L spine - noted well healing midline wound consistent w/ recent laminectomy; min ttp as indicated in red; ; no cauda equina sx; pt has distal motor/ CV/ Sensation grossly intact bilateral feet;    Lymphadenopathy:      Cervical: No cervical adenopathy. Skin:     General: Skin is warm and dry. Capillary Refill: Capillary refill takes less than 2 seconds. Findings: No bruising, erythema or rash. Neurological:      General: No focal deficit present. Mental Status: He is alert and oriented to person, place, and time. Cranial Nerves: No cranial nerve deficit. Sensory: No sensory deficit. Motor: No weakness. Coordination: Coordination normal.      Gait: Gait normal.      Deep Tendon Reflexes: Reflexes normal.          MDM       Procedures    4:18 PM  Patient in a merino bed states \"my pains are getting worse not better. \"  We will treat still awaiting CT results. 5:23 PM  Patient states \"feeling better now. Patient was told of CT results agree with orthopedic consultation plan to evaluate for admission. Perfect Serve Consult for Admission  5:24 PM    ED Room Number: ER04/04  Patient Name and age:  Satish Mariano. 80 y.o.  male  Working Diagnosis:   1. Wound cellulitis after surgery    2.  Status post lumbar laminectomy        COVID-19 Suspicion:  no  Sepsis present:  no  Reassessment needed: yes  Code Status:  Full Code  Readmission: no  Isolation Requirements:  no  Recommended Level of Care: med/surg  Department:[facility] ED - [phone]  Other:  Georgiana Aris Dr Clarene Kehr.  Jaya/ Claudio Cosme  Will admit;

## 2022-03-20 NOTE — ED TRIAGE NOTES
Pt S/P back surgery in mid February. Pt noted some drainage from area and chills x 1 week. EMS reports bandage clean with small amount of pus around site. VSS per EMS. Pt moved to wheelchair to triage.

## 2022-03-20 NOTE — ED NOTES
Verbal shift change report given to France Turner (oncoming nurse) by Augustine Becerra (offgoing nurse). Report included the following information SBAR, ED Summary, MAR and Recent Results.

## 2022-03-20 NOTE — PROGRESS NOTES
[address] Dosing Services: Antimicrobial Stewardship Daily Doc 3/20/2022    Consult for antibiotic dosing of Vancomycin plus Zosyn by Dr. Dr. Iris Chacon  Indication: Surgical wound cellulitis  Day of Therapy: 1    Ht Readings from Last 1 Encounters:   03/20/22 203.2 cm (80\")        Wt Readings from Last 1 Encounters:   03/20/22 (!) 176.9 kg (390 lb)      Vancomycin therapy:  Current maintenance dose: Initial dosing  Last level: Initial dosing   Dose calculated to approximate a           a. Target AUC/MENDEL of 400-500          b. Trough of 10-15 mcg/mL     Plan: Will start vancomycin 2500 mg x 1 followed by 1250 mg Q12 hrs for anticipated trough 14 mcg/ml  based on empiric calculated kinetics. Plan for Css trough closer to steady state. Creatinine every other day per protocol. Dose administration notes:   Doses given appropriately as scheduled    Date Dose & Interval Measured (mcg/mL) Extrapolated (mcg/mL)   ? ? ? ?   ? ? ? ?   ? ? ? ? Non-Kinetic Antimicrobial Dosing Regimen:   Current Regimen:  Zosyn 3.375 g Q8 hrs  Recommendation: Dose appropriate  Dose administration notes:   Doses given appropriately as scheduled    Other Antimicrobial   (not dosed by pharmacist) None   Cultures 3/20 Blood: Pending   Serum Creatinine Lab Results   Component Value Date/Time    Creatinine 0.96 03/20/2022 02:32 PM         Creatinine Clearance Estimated Creatinine Clearance: 130 mL/min (based on SCr of 0.96 mg/dL). Temp Temp: 98.4 °F (36.9 °C)       WBC Lab Results   Component Value Date/Time    WBC 13.5 (H) 03/20/2022 02:32 PM        Procalcitonin Lab Results   Component Value Date/Time    Procalcitonin 0.26 03/20/2022 02:32 PM        For Antifungals, Metronidazole and Nafcillin: Lab Results   Component Value Date/Time    ALT (SGPT) 22 03/20/2022 02:32 PM    AST (SGOT) 17 03/20/2022 02:32 PM    Alk.  phosphatase 75 03/20/2022 02:32 PM    Bilirubin, total 0.7 03/20/2022 02:32 PM        Pharmacist Aaron Schroeder, PHARMD

## 2022-03-20 NOTE — H&P
History & Physical    Primary Care Provider: Stella Joseph MD  Source of Information: Patient   Chief complaint: Fever and chills    History of Presenting Illness:   Henok Angel is a 80 y.o. male with past medical history of ankylosing spondylitis, Rheumatoid arthritis, lumbar stenosis with neurogenic claudication s/p L2-L5 laminectomy on 2/15/2022. Patient states he was doing well post surgery until last week Monday when he developed fever and chills. He said he started taking his MTX for rheumatoid arthritis last week Monday but later that evening he developed fever, shakes and chills. There was also associated back pain which progressively got worse. Today, he noticed light yellow discharge from the back wound staining his bed sheet so decided to come to the ER for evaluation. At the ER, blood work performed showed elevated leucocytosis, CRP 23.70. CT spine of the back showed subcutaneous fluid overlies the lumbar decompression levels with few gas bubbles tracking down to the epidural space. Review of Systems:  A comprehensive review of systems was negative except for that written in the History of Present Illness. Past Medical History:   Diagnosis Date    Abnormal stress test 12/2021    Cardiac cath done and normal 1/13/22    Ankylosing spondylitis ([facility] [address]    Dr. Oliver Mckeon.  neck, lumbar. sees pain management    Benign essential HTN     BPH (benign prostatic hyperplasia)     Dr. Ana Maria Olvera. PSA 0.3 4/15/21, 0.6 5/1/20    Chronic edema     compression stockings    Colon polyp     hx.  last colonoscopy 2019.  COVID-19 vaccine series completed     DJD (degenerative joint disease) of cervical spine     Dr. David Stanley (dyspnea on exertion)     History of basal cell cancer 2013    face- Dr. Frances Lopez. MOHS    History of bilateral knee replacement     Dr. Prince Cardozo    History of DVT (deep vein thrombosis)     left leg.       Insomnia     takes scarlett per Dr. Porfirio Gaimno Kidney stone     Lumbar spinal stenosis 2011    Dr Ana Maria Mcdonough, Dr. Solomon De Leon. MRI 9/2021, 9/2017. hx of injections, nerve ablation    Neuropathy     feet. due to spinal dz?  saw neurology. hx GTT    JUDY on CPAP     Dr. Nydia Moore    Psoriasis     Rheumatoid arthritis of cervical spine (HCC)     Limited ROM    SOB (shortness of breath) 2014    Dr. Torito South abnormal stress. cath 2014 per pt      Past Surgical History:   Procedure Laterality Date    HX COLONOSCOPY  10/15/2019    Dr. [email or web address]. normal (report received). repeat 5 years    HX COLONOSCOPY      hx polyps    HX HEART CATHETERIZATION  2014    Dr Nathan Hassan  01/03/2022    Dr. Xena Vega Bilateral     HX LITHOTRIPSY      HX [address] PROCEDURES  2013? facial BCC    HX SHOULDER ARTHROSCOPY Left     HX WRIST FRACTURE TX Right      Prior to Admission medications    Medication Sig Start Date End Date Taking? Authorizing Provider   furosemide (LASIX) 20 mg tablet TAKE 1 TABLET BY MOUTH DAILY AS NEEDED (FOR SWELLING (EDEMA). TAKE FOR 3 DAYS MAX). 3/7/22   Tonya Cross NP   cyclobenzaprine (FLEXERIL) 10 mg tablet Take 1 Tablet by mouth three (3) times daily as needed for Muscle Spasm(s). 2/18/22   Paul Braun NP   docusate sodium (COLACE) 100 mg capsule Take 1 Capsule by mouth two (2) times a day. 2/18/22   Paul Braun NP   cyanocobalamin, vitamin B-12, (VITAMIN B-12 PO) Take 2,500 mcg by mouth daily. Provider, Historical   cetirizine (ZyrTEC) 10 mg tablet Take 10 mg by mouth as needed for Allergies. Provider, Historical   valsartan-hydroCHLOROthiazide (DIOVAN-HCT) 160-12.5 mg per tablet TAKE 1 TABLET BY MOUTH EVERY DAY 9/3/21   Any Littlejohn MD   tamsulosin (FLOMAX) 0.4 mg capsule Take 0.4 mg by mouth daily.  7/30/19   Provider, Historical   folic acid (FOLVITE) 1 mg tablet TAKE 1 TABLET BY MOUTH EVERY DAY 7/20/20   Provider, Historical   augmented betamethasone dipropionate (DIPROLENE-AF) 0.05 % ointment Apply  to affected area as needed for Skin Irritation. Provider, Historical     Allergies   Allergen Reactions    Suprax [Cefixime] Other (comments)     Pt states it paralyzed him  Tolerated cefazolin during admission 2/15/22      Family History   Problem Relation Age of Onset    Heart Disease Father     OSTEOARTHRITIS Father     OSTEOARTHRITIS Sister         SOCIAL HISTORY:  Patient resides: Single. No children  Independently x   Assisted Living    SNF    With family care       Smoking history:   None x   Former    Chronic      Alcohol history:   None    Social x   Chronic      Ambulates:   Independently x   w/cane    w/walker    w/wc    CODE STATUS:  DNR    Full x   Other      Objective:     Physical Exam:     Visit Vitals  BP (!) 125/55   Pulse 100   Temp 98.4 °F (36.9 °C)   Resp 18   Ht 6' 8\" (2.032 m)   Wt (!) 176.9 kg (390 lb)   SpO2 95%   BMI 42.84 kg/m²      O2 Device: None (Room air)    General:  Alert, cooperative, no distress, appears stated age. Head:  Normocephalic, without obvious abnormality, atraumatic. Eyes:  Conjunctivae/corneas clear. PERRL, EOMs intact. Nose: Nares normal. Septum midline. Mucosa normal. No drainage or sinus tenderness. Throat: Lips, mucosa, and tongue normal. Teeth and gums normal.   Neck: Supple, symmetrical, trachea midline, no adenopathy, thyroid: no enlargement/tenderness/nodules, no carotid bruit and no JVD. Back:   Lumbar wound- purulent discharge    Lungs:   Clear to auscultation bilaterally. Chest wall:  No tenderness or deformity. Heart:  Regular rate and rhythm, S1, S2 normal, no murmur, click, rub or gallop. Abdomen:   Soft, non-tender. Bowel sounds normal. No masses,  No organomegaly. Extremities: Extremities normal, atraumatic, no cyanosis or edema. Pulses: 2+ and symmetric all extremities.    Skin: Skin color, texture, turgor normal. No rashes or lesions   Neurologic: CNII-XII intact. Moves all extremities         Data Review:     Recent Days:  Recent Labs     03/20/22  1432   WBC 13.5*   HGB 12.0*   HCT 38.5        Recent Labs     03/20/22  1432   *   K 4.2   CL 98   CO2 29   GLU 89   BUN 15   CREA 0.96   CA 9.2   ALB 2.6*   ALT 22     No results for input(s): PH, PCO2, PO2, HCO3, FIO2 in the last 72 hours. 24 Hour Results:  Recent Results (from the past 24 hour(s))   CBC WITH AUTOMATED DIFF    Collection Time: 03/20/22  2:32 PM   Result Value Ref Range    WBC 13.5 (H) 4.1 - 11.1 K/uL    RBC 4.69 4.10 - 5.70 M/uL    HGB 12.0 (L) 12.1 - 17.0 g/dL    HCT 38.5 36.6 - 50.3 %    MCV 82.1 80.0 - 99.0 FL    MCH 25.6 (L) 26.0 - 34.0 PG    MCHC 31.2 30.0 - 36.5 g/dL    RDW 14.4 11.5 - 14.5 %    PLATELET 409 472 - 269 K/uL    MPV 9.5 8.9 - 12.9 FL    NRBC 0.0 0  WBC    ABSOLUTE NRBC 0.00 0.00 - 0.01 K/uL    NEUTROPHILS 68 32 - 75 %    LYMPHOCYTES 17 12 - 49 %    MONOCYTES 13 5 - 13 %    EOSINOPHILS 1 0 - 7 %    BASOPHILS 0 0 - 1 %    IMMATURE GRANULOCYTES 1 (H) 0.0 - 0.5 %    ABS. NEUTROPHILS 9.1 (H) 1.8 - 8.0 K/UL    ABS. LYMPHOCYTES 2.3 0.8 - 3.5 K/UL    ABS. MONOCYTES 1.8 (H) 0.0 - 1.0 K/UL    ABS. EOSINOPHILS 0.1 0.0 - 0.4 K/UL    ABS. BASOPHILS 0.1 0.0 - 0.1 K/UL    ABS. IMM. GRANS. 0.2 (H) 0.00 - 0.04 K/UL    DF AUTOMATED     METABOLIC PANEL, COMPREHENSIVE    Collection Time: 03/20/22  2:32 PM   Result Value Ref Range    Sodium 132 (L) 136 - 145 mmol/L    Potassium 4.2 3.5 - 5.1 mmol/L    Chloride 98 97 - 108 mmol/L    CO2 29 21 - 32 mmol/L    Anion gap 5 5 - 15 mmol/L    Glucose 89 65 - 100 mg/dL    BUN 15 6 - 20 MG/DL    Creatinine 0.96 0.70 - 1.30 MG/DL    BUN/Creatinine ratio 16 12 - 20      GFR est AA >60 >60 ml/min/1.73m2    GFR est non-AA >60 >60 ml/min/1.73m2    Calcium 9.2 8.5 - 10.1 MG/DL    Bilirubin, total 0.7 0.2 - 1.0 MG/DL    ALT (SGPT) 22 12 - 78 U/L    AST (SGOT) 17 15 - 37 U/L    Alk.  phosphatase 75 45 - 117 U/L    Protein, total 7.8 6.4 - 8.2 g/dL Albumin 2.6 (L) 3.5 - 5.0 g/dL    Globulin 5.2 (H) 2.0 - 4.0 g/dL    A-G Ratio 0.5 (L) 1.1 - 2.2     PROCALCITONIN    Collection Time: 03/20/22  2:32 PM   Result Value Ref Range    Procalcitonin 0.26 ng/mL   C REACTIVE PROTEIN, QT    Collection Time: 03/20/22  2:32 PM   Result Value Ref Range    C-Reactive protein 23.70 (H) 0.00 - 0.60 mg/dL   SED RATE (ESR)    Collection Time: 03/20/22  2:32 PM   Result Value Ref Range    Sed rate, automated 69 (H) 0 - 20 mm/hr         Imaging:     Assessment:     Principal Problem:    Postoperative cellulitis of surgical wound (3/20/2022)    Active Problems:    Surgical wound infection (3/20/2022)           Plan:     1. Post surgical wound purulent cellulitis:POA  CT scan showed subcutaneous fluid overlies the lumbar decompression levels with few madeline bubbles  +ve leucocytosis with elevated CRP in an immunocompromised patient on MTX and remicaid  - No evidence of sepsis  - We will start empiric broad spectrum abx- Zosyn and vancomycin to cover Staph species including MRSA, strep and GNR  - Keep NPO past MN for I&D of surgical wound  - F/u on wound culture,gram stain and sensitivity    2. H/o Ankylosing spondylitis      Rheumatoid arthritis  - Hold home MTX    3. Obesity  4. DVT PPX: SCD  5. Dispo: Admit patient to medical floor  Surrogate decision maker;  Brenda Byrd- Sister  Baseline functional status: Independent  Code Status: full       Signed By: Hugh Alan MD     March 20, 2022

## 2022-03-21 ENCOUNTER — ANESTHESIA (OUTPATIENT)
Dept: SURGERY | Age: 72
DRG: 863 | End: 2022-03-21
Payer: MEDICARE

## 2022-03-21 ENCOUNTER — ANESTHESIA EVENT (OUTPATIENT)
Dept: SURGERY | Age: 72
DRG: 863 | End: 2022-03-21
Payer: MEDICARE

## 2022-03-21 PROCEDURE — 74011000258 HC RX REV CODE- 258: Performed by: NURSE ANESTHETIST, CERTIFIED REGISTERED

## 2022-03-21 PROCEDURE — 74011000250 HC RX REV CODE- 250: Performed by: HOSPITALIST

## 2022-03-21 PROCEDURE — 77030008684 HC TU ET CUF COVD -B: Performed by: ANESTHESIOLOGY

## 2022-03-21 PROCEDURE — 74011250636 HC RX REV CODE- 250/636: Performed by: ORTHOPAEDIC SURGERY

## 2022-03-21 PROCEDURE — 74011250636 HC RX REV CODE- 250/636: Performed by: NURSE ANESTHETIST, CERTIFIED REGISTERED

## 2022-03-21 PROCEDURE — 77030031139 HC SUT VCRL2 J&J -A: Performed by: ORTHOPAEDIC SURGERY

## 2022-03-21 PROCEDURE — 74011000258 HC RX REV CODE- 258: Performed by: HOSPITALIST

## 2022-03-21 PROCEDURE — 77030038692 HC WND DEB SYS IRMX -B: Performed by: ORTHOPAEDIC SURGERY

## 2022-03-21 PROCEDURE — 74011250636 HC RX REV CODE- 250/636: Performed by: ANESTHESIOLOGY

## 2022-03-21 PROCEDURE — 74011250637 HC RX REV CODE- 250/637: Performed by: HOSPITALIST

## 2022-03-21 PROCEDURE — 77030040922 HC BLNKT HYPOTHRM STRY -A

## 2022-03-21 PROCEDURE — 0J970ZZ DRAINAGE OF BACK SUBCUTANEOUS TISSUE AND FASCIA, OPEN APPROACH: ICD-10-PCS | Performed by: PSYCHIATRY & NEUROLOGY

## 2022-03-21 PROCEDURE — 65270000029 HC RM PRIVATE

## 2022-03-21 PROCEDURE — C9290 INJ, BUPIVACAINE LIPOSOME: HCPCS | Performed by: ORTHOPAEDIC SURGERY

## 2022-03-21 PROCEDURE — 74011000250 HC RX REV CODE- 250: Performed by: NURSE ANESTHETIST, CERTIFIED REGISTERED

## 2022-03-21 PROCEDURE — 74011250636 HC RX REV CODE- 250/636: Performed by: INTERNAL MEDICINE

## 2022-03-21 PROCEDURE — 76010000133 HC OR TIME 3 TO 3.5 HR: Performed by: ORTHOPAEDIC SURGERY

## 2022-03-21 PROCEDURE — 77030013140 HC MSK NEB VYRM -A

## 2022-03-21 PROCEDURE — 87147 CULTURE TYPE IMMUNOLOGIC: CPT

## 2022-03-21 PROCEDURE — 77030003666 HC NDL SPINAL BD -A: Performed by: ORTHOPAEDIC SURGERY

## 2022-03-21 PROCEDURE — 77030002982 HC SUT POLYSRB J&J -A: Performed by: ORTHOPAEDIC SURGERY

## 2022-03-21 PROCEDURE — 74011250637 HC RX REV CODE- 250/637: Performed by: INTERNAL MEDICINE

## 2022-03-21 PROCEDURE — 74011250636 HC RX REV CODE- 250/636: Performed by: HOSPITALIST

## 2022-03-21 PROCEDURE — 87205 SMEAR GRAM STAIN: CPT

## 2022-03-21 PROCEDURE — 87075 CULTR BACTERIA EXCEPT BLOOD: CPT

## 2022-03-21 PROCEDURE — 74011000250 HC RX REV CODE- 250: Performed by: ORTHOPAEDIC SURGERY

## 2022-03-21 PROCEDURE — 77010033678 HC OXYGEN DAILY

## 2022-03-21 PROCEDURE — 76210000016 HC OR PH I REC 1 TO 1.5 HR: Performed by: ORTHOPAEDIC SURGERY

## 2022-03-21 PROCEDURE — 77030013079 HC BLNKT BAIR HGGR 3M -A: Performed by: ANESTHESIOLOGY

## 2022-03-21 PROCEDURE — 77030040361 HC SLV COMPR DVT MDII -B

## 2022-03-21 PROCEDURE — 2709999900 HC NON-CHARGEABLE SUPPLY: Performed by: ORTHOPAEDIC SURGERY

## 2022-03-21 PROCEDURE — 77030040356 HC CORD BPLR FRCP COVD -A: Performed by: ORTHOPAEDIC SURGERY

## 2022-03-21 PROCEDURE — 76060000037 HC ANESTHESIA 3 TO 3.5 HR: Performed by: ORTHOPAEDIC SURGERY

## 2022-03-21 RX ORDER — DEXAMETHASONE SODIUM PHOSPHATE 4 MG/ML
INJECTION, SOLUTION INTRA-ARTICULAR; INTRALESIONAL; INTRAMUSCULAR; INTRAVENOUS; SOFT TISSUE AS NEEDED
Status: DISCONTINUED | OUTPATIENT
Start: 2022-03-21 | End: 2022-03-21 | Stop reason: HOSPADM

## 2022-03-21 RX ORDER — FENTANYL CITRATE 50 UG/ML
INJECTION, SOLUTION INTRAMUSCULAR; INTRAVENOUS AS NEEDED
Status: DISCONTINUED | OUTPATIENT
Start: 2022-03-21 | End: 2022-03-21 | Stop reason: HOSPADM

## 2022-03-21 RX ORDER — SODIUM CHLORIDE, SODIUM LACTATE, POTASSIUM CHLORIDE, CALCIUM CHLORIDE 600; 310; 30; 20 MG/100ML; MG/100ML; MG/100ML; MG/100ML
150 INJECTION, SOLUTION INTRAVENOUS CONTINUOUS
Status: DISCONTINUED | OUTPATIENT
Start: 2022-03-21 | End: 2022-03-21 | Stop reason: HOSPADM

## 2022-03-21 RX ORDER — HYDROMORPHONE HYDROCHLORIDE 1 MG/ML
0.5 INJECTION, SOLUTION INTRAMUSCULAR; INTRAVENOUS; SUBCUTANEOUS
Status: DISCONTINUED | OUTPATIENT
Start: 2022-03-21 | End: 2022-03-21 | Stop reason: HOSPADM

## 2022-03-21 RX ORDER — METHOTREXATE 25 MG/ML
INJECTION INTRA-ARTERIAL; INTRAMUSCULAR; INTRATHECAL; INTRAVENOUS
COMMUNITY
End: 2022-03-25

## 2022-03-21 RX ORDER — HYDROMORPHONE HYDROCHLORIDE 1 MG/ML
1 INJECTION, SOLUTION INTRAMUSCULAR; INTRAVENOUS; SUBCUTANEOUS
Status: DISCONTINUED | OUTPATIENT
Start: 2022-03-21 | End: 2022-03-22

## 2022-03-21 RX ORDER — DIPHENHYDRAMINE HYDROCHLORIDE 50 MG/ML
12.5 INJECTION, SOLUTION INTRAMUSCULAR; INTRAVENOUS AS NEEDED
Status: DISCONTINUED | OUTPATIENT
Start: 2022-03-21 | End: 2022-03-21 | Stop reason: HOSPADM

## 2022-03-21 RX ORDER — ONDANSETRON 2 MG/ML
4 INJECTION INTRAMUSCULAR; INTRAVENOUS AS NEEDED
Status: DISCONTINUED | OUTPATIENT
Start: 2022-03-21 | End: 2022-03-21 | Stop reason: HOSPADM

## 2022-03-21 RX ORDER — MIDAZOLAM HYDROCHLORIDE 1 MG/ML
INJECTION, SOLUTION INTRAMUSCULAR; INTRAVENOUS AS NEEDED
Status: DISCONTINUED | OUTPATIENT
Start: 2022-03-21 | End: 2022-03-21 | Stop reason: HOSPADM

## 2022-03-21 RX ORDER — ALBUTEROL SULFATE 0.83 MG/ML
2.5 SOLUTION RESPIRATORY (INHALATION) AS NEEDED
Status: DISCONTINUED | OUTPATIENT
Start: 2022-03-21 | End: 2022-03-21 | Stop reason: HOSPADM

## 2022-03-21 RX ORDER — EPHEDRINE SULFATE/0.9% NACL/PF 50 MG/5 ML
SYRINGE (ML) INTRAVENOUS AS NEEDED
Status: DISCONTINUED | OUTPATIENT
Start: 2022-03-21 | End: 2022-03-21 | Stop reason: HOSPADM

## 2022-03-21 RX ORDER — REMIFENTANIL HYDROCHLORIDE 1 MG/ML
INJECTION, POWDER, LYOPHILIZED, FOR SOLUTION INTRAVENOUS
Status: DISCONTINUED | OUTPATIENT
Start: 2022-03-21 | End: 2022-03-21 | Stop reason: HOSPADM

## 2022-03-21 RX ORDER — VANCOMYCIN HYDROCHLORIDE 1 G/20ML
INJECTION, POWDER, LYOPHILIZED, FOR SOLUTION INTRAVENOUS AS NEEDED
Status: DISCONTINUED | OUTPATIENT
Start: 2022-03-21 | End: 2022-03-21 | Stop reason: HOSPADM

## 2022-03-21 RX ORDER — SODIUM CHLORIDE, SODIUM LACTATE, POTASSIUM CHLORIDE, CALCIUM CHLORIDE 600; 310; 30; 20 MG/100ML; MG/100ML; MG/100ML; MG/100ML
125 INJECTION, SOLUTION INTRAVENOUS CONTINUOUS
Status: DISCONTINUED | OUTPATIENT
Start: 2022-03-21 | End: 2022-03-21 | Stop reason: HOSPADM

## 2022-03-21 RX ORDER — LIDOCAINE HYDROCHLORIDE 10 MG/ML
0.1 INJECTION, SOLUTION EPIDURAL; INFILTRATION; INTRACAUDAL; PERINEURAL AS NEEDED
Status: DISCONTINUED | OUTPATIENT
Start: 2022-03-21 | End: 2022-03-21 | Stop reason: HOSPADM

## 2022-03-21 RX ORDER — ROCURONIUM BROMIDE 10 MG/ML
INJECTION, SOLUTION INTRAVENOUS AS NEEDED
Status: DISCONTINUED | OUTPATIENT
Start: 2022-03-21 | End: 2022-03-21 | Stop reason: HOSPADM

## 2022-03-21 RX ORDER — HYDROMORPHONE HYDROCHLORIDE 2 MG/ML
INJECTION, SOLUTION INTRAMUSCULAR; INTRAVENOUS; SUBCUTANEOUS AS NEEDED
Status: DISCONTINUED | OUTPATIENT
Start: 2022-03-21 | End: 2022-03-21 | Stop reason: HOSPADM

## 2022-03-21 RX ORDER — PROPOFOL 10 MG/ML
INJECTION, EMULSION INTRAVENOUS AS NEEDED
Status: DISCONTINUED | OUTPATIENT
Start: 2022-03-21 | End: 2022-03-21 | Stop reason: HOSPADM

## 2022-03-21 RX ORDER — ONDANSETRON 2 MG/ML
INJECTION INTRAMUSCULAR; INTRAVENOUS AS NEEDED
Status: DISCONTINUED | OUTPATIENT
Start: 2022-03-21 | End: 2022-03-21 | Stop reason: HOSPADM

## 2022-03-21 RX ADMIN — FENTANYL CITRATE 100 MCG: 50 INJECTION, SOLUTION INTRAMUSCULAR; INTRAVENOUS at 16:53

## 2022-03-21 RX ADMIN — Medication 10 ML: at 09:27

## 2022-03-21 RX ADMIN — SODIUM CHLORIDE 12 MCG: 9 INJECTION, SOLUTION INTRAVENOUS at 18:42

## 2022-03-21 RX ADMIN — PROPOFOL 200 MG: 10 INJECTION, EMULSION INTRAVENOUS at 16:17

## 2022-03-21 RX ADMIN — DOCUSATE SODIUM 100 MG: 100 CAPSULE ORAL at 21:43

## 2022-03-21 RX ADMIN — Medication 10 MG: at 17:07

## 2022-03-21 RX ADMIN — HYDROMORPHONE HYDROCHLORIDE 0.5 MG: 1 INJECTION, SOLUTION INTRAMUSCULAR; INTRAVENOUS; SUBCUTANEOUS at 19:51

## 2022-03-21 RX ADMIN — OXYCODONE AND ACETAMINOPHEN 1 TABLET: 5; 325 TABLET ORAL at 08:41

## 2022-03-21 RX ADMIN — DOCUSATE SODIUM 100 MG: 100 CAPSULE ORAL at 08:41

## 2022-03-21 RX ADMIN — DEXAMETHASONE SODIUM PHOSPHATE 8 MG: 4 INJECTION, SOLUTION INTRAMUSCULAR; INTRAVENOUS at 17:24

## 2022-03-21 RX ADMIN — MIDAZOLAM HYDROCHLORIDE 3 MG: 1 INJECTION, SOLUTION INTRAMUSCULAR; INTRAVENOUS at 15:57

## 2022-03-21 RX ADMIN — HYDROMORPHONE HYDROCHLORIDE 2 MG: 2 INJECTION INTRAMUSCULAR; INTRAVENOUS; SUBCUTANEOUS at 18:01

## 2022-03-21 RX ADMIN — FENTANYL CITRATE 150 MCG: 50 INJECTION, SOLUTION INTRAMUSCULAR; INTRAVENOUS at 16:24

## 2022-03-21 RX ADMIN — ONDANSETRON HYDROCHLORIDE 4 MG: 2 SOLUTION INTRAMUSCULAR; INTRAVENOUS at 17:18

## 2022-03-21 RX ADMIN — SODIUM CHLORIDE 20 MCG: 9 INJECTION, SOLUTION INTRAVENOUS at 15:57

## 2022-03-21 RX ADMIN — HYDROMORPHONE HYDROCHLORIDE 0.5 MG: 1 INJECTION, SOLUTION INTRAMUSCULAR; INTRAVENOUS; SUBCUTANEOUS at 19:21

## 2022-03-21 RX ADMIN — REMIFENTANIL HYDROCHLORIDE 0.06 MCG/KG/MIN: 1 INJECTION, POWDER, LYOPHILIZED, FOR SOLUTION INTRAVENOUS at 16:05

## 2022-03-21 RX ADMIN — SODIUM CHLORIDE 75 ML/HR: 9 INJECTION, SOLUTION INTRAVENOUS at 21:54

## 2022-03-21 RX ADMIN — MORPHINE SULFATE 2 MG: 2 INJECTION, SOLUTION INTRAMUSCULAR; INTRAVENOUS at 02:43

## 2022-03-21 RX ADMIN — FOLIC ACID 1 MG: 1 TABLET ORAL at 08:41

## 2022-03-21 RX ADMIN — VANCOMYCIN HYDROCHLORIDE 1250 MG: 1.25 INJECTION, POWDER, LYOPHILIZED, FOR SOLUTION INTRAVENOUS at 21:41

## 2022-03-21 RX ADMIN — MIDAZOLAM HYDROCHLORIDE 2 MG: 1 INJECTION, SOLUTION INTRAMUSCULAR; INTRAVENOUS at 16:01

## 2022-03-21 RX ADMIN — OXYCODONE AND ACETAMINOPHEN 1 TABLET: 5; 325 TABLET ORAL at 04:09

## 2022-03-21 RX ADMIN — VANCOMYCIN HYDROCHLORIDE 1250 MG: 1.25 INJECTION, POWDER, LYOPHILIZED, FOR SOLUTION INTRAVENOUS at 06:39

## 2022-03-21 RX ADMIN — HYDROMORPHONE HYDROCHLORIDE 1 MG: 1 INJECTION, SOLUTION INTRAMUSCULAR; INTRAVENOUS; SUBCUTANEOUS at 09:27

## 2022-03-21 RX ADMIN — Medication 10 ML: at 21:47

## 2022-03-21 RX ADMIN — PIPERACILLIN AND TAZOBACTAM 3.38 G: 3; .375 INJECTION, POWDER, LYOPHILIZED, FOR SOLUTION INTRAVENOUS at 02:44

## 2022-03-21 RX ADMIN — HYDROMORPHONE HYDROCHLORIDE 0.5 MG: 1 INJECTION, SOLUTION INTRAMUSCULAR; INTRAVENOUS; SUBCUTANEOUS at 19:34

## 2022-03-21 RX ADMIN — PIPERACILLIN AND TAZOBACTAM 3.38 G: 3; .375 INJECTION, POWDER, LYOPHILIZED, FOR SOLUTION INTRAVENOUS at 21:40

## 2022-03-21 RX ADMIN — Medication 3 MG: at 21:43

## 2022-03-21 RX ADMIN — SODIUM CHLORIDE 10 MCG: 9 INJECTION, SOLUTION INTRAVENOUS at 16:04

## 2022-03-21 RX ADMIN — SODIUM CHLORIDE 20 MCG: 9 INJECTION, SOLUTION INTRAVENOUS at 16:02

## 2022-03-21 RX ADMIN — HYDROMORPHONE HYDROCHLORIDE 1 MG: 1 INJECTION, SOLUTION INTRAMUSCULAR; INTRAVENOUS; SUBCUTANEOUS at 14:18

## 2022-03-21 RX ADMIN — OXYCODONE AND ACETAMINOPHEN 1 TABLET: 5; 325 TABLET ORAL at 21:43

## 2022-03-21 RX ADMIN — SODIUM CHLORIDE 20 MCG: 9 INJECTION, SOLUTION INTRAVENOUS at 16:00

## 2022-03-21 RX ADMIN — ROCURONIUM BROMIDE 40 MG: 10 INJECTION INTRAVENOUS at 16:17

## 2022-03-21 RX ADMIN — PIPERACILLIN AND TAZOBACTAM 3.38 G: 3; .375 INJECTION, POWDER, LYOPHILIZED, FOR SOLUTION INTRAVENOUS at 12:51

## 2022-03-21 RX ADMIN — ENOXAPARIN SODIUM 60 MG: 100 INJECTION SUBCUTANEOUS at 21:36

## 2022-03-21 RX ADMIN — SODIUM CHLORIDE, POTASSIUM CHLORIDE, SODIUM LACTATE AND CALCIUM CHLORIDE 150 ML/HR: 600; 310; 30; 20 INJECTION, SOLUTION INTRAVENOUS at 12:50

## 2022-03-21 RX ADMIN — TAMSULOSIN HYDROCHLORIDE 0.4 MG: 0.4 CAPSULE ORAL at 08:41

## 2022-03-21 RX ADMIN — Medication 15 MG: at 16:45

## 2022-03-21 RX ADMIN — MORPHINE SULFATE 2 MG: 2 INJECTION, SOLUTION INTRAMUSCULAR; INTRAVENOUS at 06:40

## 2022-03-21 NOTE — H&P
Date of Surgery Update:  Naunpaul Davies. was seen and examined. History and physical has been reviewed. The patient has been examined.  There have been no significant clinical changes since the completion of the originally dated History and Physical.    Signed By: Juanjose Helm MD     March 21, 2022 1:47 PM

## 2022-03-21 NOTE — ED NOTES
Pt was provided a dinner meal, ginger ale and some crackers. Pt stated he was in pain. Reached out to attending as pt stated tylenol is not going to cut it.

## 2022-03-21 NOTE — PROGRESS NOTES
Patient with concerns of pain control overnight. Dr. Jimenez Cole on floor and notified. Pain medication changed. Patient concerns about procedure. PA notified. Elective ortho now onboard. Lulu KOWALSKI Notified.

## 2022-03-21 NOTE — PROGRESS NOTES
Juan C Moulton called and [facility] bariatric bed ordered for patient. SPoke with Oz Kee.

## 2022-03-21 NOTE — PROGRESS NOTES
Bedside and Verbal shift change report given to [facility] (oncoming nurse) by Emiliano Gerber RN (offgoing nurse). Report included the following information SBAR, Kardex, Intake/Output and Recent Results. Informed Darcy Rank that patient is scheduled for I&D and has been NPO since midnight. Also, patient felt that pain medication was not sufficient to alleviate his back pain.

## 2022-03-21 NOTE — PROGRESS NOTES
ORTHOPAEDIC LUMBAR SPINE SURGERY PROGRESS NOTE    NAME:     Charo Robles :       1950   MRN:       [id number]   DATE:      3/21/2022    POD:    Day of Surgery  S/P:    Procedure(s):  SPINE INCISION AND DRAINAGE LUMBAR    SUBJECTIVE:    C/O back pain (R>L)  No leg pain or numbness  Denies nausea/vomiting, headache, chest pain or shortness of breath  Pain controlled    Pt notes that he started having some increase in his PO low back pain Monday night (right > left). Also notes developing some chills, myalgias and subjective fevers (Tmax approx 99 F) per pt  Thinks that there was a possible single spot of drainage on Wednesday  Was seen by HHPT on Friday- was told that his incision site looked good   Then developed significant drainage from his incision site on Saturday which prompted him to seek evaluation at [facility] ED, was then re-admitted to [facility]    Currently on Vanc and Zosyn IV    Recent Labs     22  1432   HGB 12.0*   HCT 38.5   *   K 4.2   CL 98   CO2 29   BUN 15   CREA 0.96   GLU 89     Patient Vitals for the past 12 hrs:   BP Temp Pulse Resp SpO2   22 0812 111/68 98.5 °F (36.9 °C) 74 20 95 %   22 2249 (!) 140/68 98.6 °F (37 °C) 85 21 97 %       EXAM:  Purulent yellow drainage noted on dressing. Dressing(s) intact. Dressing removed on exam. New sterile dressing(s) placed   Positive strength/ROM bilat lower ext.   Neuro intact to sensation  Calves, soft & nontender  BL LEs NVID      PLAN:  Continue prn pain medications as ordered  NPO   IVFs   Will go to the OR today for a Lumbar Incision and Drainage as discussed with patient  Patient agrees with plan      Heather Zuñiga [place]  Orthopaedic Surgery  Physician Assistant to Dr. Javier Delgado

## 2022-03-21 NOTE — CONSULTS
ORTHOPEDIC SURGERY CONSULT    Subjective:     Date of Consultation:  March 20, 2022    Referring Physician:  Dr. Yamilka Soliz. is a 80 y.o. male who is being seen for draining lumbar incision. He has a past medical history of ankylosing spondylitis, RA on DMARD, HTN, BPH, chronic LE edema, KING, insomnia, JUDY on CPAP, and lumbar spondylosis. He presents from home via EMS after noticing increasing drainage from his lumbar incision. He underwent a L2-L5 laminectomy, facetectomy, and foraminotomy. Per the patient he had a normal postoperative recovery and was doing quite well. He had inhome PT and was improving. He states that he had an appointment last week and was cleared to begin Methotrexate on Monday. He reports an increased temperature and general malaise starting on 3/14. By Friday, he reported severe lumbar pain and his incision was draining. He woke up Saturday with his sheet saturated in clear and yellow drainage. He reports no fever at home only increased temperature. He denies bowel or bladder incontinence. He denies radicular pain or LE weakness.       Patient Active Problem List    Diagnosis Date Noted    Postoperative cellulitis of surgical wound 03/20/2022    Surgical wound infection 03/20/2022    Lumbar stenosis with neurogenic claudication 02/15/2022    Obesity, morbid ([address] 08/31/2020    Lumbar spinal stenosis     Psoriasis     BPH (benign prostatic hyperplasia)     Benign essential HTN     DJD (degenerative joint disease) of cervical spine     JUDY on CPAP     Neuropathy     History of bilateral knee replacement     Insomnia     Colon polyp     Chronic edema     History of DVT (deep vein thrombosis)     Syncope 04/09/2015    History of basal cell cancer 2013    Ankylosing spondylitis ([address] 1977     Family History   Problem Relation Age of Onset    Heart Disease Father     OSTEOARTHRITIS Father     OSTEOARTHRITIS Sister       Social History     Tobacco Use    Smoking status: Former Smoker     Types: Cigarettes     Quit date:      Years since quittin.2    Smokeless tobacco: Never Used   Substance Use Topics    Alcohol use: Yes     Comment: Social      Past Medical History:   Diagnosis Date    Abnormal stress test 2021    Cardiac cath done and normal 22    [address]ylosing sp[address]yl[address] [address]     Dr. Neetu Paz.  neck, lumbar. sees pain management    Benign essential HTN     BPH (benign prostatic hyperplasia)     Dr. Suleman Cordova. PSA 0.3 4/15/21, 0.6 20    Chronic edema     compression stockings    Colon polyp     hx.  last colonoscopy .  COVID-19 vaccine series completed     DJD (degenerative joint disease) of cervical spine     Dr. Margarita Xie (dyspnea on exertion)     History of basal cell cancer     face- Dr. Taye Reyes. MOHS    History of bilateral knee replacement     Dr. Pranay Ramos    History of DVT (deep vein thrombosis)     left leg.  Insomnia     takes ambien per Dr. Roxane Chamberlain Kidney stone     Lumbar spinal stenosis     Dr Cristobal Campbell, Dr. Taryn Kaye. MRI 2021, 2017. hx of injections, nerve ablation    Neuropathy     feet. due to spinal dz?  saw neurology. hx GTT    JUDY on CPAP     Dr. Oz Garcia    Psoriasis     Rheumatoid arthritis of cervical spine (HCC)     Limited ROM    SOB (shortness of breath)     Dr. Can Verdugo abnormal stress. cath 2014 per pt      Past Surgical History:   Procedure Laterality Date    HX COLONOSCOPY  10/15/2019    Dr. [email or web address]. normal (report received). repeat 5 years    HX COLONOSCOPY      hx polyps    HX HEART CATHETERIZATION      Dr Amanda Mayers  2022    Dr. Marci Hawk Bilateral     HX LITHOTRIPSY      HX [address] PROCEDURES  ? facial BCC    HX SHOULDER ARTHROSCOPY Left     HX WRIST FRACTURE TX Right       Prior to Admission medications    Medication Sig Start Date End Date Taking?  Authorizing Provider   furosemide (LASIX) 20 mg tablet TAKE 1 TABLET BY MOUTH DAILY AS NEEDED (FOR SWELLING (EDEMA). TAKE FOR 3 DAYS MAX). 3/7/22   Claudia Wise NP   cyclobenzaprine (FLEXERIL) 10 mg tablet Take 1 Tablet by mouth three (3) times daily as needed for Muscle Spasm(s). 2/18/22   Aliyah Peguero NP   docusate sodium (COLACE) 100 mg capsule Take 1 Capsule by mouth two (2) times a day. 2/18/22   Aliyah Peguero NP   cyanocobalamin, vitamin B-12, (VITAMIN B-12 PO) Take 2,500 mcg by mouth daily. Provider, Historical   cetirizine (ZyrTEC) 10 mg tablet Take 10 mg by mouth as needed for Allergies. Provider, Historical   valsartan-hydroCHLOROthiazide (DIOVAN-HCT) 160-12.5 mg per tablet TAKE 1 TABLET BY MOUTH EVERY DAY 9/3/21   Ana M Littlejohn MD   tamsulosin (FLOMAX) 0.4 mg capsule Take 0.4 mg by mouth daily. 7/30/19   Provider, Historical   folic acid (FOLVITE) 1 mg tablet TAKE 1 TABLET BY MOUTH EVERY DAY 7/20/20   Provider, Historical   augmented betamethasone dipropionate (DIPROLENE-AF) 0.05 % ointment Apply  to affected area as needed for Skin Irritation.     Provider, Historical     Current Facility-Administered Medications   Medication Dose Route Frequency    cyclobenzaprine (FLEXERIL) tablet 10 mg  10 mg Oral TID PRN    docusate sodium (COLACE) capsule 100 mg  100 mg Oral BID    [START ON 2/29/8078] folic acid (FOLVITE) tablet 1 mg  1 mg Oral DAILY    [START ON 3/21/2022] tamsulosin (FLOMAX) capsule 0.4 mg  0.4 mg Oral DAILY    sodium chloride (NS) flush 5-40 mL  5-40 mL IntraVENous Q8H    sodium chloride (NS) flush 5-40 mL  5-40 mL IntraVENous PRN    acetaminophen (TYLENOL) tablet 650 mg  650 mg Oral Q6H PRN    Or    acetaminophen (TYLENOL) suppository 650 mg  650 mg Rectal Q6H PRN    polyethylene glycol (MIRALAX) packet 17 g  17 g Oral DAILY PRN    ondansetron (ZOFRAN ODT) tablet 4 mg  4 mg Oral Q8H PRN    Or    ondansetron (ZOFRAN) injection 4 mg  4 mg IntraVENous Q6H PRN    [START ON 3/21/2022] enoxaparin (LOVENOX) injection 60 mg  60 mg SubCUTAneous Q12H    0.9% sodium chloride infusion  75 mL/hr IntraVENous CONTINUOUS    [START ON 3/21/2022] piperacillin-tazobactam (ZOSYN) 3.375 g in 0.9% sodium chloride (MBP/ADV) 100 mL MBP  3.375 g IntraVENous Q8H    [START ON 3/21/2022] vancomycin (VANCOCIN) 1,250 mg in 0.9% sodium chloride 250 mL (VIAL-MATE)  1,250 mg IntraVENous Q12H    oxyCODONE-acetaminophen (PERCOCET) 5-325 mg per tablet 1 Tablet  1 Tablet Oral Q4H PRN    morphine injection 2 mg  2 mg IntraVENous Q4H PRN    melatonin tablet 3 mg  3 mg Oral QHS PRN     Current Outpatient Medications   Medication Sig    furosemide (LASIX) 20 mg tablet TAKE 1 TABLET BY MOUTH DAILY AS NEEDED (FOR SWELLING (EDEMA). TAKE FOR 3 DAYS MAX).  cyclobenzaprine (FLEXERIL) 10 mg tablet Take 1 Tablet by mouth three (3) times daily as needed for Muscle Spasm(s).  docusate sodium (COLACE) 100 mg capsule Take 1 Capsule by mouth two (2) times a day.  cyanocobalamin, vitamin B-12, (VITAMIN B-12 PO) Take 2,500 mcg by mouth daily.  cetirizine (ZyrTEC) 10 mg tablet Take 10 mg by mouth as needed for Allergies.  valsartan-hydroCHLOROthiazide (DIOVAN-HCT) 160-12.5 mg per tablet TAKE 1 TABLET BY MOUTH EVERY DAY    tamsulosin (FLOMAX) 0.4 mg capsule Take 0.4 mg by mouth daily.  folic acid (FOLVITE) 1 mg tablet TAKE 1 TABLET BY MOUTH EVERY DAY    augmented betamethasone dipropionate (DIPROLENE-AF) 0.05 % ointment Apply  to affected area as needed for Skin Irritation. Allergies   Allergen Reactions    Suprax [Cefixime] Other (comments)     Pt states it paralyzed him  Tolerated cefazolin during admission 2/15/22        Review of Systems:  Pertinent items are noted in HPI.     Objective:     Patient Vitals for the past 8 hrs:   BP SpO2   03/20/22 2030 126/75 97 %   03/20/22 1930 126/71 97 %   03/20/22 1900 130/79 98 %   03/20/22 1845 -- 98 %   03/20/22 1830 -- 96 %   03/20/22 1815 -- 98 %   03/20/22 1800 -- 96 % 22 1745 -- 96 %   22 1743 -- 95 %   22 1730 (!) 125/55 93 %     Temp (24hrs), Av.4 °F (36.9 °C), Min:98.4 °F (36.9 °C), Max:98.4 °F (36.9 °C)        EXAM: GEN: Well appearing obese male in moderate distress  PSYCH:  AAO x 4  MUSC/NEURO: Lumbar spine incision with perincisional hyperemia. There is no streaking erythema. There are 2 distal areas of wound dehiscence that are superficial.  Probing these areas does not reveal deep dehiscence. There is dark brown drainage from this area representing likely hematoma/?septic hematoma. Drainage without odor. There is mild fluctuance perincisionally. Proximally there is a superficial dehiscence without drainage. BL LE: moderate edema. Nonpitting edema. CNT and soft BL. Very difficult neurological exam due to pain. LEFT: PF: 4+/5 DF: 4+/5 EHL/EFL: 5/5 QS/HS/HF: can not test.  Neg. Babinski. Neg ankle clonus. DTR: 1+. Right: PF: 4+/5 DF: 4+/5 EHL/EFL: 5/5 QS/HS/HF: can not test.  Neg. Babinski. Neg ankle clonus. DTR: 1+    BCR of BL LE.  +DP pulse. IMAGING:  HISTORY: L spine surgery in mid February/increased pain/wound discharge the     COMPARISON: None.     TECHNIQUE:   Noncontrast axial CT imaging of the lumbar spine was performed. Coronal and sagittal reconstructions were obtained. CT dose reduction was  achieved through the use of a standardized protocol tailored for this  examination and automatic exposure control for dose modulation.     FINDINGS:  Subcutaneous midline fluid overlies the posterior lumbar decompression levels,  with a few gas bubbles tracking down to the epidural space.     There is no vertebral fracture, subluxation, osteomyelitis or discitis.     T12-L1:  The spinal canal and neural foramina are nonstenotic.     L1-2:  Mild spinal stenosis. Diffuse disc bulge. Facet arthrosis. Patent  foramina.     L2-3:  Mild spinal stenosis. Diffuse disc bulge. Facet arthritis.  Patent  foramina.     L3-4:  Surgical level without osseous spinal stenosis.     L4-5:  Surgical level without osseous spinal stenosis.     L5-S1:  The spinal canal and neural foramina are nonstenotic. Facet arthrosis.     IMPRESSION  1. Subcutaneous fluid overlies the lumbar decompression levels with a few gas  bubbles tracking down to the epidural space. 2. Lumbar and lumbosacral degenerative disc disease as detailed. Data Review   Recent Results (from the past 24 hour(s))   CBC WITH AUTOMATED DIFF    Collection Time: 03/20/22  2:32 PM   Result Value Ref Range    WBC 13.5 (H) 4.1 - 11.1 K/uL    RBC 4.69 4.10 - 5.70 M/uL    HGB 12.0 (L) 12.1 - 17.0 g/dL    HCT 38.5 36.6 - 50.3 %    MCV 82.1 80.0 - 99.0 FL    MCH 25.6 (L) 26.0 - 34.0 PG    MCHC 31.2 30.0 - 36.5 g/dL    RDW 14.4 11.5 - 14.5 %    PLATELET 781 769 - 447 K/uL    MPV 9.5 8.9 - 12.9 FL    NRBC 0.0 0  WBC    ABSOLUTE NRBC 0.00 0.00 - 0.01 K/uL    NEUTROPHILS 68 32 - 75 %    LYMPHOCYTES 17 12 - 49 %    MONOCYTES 13 5 - 13 %    EOSINOPHILS 1 0 - 7 %    BASOPHILS 0 0 - 1 %    IMMATURE GRANULOCYTES 1 (H) 0.0 - 0.5 %    ABS. NEUTROPHILS 9.1 (H) 1.8 - 8.0 K/UL    ABS. LYMPHOCYTES 2.3 0.8 - 3.5 K/UL    ABS. MONOCYTES 1.8 (H) 0.0 - 1.0 K/UL    ABS. EOSINOPHILS 0.1 0.0 - 0.4 K/UL    ABS. BASOPHILS 0.1 0.0 - 0.1 K/UL    ABS. IMM. GRANS. 0.2 (H) 0.00 - 0.04 K/UL    DF AUTOMATED     METABOLIC PANEL, COMPREHENSIVE    Collection Time: 03/20/22  2:32 PM   Result Value Ref Range    Sodium 132 (L) 136 - 145 mmol/L    Potassium 4.2 3.5 - 5.1 mmol/L    Chloride 98 97 - 108 mmol/L    CO2 29 21 - 32 mmol/L    Anion gap 5 5 - 15 mmol/L    Glucose 89 65 - 100 mg/dL    BUN 15 6 - 20 MG/DL    Creatinine 0.96 0.70 - 1.30 MG/DL    BUN/Creatinine ratio 16 12 - 20      GFR est AA >60 >60 ml/min/1.73m2    GFR est non-AA >60 >60 ml/min/1.73m2    Calcium 9.2 8.5 - 10.1 MG/DL    Bilirubin, total 0.7 0.2 - 1.0 MG/DL    ALT (SGPT) 22 12 - 78 U/L    AST (SGOT) 17 15 - 37 U/L    Alk.  phosphatase 75 45 - 117 U/L Protein, total 7.8 6.4 - 8.2 g/dL    Albumin 2.6 (L) 3.5 - 5.0 g/dL    Globulin 5.2 (H) 2.0 - 4.0 g/dL    A-G Ratio 0.5 (L) 1.1 - 2.2     PROCALCITONIN    Collection Time: 03/20/22  2:32 PM   Result Value Ref Range    Procalcitonin 0.26 ng/mL   C REACTIVE PROTEIN, QT    Collection Time: 03/20/22  2:32 PM   Result Value Ref Range    C-Reactive protein 23.70 (H) 0.00 - 0.60 mg/dL   SED RATE (ESR)    Collection Time: 03/20/22  2:32 PM   Result Value Ref Range    Sed rate, automated 69 (H) 0 - 20 mm/hr         Assessment/Plan:   A: 1. S/P L2-L5 laminectomy  2. Draining lumbar wound  3. Lumbar fluid collection concerning for septic hematoma/seroma    P: 1. Admit to hospitalist service. Appreciate their admission and medical management. 2. IV antibiotics as I cultured the wound tonight- mixed fluid that represent likely septic hematoma. 3. NPO after midnight for I and D tomorrow with Dr. Molly Ruvalcaba   4. Hold anticoagulation. 5. SCDs  6. Will hand off case to primary inpatient/elective MD/NP team tomorrow am.  7. Orthopedics to follow. Discussed case with Dr. Molly Ruvalcaba who agrees with plan.     Javier Gomes [place]   Orthopaedic Surgery [place]  [address] [facility]

## 2022-03-21 NOTE — PROGRESS NOTES
Pharmacy Dosing Services: 3/20/22    Consult for Enoxaparin by Dr. Danyell Ware made for this 80 y.o. male, for prophylaxis of  VTE. Wt Readings from Last 1 Encounters:   03/20/22 (!) 176.9 kg (390 lb)       Ht Readings from Last 1 Encounters:   03/20/22 203.2 cm (80\")             Previous Dose Enoxaparin 40mg daily      Creatinine Clearance Estimated Creatinine Clearance: 130 mL/min (based on SCr of 0.96 mg/dL). Creatinine Lab Results   Component Value Date/Time    Creatinine 0.96 03/20/2022 02:32 PM         Platelet Lab Results   Component Value Date/Time    PLATELET 037 04/45/6278 02:32 PM        H/H Lab Results   Component Value Date/Time    HGB 12.0 (L) 03/20/2022 02:32 PM            Pharmacist made change to enoxaparin therapy based on:  [ X ] Weight: dose changed to: enoxaparin 60mg q12h for patient weight >175kg and CrCl >30mL/min    Pharmacy to automatically make dose adjustment for renal dysfunction (creatinine clearance less than 30 mL/min)  Pharmacy to make dose rounding adjustments per [facility] dose adjustment scale. Pharmacy to monitor patients progress. Will make dose adjustment as needed per changing renal function. Will communicate further recommendations regarding patients anticoagulation therapy with prescriber. Signed Gustavo Henley .  Contact information: [phone]

## 2022-03-21 NOTE — BRIEF OP NOTE
Brief Postoperative Note    Patient: Sam Abernathy. YOB: 1950  MRN: [id number]    Date of Procedure: 3/21/2022     Pre-Op Diagnosis: post op infection    Post-Op Diagnosis: Same as preoperative diagnosis.       Procedure(s):  SPINE INCISION AND DRAINAGE LUMBAR    Surgeon(s):  Kellee Gilbert MD    Surgical Assistant: Physician Assistant: NAOMI Coker    Anesthesia: General     Estimated Blood Loss (mL): less than 50     Complications: None    Specimens:   ID Type Source Tests Collected by Time Destination   1 : LUMBAR SUPERFICIAL SUBCUTANEOUS FLUID Wound Back AEROBIC/ANAEROBIC CULTURE, Uma Louis MD 3/21/2022 1700 Microbiology   2 : LUMBAR DEEP SPINE CULTURE Wound Back AEROBIC/ANAEROBIC CULTURE, Uma Louis MD 3/21/2022 1701 Microbiology        Implants: * No implants in log *    Drains:   Hemovac Back (Active)       Findings: as above    Electronically Signed by Yulia Velasquez MD on 3/21/2022 at 5:08 PM

## 2022-03-21 NOTE — ANESTHESIA PREPROCEDURE EVALUATION
Relevant Problems   RESPIRATORY SYSTEM   (+) JUDY on CPAP      CARDIOVASCULAR   (+) Benign essential HTN      ENDOCRINE   (+) Ankylosing spondylitis (HCC)   (+) Obesity, morbid (HCC)       Anesthetic History     Increased risk of difficult airway     Comments: Anticipate difficult airway     Review of Systems / Medical History  Patient summary reviewed and pertinent labs reviewed    Pulmonary        Sleep apnea: CPAP           Neuro/Psych             Comments: Lumbar radiculopathy with diffiuclty ambulating Cardiovascular    Hypertension              Exercise tolerance: >4 METS  Comments: Normal cardiac cath in Jan 2022   GI/Hepatic/Renal  Within defined limits              Endo/Other        Morbid obesity and arthritis    Comments: Ankylosing spondylitis Other Findings   Comments: Ankylosing spondylitis-very limited mobility            Physical Exam    Airway  Mallampati: III  TM Distance: < 4 cm  Neck ROM: decreased range of motion   Mouth opening: Normal    Comments: Extremely limited neck extension due to ankylosing spondylitis Cardiovascular    Rhythm: regular  Rate: normal         Dental    Dentition: Lower dentition intact and Upper dentition intact     Pulmonary  Breath sounds clear to auscultation               Abdominal  GI exam deferred       Other Findings            Anesthetic Plan    ASA: 4  Anesthesia type: general  Awake fiberoptic         Induction: Intravenous  Anesthetic plan and risks discussed with: Patient      Discussed awake intubation with patient due to anticipated difficult airway. Plan for sedation, Precedex, topical lidocaine and then awake FOI. Pt is agreeable.

## 2022-03-21 NOTE — PROGRESS NOTES
[facility]  [address] [facility], [address] [place] [address]  [phone]      Medical Progress Note      NAME: Sharla Randolph :  1950  MRM:  [id number]    Date of service: 3/21/2022  8:45 AM       Assessment and Plan:   1. Post surgical wound/ cellulitis/ partial wound dehiscience distally with purulent drainage. CT scan showed subcutaneous fluid overlies the lumbar decompression levels with few madeline bubbles tracking down to the epidural space. Elevated CRP in an immunocompromised patient on MTX and remicaid. Check wound culture and continue ABx., vanc and zosyn. Plan for I&D on 3/21. Pain management with IV dilaudid.      2.  Leucocytosis due to above. Check cultures and continue ABx.      3.  H/o Ankylosing spondylitis/ Rheumatoid arthritis. Hold home MTX     4.  Obesity. Would benefit from weight loss. Subjective:     Chief Complaint[de-identified] Patient was seen and examined as a follow up for wound dehiscence. Chart was reviewed. c/o severe back pain     ROS:  (bold if positive, if negative)    Tolerating PT  Tolerating Diet        Objective:     Last 24hrs VS reviewed since prior progress note.  Most recent are:    Visit Vitals  /68 (BP 1 Location: Left upper arm, BP Patient Position: At rest)   Pulse 74   Temp 98.5 °F (36.9 °C)   Resp 20   Ht 6' 8\" (2.032 m)   Wt (!) 176.9 kg (390 lb)   SpO2 95%   BMI 42.84 kg/m²     SpO2 Readings from Last 6 Encounters:   22 95%   22 95%   22 96%   22 96%   22 96%   21 97%            Intake/Output Summary (Last 24 hours) at 3/21/2022 0845  Last data filed at 3/20/2022 1927  Gross per 24 hour   Intake 1100 ml   Output --   Net 1100 ml        Physical Exam:    Gen:  obesity, in no acute distress  HEENT:  Pink conjunctivae, PERRL, hearing intact to voice, moist mucous membranes  Neck:  Supple, without masses, thyroid non-tender  Resp:  No accessory muscle use, clear breath sounds without wheezes rales or rhonchi  Card:  No murmurs, normal S1, S2 without thrills, bruits or peripheral edema  Abd:  Soft, non-tender, non-distended, normoactive bowel sounds are present, no palpable organomegaly and no detectable hernias  Lymph:  No cervical or inguinal adenopathy  Musc:  No cyanosis or clubbing  Skin:  No rashes or ulcers, skin turgor is good  Neuro:  Cranial nerves are grossly intact, no focal motor weakness, follows commands appropriately  Psych:  Good insight, oriented to person, place and time, alert  __________________________________________________________________  Medications Reviewed: (see below)  Medications:     Current Facility-Administered Medications   Medication Dose Route Frequency    cyclobenzaprine (FLEXERIL) tablet 10 mg  10 mg Oral TID PRN    docusate sodium (COLACE) capsule 100 mg  100 mg Oral BID    folic acid (FOLVITE) tablet 1 mg  1 mg Oral DAILY    tamsulosin (FLOMAX) capsule 0.4 mg  0.4 mg Oral DAILY    sodium chloride (NS) flush 5-40 mL  5-40 mL IntraVENous Q8H    sodium chloride (NS) flush 5-40 mL  5-40 mL IntraVENous PRN    acetaminophen (TYLENOL) tablet 650 mg  650 mg Oral Q6H PRN    Or    acetaminophen (TYLENOL) suppository 650 mg  650 mg Rectal Q6H PRN    polyethylene glycol (MIRALAX) packet 17 g  17 g Oral DAILY PRN    ondansetron (ZOFRAN ODT) tablet 4 mg  4 mg Oral Q8H PRN    Or    ondansetron (ZOFRAN) injection 4 mg  4 mg IntraVENous Q6H PRN    enoxaparin (LOVENOX) injection 60 mg  60 mg SubCUTAneous Q12H    0.9% sodium chloride infusion  75 mL/hr IntraVENous CONTINUOUS    piperacillin-tazobactam (ZOSYN) 3.375 g in 0.9% sodium chloride (MBP/ADV) 100 mL MBP  3.375 g IntraVENous Q8H    vancomycin (VANCOCIN) 1,250 mg in 0.9% sodium chloride 250 mL (VIAL-MATE)  1,250 mg IntraVENous Q12H    oxyCODONE-acetaminophen (PERCOCET) 5-325 mg per tablet 1 Tablet  1 Tablet Oral Q4H PRN    morphine injection 2 mg  2 mg IntraVENous Q4H PRN    melatonin tablet 3 mg  3 mg Oral QHS PRN        Lab Data Reviewed: (see below)  Lab Review:     Recent Labs     03/20/22  1432   WBC 13.5*   HGB 12.0*   HCT 38.5        Recent Labs     03/20/22  1432   *   K 4.2   CL 98   CO2 29   GLU 89   BUN 15   CREA 0.96   CA 9.2   ALB 2.6*   TBILI 0.7   ALT 22     No results found for: GLUCPOC  No results for input(s): PH, PCO2, PO2, HCO3, FIO2 in the last 72 hours. No results for input(s): INR, INREXT in the last 72 hours. All Micro Results     Procedure Component Value Units Date/Time    CULTURE, BLOOD [[id number]] Collected: 03/20/22 1432    Order Status: Completed Specimen: Blood Updated: 03/21/22 0542     Special Requests: NO SPECIAL REQUESTS        Culture result: NO GROWTH AFTER 13 HOURS       CULTURE, Dorla Sicilian STAIN [[id number]] Collected: 03/20/22 1855    Order Status: Completed Specimen: Wound Drainage Updated: 03/21/22 0018     Special Requests: NO SPECIAL REQUESTS        GRAM STAIN FEW WBCS SEEN               OCCASIONAL APPARENT GRAM NEGATIVE RODS           Culture result: PENDING    CULTURE, ANAEROBIC [[id number]] Collected: 03/20/22 1855    Order Status: Completed Specimen: Wound Drainage Updated: 03/20/22 2254    COVID-19 RAPID TEST [[id number]] Collected: 03/20/22 2146    Order Status: Completed Specimen: Nasopharyngeal Updated: 03/20/22 2209     Specimen source Nasopharyngeal        COVID-19 rapid test Not detected        Comment: Rapid Abbott ID Now       Rapid NAAT:  The specimen is NEGATIVE for SARS-CoV-2, the novel coronavirus associated with COVID-19. Negative results should be treated as presumptive and, if inconsistent with clinical signs and symptoms or necessary for patient management, should be tested with an alternative molecular assay. Negative results do not preclude SARS-CoV-2 infection and should not be used as the sole basis for patient management decisions.        This test has been authorized by the FDA under an Emergency Use Authorization (EUA) for use by authorized laboratories. Fact sheet for Healthcare Providers: ConventionUpdate.co.nz  Fact sheet for Patients: ConventionUpdate.co.nz       Methodology: Isothermal Nucleic Acid Amplification               I have reviewed notes of prior 24hr. Other pertinent lab: Total time spent with patient: 28 I personally reviewed chart, notes, data and current medications in the medical record. I have personally examined and treated the patient at bedside during this period.                  Care Plan discussed with: Patient, Nursing Staff and >50% of time spent in counseling and coordination of care    Discussed:  Care Plan    Prophylaxis:  Lovenox    Disposition:  Home w/Family           ___________________________________________________    Attending Physician: Evangelista Aleman MD

## 2022-03-22 LAB
ANION GAP SERPL CALC-SCNC: 5 MMOL/L (ref 5–15)
BACTERIA SPEC CULT: NORMAL
BASOPHILS # BLD: 0 K/UL (ref 0–0.1)
BASOPHILS NFR BLD: 0 % (ref 0–1)
BUN SERPL-MCNC: 14 MG/DL (ref 6–20)
BUN/CREAT SERPL: 18 (ref 12–20)
CALCIUM SERPL-MCNC: 8.5 MG/DL (ref 8.5–10.1)
CHLORIDE SERPL-SCNC: 103 MMOL/L (ref 97–108)
CO2 SERPL-SCNC: 26 MMOL/L (ref 21–32)
CREAT SERPL-MCNC: 0.76 MG/DL (ref 0.7–1.3)
DIFFERENTIAL METHOD BLD: ABNORMAL
EOSINOPHIL # BLD: 0 K/UL (ref 0–0.4)
EOSINOPHIL NFR BLD: 0 % (ref 0–7)
ERYTHROCYTE [DISTWIDTH] IN BLOOD BY AUTOMATED COUNT: 14.2 % (ref 11.5–14.5)
GLUCOSE SERPL-MCNC: 151 MG/DL (ref 65–100)
HCT VFR BLD AUTO: 36 % (ref 36.6–50.3)
HGB BLD-MCNC: 11.2 G/DL (ref 12.1–17)
IMM GRANULOCYTES # BLD AUTO: 0.1 K/UL (ref 0–0.04)
IMM GRANULOCYTES NFR BLD AUTO: 1 % (ref 0–0.5)
LYMPHOCYTES # BLD: 0.6 K/UL (ref 0.8–3.5)
LYMPHOCYTES NFR BLD: 8 % (ref 12–49)
MCH RBC QN AUTO: 25.3 PG (ref 26–34)
MCHC RBC AUTO-ENTMCNC: 31.1 G/DL (ref 30–36.5)
MCV RBC AUTO: 81.3 FL (ref 80–99)
MONOCYTES # BLD: 0.2 K/UL (ref 0–1)
MONOCYTES NFR BLD: 3 % (ref 5–13)
NEUTS SEG # BLD: 6.3 K/UL (ref 1.8–8)
NEUTS SEG NFR BLD: 88 % (ref 32–75)
NRBC # BLD: 0 K/UL (ref 0–0.01)
NRBC BLD-RTO: 0 PER 100 WBC
PLATELET # BLD AUTO: 271 K/UL (ref 150–400)
PMV BLD AUTO: 9.6 FL (ref 8.9–12.9)
POTASSIUM SERPL-SCNC: 4.8 MMOL/L (ref 3.5–5.1)
RBC # BLD AUTO: 4.43 M/UL (ref 4.1–5.7)
RBC MORPH BLD: ABNORMAL
SERVICE CMNT-IMP: NORMAL
SODIUM SERPL-SCNC: 134 MMOL/L (ref 136–145)
VANCOMYCIN SERPL-MCNC: 12.4 UG/ML
WBC # BLD AUTO: 7.2 K/UL (ref 4.1–11.1)

## 2022-03-22 PROCEDURE — 2709999900 HC NON-CHARGEABLE SUPPLY

## 2022-03-22 PROCEDURE — 74011250637 HC RX REV CODE- 250/637: Performed by: PHYSICIAN ASSISTANT

## 2022-03-22 PROCEDURE — 94761 N-INVAS EAR/PLS OXIMETRY MLT: CPT

## 2022-03-22 PROCEDURE — 74011250636 HC RX REV CODE- 250/636: Performed by: HOSPITALIST

## 2022-03-22 PROCEDURE — 85025 COMPLETE CBC W/AUTO DIFF WBC: CPT

## 2022-03-22 PROCEDURE — 77010033678 HC OXYGEN DAILY

## 2022-03-22 PROCEDURE — 97165 OT EVAL LOW COMPLEX 30 MIN: CPT

## 2022-03-22 PROCEDURE — 74011250637 HC RX REV CODE- 250/637: Performed by: NURSE PRACTITIONER

## 2022-03-22 PROCEDURE — 99221 1ST HOSP IP/OBS SF/LOW 40: CPT | Performed by: STUDENT IN AN ORGANIZED HEALTH CARE EDUCATION/TRAINING PROGRAM

## 2022-03-22 PROCEDURE — 97535 SELF CARE MNGMENT TRAINING: CPT

## 2022-03-22 PROCEDURE — 80202 ASSAY OF VANCOMYCIN: CPT

## 2022-03-22 PROCEDURE — 74011000250 HC RX REV CODE- 250: Performed by: PHYSICIAN ASSISTANT

## 2022-03-22 PROCEDURE — 74011250637 HC RX REV CODE- 250/637: Performed by: HOSPITALIST

## 2022-03-22 PROCEDURE — 97530 THERAPEUTIC ACTIVITIES: CPT

## 2022-03-22 PROCEDURE — 74011000258 HC RX REV CODE- 258: Performed by: HOSPITALIST

## 2022-03-22 PROCEDURE — 36415 COLL VENOUS BLD VENIPUNCTURE: CPT

## 2022-03-22 PROCEDURE — 97116 GAIT TRAINING THERAPY: CPT

## 2022-03-22 PROCEDURE — 74011250636 HC RX REV CODE- 250/636: Performed by: INTERNAL MEDICINE

## 2022-03-22 PROCEDURE — 80048 BASIC METABOLIC PNL TOTAL CA: CPT

## 2022-03-22 PROCEDURE — 74011000250 HC RX REV CODE- 250: Performed by: HOSPITALIST

## 2022-03-22 PROCEDURE — 65270000029 HC RM PRIVATE

## 2022-03-22 PROCEDURE — 97162 PT EVAL MOD COMPLEX 30 MIN: CPT

## 2022-03-22 RX ORDER — FACIAL-BODY WIPES
10 EACH TOPICAL DAILY PRN
Status: DISCONTINUED | OUTPATIENT
Start: 2022-03-24 | End: 2022-03-25 | Stop reason: HOSPADM

## 2022-03-22 RX ORDER — VANCOMYCIN 1.75 GRAM/500 ML IN 0.9 % SODIUM CHLORIDE INTRAVENOUS
1750 EVERY 12 HOURS
Status: DISCONTINUED | OUTPATIENT
Start: 2022-03-22 | End: 2022-03-24 | Stop reason: ALTCHOICE

## 2022-03-22 RX ORDER — HYDROMORPHONE HYDROCHLORIDE 1 MG/ML
0.5 INJECTION, SOLUTION INTRAMUSCULAR; INTRAVENOUS; SUBCUTANEOUS
Status: ACTIVE | OUTPATIENT
Start: 2022-03-22 | End: 2022-03-23

## 2022-03-22 RX ORDER — OXYCODONE HYDROCHLORIDE 5 MG/1
5 TABLET ORAL
Status: DISCONTINUED | OUTPATIENT
Start: 2022-03-22 | End: 2022-03-25 | Stop reason: HOSPADM

## 2022-03-22 RX ORDER — SODIUM CHLORIDE 0.9 % (FLUSH) 0.9 %
5-40 SYRINGE (ML) INJECTION EVERY 8 HOURS
Status: DISCONTINUED | OUTPATIENT
Start: 2022-03-22 | End: 2022-03-25 | Stop reason: HOSPADM

## 2022-03-22 RX ORDER — SIMETHICONE 80 MG
80 TABLET,CHEWABLE ORAL
Status: DISCONTINUED | OUTPATIENT
Start: 2022-03-22 | End: 2022-03-25 | Stop reason: HOSPADM

## 2022-03-22 RX ORDER — AMOXICILLIN 250 MG
1 CAPSULE ORAL 2 TIMES DAILY
Status: DISCONTINUED | OUTPATIENT
Start: 2022-03-22 | End: 2022-03-25 | Stop reason: HOSPADM

## 2022-03-22 RX ORDER — SODIUM CHLORIDE 9 MG/ML
75 INJECTION, SOLUTION INTRAVENOUS CONTINUOUS
Status: DISPENSED | OUTPATIENT
Start: 2022-03-22 | End: 2022-03-23

## 2022-03-22 RX ORDER — ZOLPIDEM TARTRATE 5 MG/1
5 TABLET ORAL
Status: DISCONTINUED | OUTPATIENT
Start: 2022-03-22 | End: 2022-03-25 | Stop reason: HOSPADM

## 2022-03-22 RX ORDER — OXYCODONE HYDROCHLORIDE 5 MG/1
10 TABLET ORAL
Status: DISCONTINUED | OUTPATIENT
Start: 2022-03-22 | End: 2022-03-25 | Stop reason: HOSPADM

## 2022-03-22 RX ORDER — DIPHENHYDRAMINE HYDROCHLORIDE 50 MG/ML
25 INJECTION, SOLUTION INTRAMUSCULAR; INTRAVENOUS
Status: DISCONTINUED | OUTPATIENT
Start: 2022-03-22 | End: 2022-03-25 | Stop reason: HOSPADM

## 2022-03-22 RX ORDER — POLYETHYLENE GLYCOL 3350 17 G/17G
17 POWDER, FOR SOLUTION ORAL DAILY
Status: DISCONTINUED | OUTPATIENT
Start: 2022-03-22 | End: 2022-03-25 | Stop reason: HOSPADM

## 2022-03-22 RX ORDER — SODIUM CHLORIDE 0.9 % (FLUSH) 0.9 %
5-40 SYRINGE (ML) INJECTION AS NEEDED
Status: DISCONTINUED | OUTPATIENT
Start: 2022-03-22 | End: 2022-03-25 | Stop reason: HOSPADM

## 2022-03-22 RX ORDER — NALOXONE HYDROCHLORIDE 0.4 MG/ML
0.4 INJECTION, SOLUTION INTRAMUSCULAR; INTRAVENOUS; SUBCUTANEOUS AS NEEDED
Status: DISCONTINUED | OUTPATIENT
Start: 2022-03-22 | End: 2022-03-25 | Stop reason: HOSPADM

## 2022-03-22 RX ADMIN — DOCUSATE SODIUM 50 MG AND SENNOSIDES 8.6 MG 1 TABLET: 8.6; 5 TABLET, FILM COATED ORAL at 08:36

## 2022-03-22 RX ADMIN — VANCOMYCIN HYDROCHLORIDE 1750 MG: 10 INJECTION, POWDER, LYOPHILIZED, FOR SOLUTION INTRAVENOUS at 21:18

## 2022-03-22 RX ADMIN — OXYCODONE HYDROCHLORIDE 10 MG: 5 TABLET ORAL at 15:36

## 2022-03-22 RX ADMIN — ZOLPIDEM TARTRATE 5 MG: 5 TABLET ORAL at 21:18

## 2022-03-22 RX ADMIN — Medication 10 ML: at 14:00

## 2022-03-22 RX ADMIN — PIPERACILLIN AND TAZOBACTAM 3.38 G: 3; .375 INJECTION, POWDER, LYOPHILIZED, FOR SOLUTION INTRAVENOUS at 21:01

## 2022-03-22 RX ADMIN — Medication 10 ML: at 21:02

## 2022-03-22 RX ADMIN — HYDROMORPHONE HYDROCHLORIDE 1 MG: 1 INJECTION, SOLUTION INTRAMUSCULAR; INTRAVENOUS; SUBCUTANEOUS at 00:38

## 2022-03-22 RX ADMIN — VANCOMYCIN HYDROCHLORIDE 1750 MG: 10 INJECTION, POWDER, LYOPHILIZED, FOR SOLUTION INTRAVENOUS at 09:41

## 2022-03-22 RX ADMIN — OXYCODONE HYDROCHLORIDE 10 MG: 5 TABLET ORAL at 08:36

## 2022-03-22 RX ADMIN — Medication 10 ML: at 05:51

## 2022-03-22 RX ADMIN — DOCUSATE SODIUM 50 MG AND SENNOSIDES 8.6 MG 1 TABLET: 8.6; 5 TABLET, FILM COATED ORAL at 18:00

## 2022-03-22 RX ADMIN — Medication 10 ML: at 05:54

## 2022-03-22 RX ADMIN — PIPERACILLIN AND TAZOBACTAM 3.38 G: 3; .375 INJECTION, POWDER, LYOPHILIZED, FOR SOLUTION INTRAVENOUS at 05:51

## 2022-03-22 RX ADMIN — Medication 10 ML: at 21:01

## 2022-03-22 RX ADMIN — TAMSULOSIN HYDROCHLORIDE 0.4 MG: 0.4 CAPSULE ORAL at 08:36

## 2022-03-22 RX ADMIN — SIMETHICONE 80 MG: 80 TABLET, CHEWABLE ORAL at 21:18

## 2022-03-22 RX ADMIN — PIPERACILLIN AND TAZOBACTAM 3.38 G: 3; .375 INJECTION, POWDER, LYOPHILIZED, FOR SOLUTION INTRAVENOUS at 13:37

## 2022-03-22 RX ADMIN — FOLIC ACID 1 MG: 1 TABLET ORAL at 08:36

## 2022-03-22 NOTE — PROGRESS NOTES
OCCUPATIONAL THERAPY EVALUATION/DISCHARGE  Patient: Henok Angel (70 y.o. male)  Date: 3/22/2022  Primary Diagnosis: Surgical wound infection [T81.49XA]  Procedure(s) (LRB):  SPINE INCISION AND DRAINAGE LUMBAR (N/A) 1 Day Post-Op   Precautions:   Back    ASSESSMENT  Based on the objective data described below, the patient presents with hospital admission secondary to partial wound dehiscience with drainage. Patient now POD 1 following I &D. Patient with lumbar surgery in February of this year. He reports doing well at home, performing ADLs and driving. Patient familiar with all precautions and performs tasks min assist to [address]. Patient uses AE at home for dressing/bathing and requires assist for tasks today secondary to AE not used. Patient left in chair at end of session in NAD, alarm set, eating lunch. Current Level of Function (ADLs/self-care): CGA    Functional Outcome Measure: The patient scored 75/100 on the Barthel INdex  outcome measure. Other factors to consider for discharge: none     PLAN :  Recommend with staff:     Recommendation for discharge: (in order for the patient to meet his/her long term goals)  No skilled occupational therapy/ follow up rehabilitation needs identified at this time. This discharge recommendation:  Has not yet been discussed the attending provider and/or case management    IF patient discharges home will need the following DME: none       SUBJECTIVE:   Patient stated Antonio Hilario can use that stuff to get dressed. I have no problems doing it.     OBJECTIVE DATA SUMMARY:   HISTORY:   Past Medical History:   Diagnosis Date    Abnormal stress test 12/2021    Cardiac cath done and normal 1/13/22    Ankylosing spondylitis ([facility] [address] 1977    Dr. Oliver Mckeon.  neck, lumbar. sees pain management    Benign essential HTN     BPH (benign prostatic hyperplasia)     Dr. Ana Maria Olvera.   PSA 0.3 4/15/21, 0.6 5/1/20    Chronic edema     compression stockings    Colon polyp     hx.  last colonoscopy 2019.  COVID-19 vaccine series completed     DJD (degenerative joint disease) of cervical spine     Dr. Terrence Howard (dyspnea on exertion)     History of basal cell cancer 2013    face- Dr. Rio Agarwal. MOHS    History of bilateral knee replacement     Dr. Dilshad Ron    History of DVT (deep vein thrombosis)     left leg.  Insomnia     takes ambien per Dr. Clemente Border Kidney stone     Lumbar spinal stenosis 2011    Dr [address], Dr. Real Able. MRI 9/2021, 9/2017. hx of injections, nerve ablation    Neuropathy     feet. due to spinal dz?  saw neurology. hx GTT    JUDY on CPAP     Dr. Carol Snell    Psoriasis     Rheumatoid arthritis of cervical spine (HCC)     Limited ROM    SOB (shortness of breath) 2014    Dr. Sophia Rodriguez abnormal stress. cath 2014 per pt     Past Surgical History:   Procedure Laterality Date    HX COLONOSCOPY  10/15/2019    Dr. [email or web address]. normal (report received). repeat 5 years    HX COLONOSCOPY      hx polyps    HX HEART CATHETERIZATION  2014    Dr Gerhard Hayward  01/03/2022    Dr. Nakul Bowman [facility] Bilateral     HX LITHOTRIPSY      HX [address] PROCEDURES  2013?     facial BCC    HX SHOULDER ARTHROSCOPY Left     HX WRIST FRACTURE TX Right        Prior Level of Function/Environment/Context:   Expanded or extensive additional review of patient history:   Home Situation  Home Environment: Private residence  # Steps to Enter: 4  Rails to Enter: Yes  Hand Rails : Bilateral  One/Two Story Residence: One story  Living Alone: Yes  Support Systems: Other Family Member(s),Friend/Neighbor (sister)  Patient Expects to be Discharged to[de-identified] Home  Current DME Used/Available at Home: Brace/Splint,Walker, rolling,Cane, straight,Shower chair,Commode, bedside    Hand dominance: Right    EXAMINATION OF PERFORMANCE DEFICITS:  Cognitive/Behavioral Status:  Neurologic State: Alert;Eyes open spontaneously  Orientation Level: Oriented X4  Cognition: Appropriate decision making; Follows commands; Appropriate safety awareness; Appropriate for age attention/concentration  Perception: Appears intact  Perseveration: No perseveration noted  Safety/Judgement: Awareness of environment    Skin: intact as seen    Edema: LEs    Hearing: Auditory  Auditory Impairment: Hard of hearing, left side  Hearing Aids/Status: Does not own    Vision/Perceptual:                                     Range of Motion:  UEs WFL                            Strength:  UE WFL                   Coordination:     Fine Motor Skills-Upper: Left Intact; Right Intact    Gross Motor Skills-Upper: Left Intact; Right Intact    Tone & Sensation:  Normal tone, sensation intact                            Balance:  Sitting: Intact  Standing: Intact; With support    Functional Mobility and Transfers for ADLs:  Bed Mobility:  Supine to Sit: Stand-by assistance  Scooting: Stand-by assistance    Transfers:  Sit to Stand: Contact guard assistance  Stand to Sit: Contact guard assistance  Bed to Chair: Contact guard assistance  Toilet Transfer : Contact guard assistance  Assistive Device : Walker, rolling    ADL Assessment:  Feeding: Independent    Oral Facial Hygiene/Grooming: Contact guard assistance    Bathing: Minimum assistance (uses AE at home)    Upper Body Dressing: Supervision    Lower Body Dressing: Minimum assistance (uses AE at home)    Toileting: Stand by assistance                ADL Intervention and task modifications:                                     Cognitive Retraining  Safety/Judgement: Awareness of environment    Therapeutic Exercise:     Functional Measure:    Barthel Index:  Bathin  Bladder: 10  Bowels: 10  Groomin  Dressin  Feeding: 10  Mobility: 10  Stairs: 5  Toilet Use: 10  Transfer (Bed to Chair and Back): 10  Total: 75/100      The Barthel ADL Index: Guidelines  1. The index should be used as a record of what a patient does, not as a record of what a patient could do.   2. The main aim is to establish degree of independence from any help, physical or verbal, however minor and for whatever reason. 3. The need for supervision renders the patient not independent. 4. A patient's performance should be established using the best available evidence. Asking the patient, friends/relatives and nurses are the usual sources, but direct observation and common sense are also important. However direct testing is not needed. 5. Usually the patient's performance over the preceding 24-48 hours is important, but occasionally longer periods will be relevant. 6. Middle categories imply that the patient supplies over 50 per cent of the effort. 7. Use of aids to be independent is allowed. Score Interpretation (from [address] [address])    Independent   60-79 Minimally independent   40-59 Partially dependent   20-39 Very dependent   <20 Totally dependent     -Ramon Sosa., Barthel, D.W. (1965). Functional evaluation: the Barthel Index. [address] [facility] ([address] (1997). The Barthel activities of daily living index: self-reporting versus actual performance in the old (> or = 75 years). Journal [address] [address]), [address] [facility], JROSITA, Kurt Oneal., Ruth Motley. (1999). Measuring the change in disability after inpatient rehabilitation; comparison of the responsiveness of the Barthel Index and Functional [place] Measure. Journal of Neurology, Neurosurgery, and Psychiatry, 66(4), 835-092. SOLOMON Eldridge.CHANDLER, ZION Flores.CAMILLE, & Joanna Stroud, MCyndyA. (2004) Assessment of post-stroke quality of life in cost-effectiveness studies: The usefulness of the Barthel Index and the EuroQoL-5D.  Quality of Life Research, 15, 228-07         Occupational Therapy Evaluation Charge Determination   History Examination Decision-Making   LOW Complexity : Brief history review  LOW Complexity : 1-3 performance deficits relating to physical, cognitive , or psychosocial skils that result in activity limitations and / or participation restrictions  LOW Complexity : No comorbidities that affect functional and no verbal or physical assistance needed to complete eval tasks       Based on the above components, the patient evaluation is determined to be of the following complexity level: LOW   Pain Rating:  3/10    Activity Tolerance:   Good    After treatment patient left in no apparent distress:    Sitting in chair, Call bell within reach and Bed / chair alarm activated    COMMUNICATION/EDUCATION:   The patients plan of care was discussed with: Physical therapist and Registered nurse.      Thank you for this referral.  Kapil Hauser OTR/L  Time Calculation: 40 mins

## 2022-03-22 NOTE — CONSULTS
Infectious Disease Consult Note    Reason for Consult: Lumbar SSI  Date of Consultation: March 22, 2022  Date of Admission: 3/20/2022  Referring Physician: Dr. Barry Heller      HPI:  Cyndi Alvarez. is a 80y.o. year old male with history of ankylosing spondylitis (was on Remicade until first week Feb 2022), reported psoriatic arthritis, L2, L3, L4, and L5 laminectomy, partial facetectomy, and foraminotomy on 2/15/22. Patient reports that the surgical wound healed completely after the surgery. He was ambulating and back to his normal activities. Sutures were removed about 3 weeks ago. Patient reports that he took the first shot of methotrexate IV on last Monday. In the days following, he started feeling little tired, also noticed the lumbar incision to breakdown and have clear discharge. He denies any chills or rigors. Reports possible subjective fevers. Sent for evaluation by orthopedics. So far the hospital course, he has been afebrile and hemodynamically stable. WBC 13.5 on arrival.  Lower aspect of the surgical incision was seen to have dehisced with some clear drainage. Specimens were sent from this drainage for cultures are growing beta-hemolytic group B streptococci and coagulase-negative staph species. Blood cultures from 3/20/2022 are negative so far. CT lumbar spine with contrast showed subcutaneous fluid overlying the lumbar decompression levels with a few gas bubbles tracking down to the epidural space. On 3/21/2022, patient underwent incision and drainage of the lumbar wound. Operative report was reviewed in detail. Currently, patient reports no acute complaints. Past Medical History:  Past Medical History:   Diagnosis Date    Abnormal stress test 12/2021    Cardiac cath done and normal 1/13/22    Ankylosing spondylitis ([facility] [address] 1977    Dr. Sana Chin.  neck, lumbar. sees pain management    Benign essential HTN     BPH (benign prostatic hyperplasia)     Dr. Lenard Santiago.   PSA 0.3 4/15/21, 0.6 5/1/20    Chronic edema     compression stockings    Colon polyp     hx.  last colonoscopy 2019.  COVID-19 vaccine series completed     DJD (degenerative joint disease) of cervical spine     Dr. Polo Thomas (dyspnea on exertion)     History of basal cell cancer 2013    face- Dr. Ismael Quintero. MOHS    History of bilateral knee replacement     Dr. Norma Pelaez    History of DVT (deep vein thrombosis)     left leg.  Insomnia     takes ambien per Dr. Alfredo Florence Kidney stone     Lumbar spinal stenosis 2011    Dr Nidia Boyd, Dr. Lavern Burkett. MRI 9/2021, 9/2017. hx of injections, nerve ablation    Neuropathy     feet. due to spinal dz?  saw neurology. hx GTT    JUDY on CPAP     Dr. Maricel Velásquez    Psoriasis     Rheumatoid arthritis of cervical spine (HCC)     Limited ROM    SOB (shortness of breath) 2014    Dr. Oli Sampson abnormal stress. cath 2014 per pt         Surgical History:  Past Surgical History:   Procedure Laterality Date    HX COLONOSCOPY  10/15/2019    Dr. Rey@[facility]. normal (report received). repeat 5 years    HX COLONOSCOPY      hx polyps    HX HEART CATHETERIZATION  2014    Dr Hooper Child  01/03/2022    Dr. Gurinder Barrera Bilateral     HX LITHOTRIPSY      HX [address] PROCEDURES  2013? facial BCC    HX SHOULDER ARTHROSCOPY Left     HX WRIST FRACTURE TX Right          Family History:   Family History   Problem Relation Age of Onset    Heart Disease Father     OSTEOARTHRITIS Father     OSTEOARTHRITIS Sister          Social History:     Patient does not smoke or drink. Allergies: Allergies   Allergen Reactions    Suprax [Cefixime] Other (comments)     Pt states it paralyzed him  Tolerated cefazolin during admission 2/15/22         Review of Systems:     Negative except as in HPI    Medications:  No current facility-administered medications on file prior to encounter.      Current Outpatient Medications on File Prior to Encounter   Medication Sig Dispense Refill    methotrexate, PF, 25 mg/mL injection every seven (7) days.  furosemide (LASIX) 20 mg tablet TAKE 1 TABLET BY MOUTH DAILY AS NEEDED (FOR SWELLING (EDEMA). TAKE FOR 3 DAYS MAX). 90 Tablet 0    cyclobenzaprine (FLEXERIL) 10 mg tablet Take 1 Tablet by mouth three (3) times daily as needed for Muscle Spasm(s). 30 Tablet 0    docusate sodium (COLACE) 100 mg capsule Take 1 Capsule by mouth two (2) times a day. 60 Capsule 0    cyanocobalamin, vitamin B-12, (VITAMIN B-12 PO) Take 2,500 mcg by mouth daily.  cetirizine (ZyrTEC) 10 mg tablet Take 10 mg by mouth as needed for Allergies.  valsartan-hydroCHLOROthiazide (DIOVAN-HCT) 160-12.5 mg per tablet TAKE 1 TABLET BY MOUTH EVERY DAY 90 Tablet 3    tamsulosin (FLOMAX) 0.4 mg capsule Take 0.4 mg by mouth daily.  folic acid (FOLVITE) 1 mg tablet TAKE 1 TABLET BY MOUTH EVERY DAY      augmented betamethasone dipropionate (DIPROLENE-AF) 0.05 % ointment Apply  to affected area as needed for Skin Irritation.              Physical Exam:    Vitals:   Patient Vitals for the past 24 hrs:   Temp Pulse Resp BP SpO2   03/22/22 0735 98.2 °F (36.8 °C) 68 20 121/66 93 %   03/22/22 0549 97.4 °F (36.3 °C) 66 18 (!) 142/81 97 %   03/21/22 2313 97.6 °F (36.4 °C) 75 18 130/78 93 %   03/21/22 2120 97.5 °F (36.4 °C) 68 18 (!) 141/74 98 %   03/21/22 2020 -- 64 14 113/67 99 %   03/21/22 2015 -- 66 13 121/85 97 %   03/21/22 2010 -- 66 9 114/65 95 %   03/21/22 2005 -- 64 11 121/71 96 %   03/21/22 2000 98.2 °F (36.8 °C) 83 17 118/79 91 %   03/21/22 1955 -- 75 15 124/64 94 %   03/21/22 1950 -- 64 11 124/68 98 %   03/21/22 1945 -- 65 9 111/85 96 %   03/21/22 1940 -- 71 15 -- 96 %   03/21/22 1935 -- 64 9 -- 97 %   03/21/22 1934 -- 68 16 133/60 99 %   03/21/22 1930 -- 65 11 120/61 98 %   03/21/22 1925 -- 71 9 130/67 99 %   03/21/22 1920 -- 71 11 118/71 100 %   03/21/22 1915 -- 76 20 130/66 99 %   03/21/22 1910 97.6 °F (36.4 °C) 70 12 118/64 98 %   03/21/22 1905 -- 67 12 118/64 100 %   03/21/22 1415 -- 82 -- (!) 141/64 --   03/21/22 1308 98.9 °F (37.2 °C) 80 12 131/60 99 %   03/21/22 1139 98.7 °F (37.1 °C) 74 18 124/74 94 %   ·   · GEN: NAD  · HEENT: Normocephalic, atraumatic, PERRL, no scleral icterus  · CV: S1, S2 heard regularly, no murmurs, thrills or rubs heard   · Lungs: Clear to auscultation bilaterally  · Abdomen: soft, non distended, non tender,  · Genitourinary:  no ramesh  · Extremities: no edema  · Neuro: Alert, oriented to time, place and situation, moves all extremities to commands, verbal   · Skin: no rash  · Psych: good affect, good eye contact, non tearful   · Lines: Peripheral IV lines      Labs:   Recent Results (from the past 24 hour(s))   CULTURE, BODY FLUID W GRAM STAIN    Collection Time: 03/21/22  5:00 PM    Specimen: Surgical Specimen    LUMBAR SUPERFICIAL SUBCUTANEOUS FLUID   Result Value Ref Range    Special Requests: NO SPECIAL REQUESTS      GRAM STAIN RARE WBCS SEEN      GRAM STAIN NO ORGANISMS SEEN      Culture result: PENDING    CULTURE, WOUND W GRAM STAIN    Collection Time: 03/21/22  5:01 PM    Specimen: Surgical Specimen    LUMBAR DEEO SPINE CULTURE   Result Value Ref Range    Special Requests: NO SPECIAL REQUESTS      GRAM STAIN FEW WBCS SEEN      GRAM STAIN NO ORGANISMS SEEN      Culture result: PENDING    CBC WITH AUTOMATED DIFF    Collection Time: 03/22/22  1:45 AM   Result Value Ref Range    WBC 7.2 4.1 - 11.1 K/uL    RBC 4.43 4.10 - 5.70 M/uL    HGB 11.2 (L) 12.1 - 17.0 g/dL    HCT 36.0 (L) 36.6 - 50.3 %    MCV 81.3 80.0 - 99.0 FL    MCH 25.3 (L) 26.0 - 34.0 PG    MCHC 31.1 30.0 - 36.5 g/dL    RDW 14.2 11.5 - 14.5 %    PLATELET 038 975 - 890 K/uL    MPV 9.6 8.9 - 12.9 FL    NRBC 0.0 0  WBC    ABSOLUTE NRBC 0.00 0.00 - 0.01 K/uL    NEUTROPHILS 88 (H) 32 - 75 %    LYMPHOCYTES 8 (L) 12 - 49 %    MONOCYTES 3 (L) 5 - 13 %    EOSINOPHILS 0 0 - 7 %    BASOPHILS 0 0 - 1 %    IMMATURE GRANULOCYTES 1 (H) 0.0 - 0.5 %    ABS.  NEUTROPHILS 6. 3 1.8 - 8.0 K/UL    ABS. LYMPHOCYTES 0.6 (L) 0.8 - 3.5 K/UL    ABS. MONOCYTES 0.2 0.0 - 1.0 K/UL    ABS. EOSINOPHILS 0.0 0.0 - 0.4 K/UL    ABS. BASOPHILS 0.0 0.0 - 0.1 K/UL    ABS. IMM. GRANS. 0.1 (H) 0.00 - 0.04 K/UL    DF SMEAR SCANNED      RBC COMMENTS NORMOCYTIC, NORMOCHROMIC     METABOLIC PANEL, BASIC    Collection Time: 03/22/22  1:45 AM   Result Value Ref Range    Sodium 134 (L) 136 - 145 mmol/L    Potassium 4.8 3.5 - 5.1 mmol/L    Chloride 103 97 - 108 mmol/L    CO2 26 21 - 32 mmol/L    Anion gap 5 5 - 15 mmol/L    Glucose 151 (H) 65 - 100 mg/dL    BUN 14 6 - 20 MG/DL    Creatinine 0.76 0.70 - 1.30 MG/DL    BUN/Creatinine ratio 18 12 - 20      GFR est AA >60 >60 ml/min/1.73m2    GFR est non-AA >60 >60 ml/min/1.73m2    Calcium 8.5 8.5 - 10.1 MG/DL   VANCOMYCIN, RANDOM    Collection Time: 03/22/22  4:00 AM   Result Value Ref Range    Vancomycin, random 12.4 UG/ML       Microbiology Data: In HPI        Assessment:   80y.o. year old moderately immunocompromised male with history of ankylosing spondylitis (was on Remicade until first week Feb 2022), reported psoriatic arthritis, L2, L3, L4, and L5 laminectomy, partial facetectomy, and foraminotomy on 2/15/22. Patient denies being on steroids recently. Admitted for surgical site infection. ower aspect of the surgical incision was seen to have dehisced with some clear drainage. Specimens were sent from this drainage for cultures are growing beta-hemolytic group B streptococci and coagulase-negative staph species. Blood cultures from 3/20/2022 are negative so far. CT lumbar spine with contrast showed subcutaneous fluid overlying the lumbar decompression levels with a few gas bubbles tracking down to the epidural space. On 3/21/2022, patient underwent incision and drainage of the lumbar wound. Operative report was reviewed in detail. Recommendations:  Continue vancomycin and Zosyn empiric for now.   Await operative cultures and cultures from 3/20/2022 until final.  Will ask micro lab to speciate the coagulase-negative staph species as well. Plan for about 6 weeks of IV antibiotics. Await blood cultures from 3/20/2022 to be final before placing PICC line. We will follow. Thank for the opportunity to participate in the care of this patient. Please contact with questions or concerns.            Maxine Badillo MD  Infectious Diseases

## 2022-03-22 NOTE — PROGRESS NOTES
[address] [place] [address], [id number] [facility]  [phone]      Medical Progress Note      NAME: Tatiana Al. :  1950  MRM:  [id number]    Date of service: 3/22/2022  8:45 AM       Assessment and Plan:   1. Post surgical wound/ cellulitis/ partial wound dehiscience distally with purulent drainage. CT scan showed subcutaneous fluid overlies the lumbar decompression levels with few madeline bubbles tracking down to the epidural space. Elevated CRP in an immunocompromised patient on MTX and remicaid. Check wound culture and continue ABx., vanc and zosyn. S/p I&D on 3/21. Pain management with IV dilaudid.      2.  Leucocytosis due to above. Better. Check cultures and continue ABx.      3.  H/o Ankylosing spondylitis/ Rheumatoid arthritis. Hold home MTX     4.  Obesity. Would benefit from weight loss. Subjective:     Chief Complaint[de-identified] Patient was seen and examined as a follow up for wound dehiscence. Chart was reviewed. back pain is better after I&D    ROS:  (bold if positive, if negative)    Tolerating PT  Tolerating Diet        Objective:     Last 24hrs VS reviewed since prior progress note.  Most recent are:    Visit Vitals  /66 (BP 1 Location: Left upper arm, BP Patient Position: At rest)   Pulse 68   Temp 98.2 °F (36.8 °C)   Resp 20   Ht 6' 8\" (2.032 m)   Wt (!) 176.9 kg (390 lb)   SpO2 93%   BMI 42.84 kg/m²     SpO2 Readings from Last 6 Encounters:   22 93%   22 95%   22 96%   22 96%   22 96%   21 97%    O2 Flow Rate (L/min): 2 l/min       Intake/Output Summary (Last 24 hours) at 3/22/2022 5889  Last data filed at 3/22/2022 0612  Gross per 24 hour   Intake 600 ml   Output 1470 ml   Net -870 ml        Physical Exam:    Gen:  obesity, in no acute distress  HEENT:  Pink conjunctivae, PERRL, hearing intact to voice, moist mucous membranes  Neck:  Supple, without masses, thyroid non-tender  Resp:  No accessory muscle use, clear breath sounds without wheezes rales or rhonchi  Card:  No murmurs, normal S1, S2 without thrills, bruits or peripheral edema  Abd:  Soft, non-tender, non-distended, normoactive bowel sounds are present, no palpable organomegaly and no detectable hernias  Lymph:  No cervical or inguinal adenopathy  Musc:  No cyanosis or clubbing  Skin:  No rashes or ulcers, skin turgor is good  Neuro:  Cranial nerves are grossly intact, no focal motor weakness, follows commands appropriately  Psych:  Good insight, oriented to person, place and time, alert  __________________________________________________________________  Medications Reviewed: (see below)  Medications:     Current Facility-Administered Medications   Medication Dose Route Frequency    0.9% sodium chloride infusion  75 mL/hr IntraVENous CONTINUOUS    sodium chloride (NS) flush 5-40 mL  5-40 mL IntraVENous Q8H    sodium chloride (NS) flush 5-40 mL  5-40 mL IntraVENous PRN    naloxone (NARCAN) injection 0.4 mg  0.4 mg IntraVENous PRN    senna-docusate (PERICOLACE) 8.6-50 mg per tablet 1 Tablet  1 Tablet Oral BID    polyethylene glycol (MIRALAX) packet 17 g  17 g Oral DAILY    [START ON 3/24/2022] bisacodyL (DULCOLAX) suppository 10 mg  10 mg Rectal DAILY PRN    diphenhydrAMINE (BENADRYL) injection 25 mg  25 mg IntraVENous Q6H PRN    oxyCODONE IR (ROXICODONE) tablet 5 mg  5 mg Oral Q3H PRN    oxyCODONE IR (ROXICODONE) tablet 10 mg  10 mg Oral Q3H PRN    HYDROmorphone (DILAUDID) syringe 0.5 mg  0.5 mg IntraVENous Q3H PRN    vancomycin (VANCOCIN) 1750 mg in  ml infusion  1,750 mg IntraVENous Q12H    cyclobenzaprine (FLEXERIL) tablet 10 mg  10 mg Oral TID PRN    folic acid (FOLVITE) tablet 1 mg  1 mg Oral DAILY    tamsulosin (FLOMAX) capsule 0.4 mg  0.4 mg Oral DAILY    sodium chloride (NS) flush 5-40 mL  5-40 mL IntraVENous Q8H    sodium chloride (NS) flush 5-40 mL  5-40 mL IntraVENous PRN    acetaminophen (TYLENOL) tablet 650 mg 650 mg Oral Q6H PRN    Or    acetaminophen (TYLENOL) suppository 650 mg  650 mg Rectal Q6H PRN    ondansetron (ZOFRAN ODT) tablet 4 mg  4 mg Oral Q8H PRN    Or    ondansetron (ZOFRAN) injection 4 mg  4 mg IntraVENous Q6H PRN    [Held by provider] enoxaparin (LOVENOX) injection 60 mg  60 mg SubCUTAneous Q12H    piperacillin-tazobactam (ZOSYN) 3.375 g in 0.9% sodium chloride (MBP/ADV) 100 mL MBP  3.375 g IntraVENous Q8H    melatonin tablet 3 mg  3 mg Oral QHS PRN        Lab Data Reviewed: (see below)  Lab Review:     Recent Labs     03/22/22  0145 03/20/22  1432   WBC 7.2 13.5*   HGB 11.2* 12.0*   HCT 36.0* 38.5    273     Recent Labs     03/22/22  0145 03/20/22  1432   * 132*   K 4.8 4.2    98   CO2 26 29   * 89   BUN 14 15   CREA 0.76 0.96   CA 8.5 9.2   ALB  --  2.6*   TBILI  --  0.7   ALT  --  22     No results found for: GLUCPOC  No results for input(s): PH, PCO2, PO2, HCO3, FIO2 in the last 72 hours. No results for input(s): INR, INREXT, INREXT in the last 72 hours.   All Micro Results     Procedure Component Value Units Date/Time    CULTURE, BLOOD [[id number]] Collected: 03/20/22 1432    Order Status: Completed Specimen: Blood Updated: 03/22/22 0654     Special Requests: NO SPECIAL REQUESTS        Culture result: NO GROWTH 2 DAYS       CULTURE, Meaghan Henjil STAIN [[id number]] Collected: 03/21/22 1701    Order Status: Completed Specimen: Surgical Specimen Updated: 03/21/22 2326     Special Requests: NO SPECIAL REQUESTS        GRAM STAIN FEW WBCS SEEN         NO ORGANISMS SEEN        Culture result: PENDING    CULTURE, BODY FLUID W Lynnda Graft [[id number]] Collected: 03/21/22 1700    Order Status: Completed Specimen: Surgical Specimen Updated: 03/21/22 2322     Special Requests: NO SPECIAL REQUESTS        GRAM STAIN RARE WBCS SEEN         NO ORGANISMS SEEN        Culture result: PENDING    CULTURE, ANAEROBIC [[id number]] Collected: 03/21/22 1700    Order Status: Completed Updated: 03/21/22 2124    CULTURE, ANAEROBIC [[id number]] Collected: 03/21/22 1701    Order Status: Completed Updated: 03/21/22 2124    CULTURE, Chalmer Rota STAIN [[id number]]  (Abnormal) Collected: 03/20/22 1855    Order Status: Completed Specimen: Wound Drainage Updated: 03/21/22 1442     Special Requests: NO SPECIAL REQUESTS        GRAM STAIN FEW WBCS SEEN               OCCASIONAL APPARENT GRAM NEGATIVE RODS           Culture result:       MODERATE POSSIBLE STREPTOCOCCUS SPECIES          CULTURE, ANAEROBIC [[id number]] Collected: 03/20/22 1855    Order Status: Completed Specimen: Wound Drainage Updated: 03/20/22 2254    COVID-19 RAPID TEST [[id number]] Collected: 03/20/22 2146    Order Status: Completed Specimen: Nasopharyngeal Updated: 03/20/22 2209     Specimen source Nasopharyngeal        COVID-19 rapid test Not detected        Comment: Rapid Abbott ID Now       Rapid NAAT:  The specimen is NEGATIVE for SARS-CoV-2, the novel coronavirus associated with COVID-19. Negative results should be treated as presumptive and, if inconsistent with clinical signs and symptoms or necessary for patient management, should be tested with an alternative molecular assay. Negative results do not preclude SARS-CoV-2 infection and should not be used as the sole basis for patient management decisions. This test has been authorized by the FDA under an Emergency Use Authorization (EUA) for use by authorized laboratories. Fact sheet for Healthcare Providers: ConventionUpdate.co.nz  Fact sheet for Patients: ConventionUpdate.co.nz       Methodology: Isothermal Nucleic Acid Amplification               I have reviewed notes of prior 24hr. Other pertinent lab: Total time spent with patient: 28 I personally reviewed chart, notes, data and current medications in the medical record. I have personally examined and treated the patient at bedside during this period.                  Care Plan discussed with: Patient, Nursing Staff and >50% of time spent in counseling and coordination of care    Discussed:  Care Plan    Prophylaxis:  Lovenox    Disposition:  Home w/Family           ___________________________________________________    Attending Physician: Diana Lozano MD

## 2022-03-22 NOTE — PERIOP NOTES
TRANSFER - OUT REPORT:    Verbal report given to Melinda GATICA(name) on [address]  being transferred to [id number]  (unit) for routine post - op       Report consisted of patients Situation, Background, Assessment and   Recommendations(SBAR). Information from the following report(s) SBAR, Kardex, Intake/Output, MAR, Recent Results and Cardiac Rhythm NSR was reviewed with the receiving nurse. Lines:   Peripheral IV 03/20/22 Left Antecubital (Active)   Site Assessment Clean, dry, & intact 03/21/22 2000   Phlebitis Assessment 0 03/21/22 2000   Infiltration Assessment 0 03/21/22 2000   Dressing Status Clean, dry, & intact 03/21/22 2000   Dressing Type Transparent 03/21/22 2000   Hub Color/Line Status Pink 03/21/22 2000   Action Taken Open ports on tubing capped 03/21/22 2000   Alcohol Cap Used Yes 03/21/22 2000       Peripheral IV 03/20/22 Right;Posterior Forearm (Active)   Site Assessment Clean, dry, & intact 03/21/22 2000   Phlebitis Assessment 0 03/21/22 2000   Infiltration Assessment 0 03/21/22 2000   Dressing Status Clean, dry, & intact 03/21/22 2000   Dressing Type Transparent 03/21/22 2000   Hub Color/Line Status Pink 03/21/22 2000   Action Taken Open ports on tubing capped 03/21/22 2000   Alcohol Cap Used Yes 03/21/22 2000        Opportunity for questions and clarification was provided.       Patient transported with:   O2 @ 2 liters  Registered Nurse

## 2022-03-22 NOTE — PROGRESS NOTES
[facility] Dosing Services: Antimicrobial Stewardship Daily Doc    Consult for antibiotic dosing of vancomycin by Dr. Gabriel Ruth  Indication: Harned Stable surgical wound/cellulitis with purulent drainage in setting of immunosuppression (methotrexate/infliximab)  Day of Therapy: 3    Ht Readings from Last 1 Encounters:   03/20/22 203.2 cm (80\")        Wt Readings from Last 1 Encounters:   03/20/22 (!) 176.9 kg (390 lb)        Vancomycin therapy:  Current maintenance dose: vancomycin 1250 mg IV every 12 hours     Dose calculated to approximate a           a. Target AUC/MENDEL of 400-600          b. Trough of N/A mcg/mL     Assessment/Plan:  SCr remains stable, leukocytosis resolved WBC = 7.2, procalcitonin low at 0.26, afebrile, wound growing moderate possible streptococcus species so far, urine output appears adequate  Vancomycin random level drawn this morning predicts a sub-therapeutic AUC of 316 based on [facility], dose of 7.1 mg/kg low as well (note: morbid obesity). Will adjust regimen to 1750 mg IV every 12 hours which predicts an AUC of 442 (dose = 9.9 mg/kg). Hesitate to start a q8h regimen in 80year old. Follow concomitant Zosyn/vancomycin for nephrotoxicity. Consider ordering true trough level tomorrow 3/23 prior to 2200 dose (4th dose of new regimen) due to administration times - may be too close for AM labs  BMP ordered daily by PA  Dose administration notes:   Doses given appropriately as scheduled    Date Dose & Interval Measured (mcg/mL) Extrapolated (mcg/mL)   ?3/22 at 0400 ?1250 mg IV q12h ?12.4 ? AUC = 316   ? ? ? ?   ? ? ? ?          Other Antimicrobial   (not dosed by pharmacist) Zosyn 3.375 g IV every 8 hours   Cultures 3/20 - Blood - NGTD x 2 days - Prelim  3/20 - Wound - Moderate possible streptococcus species - Prelim  3/20 - Wound, anaerobic - Pending  3/21 - Body fluid - Lumbar superficial subcutaneous fluid - Pending  3/21 - Body fluid, anaerboic - Pending  3/21 - Wound - Pending  3/21 - Wound, anaerobic - Pending   Serum Creatinine Lab Results   Component Value Date/Time    Creatinine 0.76 03/22/2022 01:45 AM         Creatinine Clearance Estimated Creatinine Clearance: 164.3 mL/min (based on SCr of 0.76 mg/dL). Temp Temp: 98.2 °F (36.8 °C)       WBC Lab Results   Component Value Date/Time    WBC 7.2 03/22/2022 01:45 AM        Procalcitonin Lab Results   Component Value Date/Time    Procalcitonin 0.26 03/20/2022 02:32 PM        For Antifungals, Metronidazole and Nafcillin: Lab Results   Component Value Date/Time    ALT (SGPT) 22 03/20/2022 02:32 PM    AST (SGOT) 17 03/20/2022 02:32 PM    Alk.  phosphatase 75 03/20/2022 02:32 PM    Bilirubin, total 0.7 03/20/2022 02:32 PM        Pharmacist Chaya Posadas, CHRISTOSD

## 2022-03-22 NOTE — PROGRESS NOTES
Problem: Mobility Impaired (Adult and Pediatric)  Goal: *Acute Goals and Plan of Care (Insert Text)  Description: FUNCTIONAL STATUS PRIOR TO ADMISSION: Patient was independent and active without use of DME.    HOME SUPPORT PRIOR TO ADMISSION: The patient lived alone with sister and friends to provide assistance. Physical Therapy Goals  Initiated 3/22/2022  1. Patient will move from supine to sit and sit to supine  in bed with modified independence within 7 day(s). 2.  Patient will transfer from bed to chair and chair to bed with modified independence using the least restrictive device within 7 day(s). 3.  Patient will perform sit to stand with modified independence within 7 day(s). 4.  Patient will ambulate with modified independence for 250 feet with the least restrictive device within 7 day(s). 5.  Patient will ascend/descend 4 stairs with 1 handrail(s) with minimal assistance/contact guard assist within 7 day(s). Outcome: Progressing Towards Goal      PHYSICAL THERAPY EVALUATION  Patient: Tatiana Al. (70 y.o. male)  Date: 3/22/2022  Primary Diagnosis: Surgical wound infection [T81.49XA]  Procedure(s) (LRB):  SPINE INCISION AND DRAINAGE LUMBAR (N/A) 1 Day Post-Op   Precautions:   Back    ASSESSMENT  Based on the objective data described below, the patient presents with hospital admission secondary to partial wound dehiscience with drainage. Patient now POD 1 following I &D. Patient with lumbar surgery in February of this year. He reports doing well at home, performing ADLs and driving. Patient familiar with all precautions and performs tasks min assist to [facility]. Supine to sit via log roll with SBA. Sit to stand with RW with CGA and verbal cues for hand placement and to avoid extreme trunk flexion. Gait of 60' tolerated well. Pt with good pain management and tolerance. Expect good progress. Placed in recliner in NAD. Likely no HHPT indicated.     Current Level of Function Impacting Discharge (mobility/balance): CGA/good with RW    Functional Outcome Measure: The patient scored 75/100 on the barthel outcome measure     Other factors to consider for discharge: per above; lives alone     Patient will benefit from skilled therapy intervention to address the above noted impairments. PLAN :  Recommendations and Planned Interventions: bed mobility training, transfer training, gait training, therapeutic exercises, neuromuscular re-education, edema management/control, patient and family training/education, and therapeutic activities      Frequency/Duration: Patient will be followed by physical therapy:  5 times a week to address goals. Recommendation for discharge: (in order for the patient to meet his/her long term goals)  No skilled physical therapy/ follow up rehabilitation needs identified at this time. This discharge recommendation:  Has been made in collaboration with the attending provider and/or case management    IF patient discharges home will need the following DME: has RW         SUBJECTIVE:   Patient stated It really doesn't hurt.     OBJECTIVE DATA SUMMARY:   HISTORY:    Past Medical History:   Diagnosis Date    Abnormal stress test 12/2021    Cardiac cath done and normal 1/13/22    Ankylosing spondylitis ([facility] [address] 1977    Dr. Lee Heck.  neck, lumbar. sees pain management    Benign essential HTN     BPH (benign prostatic hyperplasia)     Dr. Edd Monroy. PSA 0.3 4/15/21, 0.6 5/1/20    Chronic edema     compression stockings    Colon polyp     hx.  last colonoscopy 2019. COVID-19 vaccine series completed     DJD (degenerative joint disease) of cervical spine     Dr. Iraj Cuello (dyspnea on exertion)     History of basal cell cancer 2013    face- Dr. Sandrine Kaur. MOHS    History of bilateral knee replacement     Dr. Cheryle Peper    History of DVT (deep vein thrombosis)     left leg.       Insomnia     takes ambien per Dr. Lee Heck    Kidney stone     Lumbar spinal stenosis 2011     Magen Calderon, Dr. Delonte Florence. MRI 9/2021, 9/2017. hx of injections, nerve ablation    Neuropathy     feet. due to spinal dz?  saw neurology. hx GTT    JUDY on CPAP     Dr. Rj Trejo    Psoriasis     Rheumatoid arthritis of cervical spine (HCC)     Limited ROM    SOB (shortness of breath) 2014    Dr. Nellie Sharma abnormal stress. cath 2014 per pt     Past Surgical History:   Procedure Laterality Date    HX COLONOSCOPY  10/15/2019    Dr. Langston@[employer]. normal (report received). repeat 5 years    HX COLONOSCOPY      hx polyps    HX HEART CATHETERIZATION  2014    Dr Jimenez Terrell  01/03/2022    Dr. Pat Barrett Bilateral     HX LITHOTRIPSY      HX MOHS PROCEDURES  2013? facial BCC    HX SHOULDER ARTHROSCOPY Left     HX WRIST FRACTURE TX Right        Personal factors and/or comorbidities impacting plan of care: per above and below    Home Situation  Home Environment: Private residence  # Steps to Enter: 4  Rails to Enter: Yes  Hand Rails : Bilateral  One/Two Story Residence: One story  Living Alone: Yes  Support Systems: Other Family Member(s) (pt lives alone in pvt residence, at baseline pt is ambulatory w/ walking stick, iADLs, and drives )  Patient Expects to be Discharged to[de-identified] Home with home health  Current DME Used/Available at Home: Brace/Splint,Walker, rolling,Cane, straight,Shower chair,Commode, bedside    EXAMINATION/PRESENTATION/DECISION MAKING:   Critical Behavior:  Neurologic State: Alert,Eyes open spontaneously  Orientation Level: Oriented X4  Cognition: Appropriate decision making,Follows commands,Appropriate safety awareness,Appropriate for age attention/concentration  Safety/Judgement: Awareness of environment  Hearing:   Auditory  Auditory Impairment: Hard of hearing, left side  Hearing Aids/Status: Does not own  Skin:  IV; hemovac  Edema: lumbar region  Range Of Motion:  AROM: Within functional limits                       Strength:    Strength: Generally decreased, functional Tone & Sensation:   Tone: Normal              Sensation: Intact               Coordination:  Coordination: Within functional limits  Vision:      Functional Mobility:  Bed Mobility:     Supine to Sit: Stand-by assistance     Scooting: Stand-by assistance  Transfers:  Sit to Stand: Contact guard assistance  Stand to Sit: Contact guard assistance        Bed to Chair: Contact guard assistance              Balance:   Sitting: Intact  Standing: Intact; With support  Ambulation/Gait Training:  Distance (ft): 60 Feet (ft)  Assistive Device: Walker, rolling;Gait belt  Ambulation - Level of Assistance: Contact guard assistance                 Base of Support: Widened     Speed/Ginny: Slow;Pace decreased (<100 feet/min)  Step Length: Left shortened;Right shortened                    Functional Measure:  Barthel Index:    Bathin  Bladder: 10  Bowels: 10  Groomin  Dressin  Feeding: 10  Mobility: 10  Stairs: 5  Toilet Use: 10  Transfer (Bed to Chair and Back): 10  Total: 75/100       The Barthel ADL Index: Guidelines  1. The index should be used as a record of what a patient does, not as a record of what a patient could do. 2. The main aim is to establish degree of independence from any help, physical or verbal, however minor and for whatever reason. 3. The need for supervision renders the patient not independent. 4. A patient's performance should be established using the best available evidence. Asking the patient, friends/relatives and nurses are the usual sources, but direct observation and common sense are also important. However direct testing is not needed. 5. Usually the patient's performance over the preceding 24-48 hours is important, but occasionally longer periods will be relevant. 6. Middle categories imply that the patient supplies over 50 per cent of the effort. 7. Use of aids to be independent is allowed.     Score Interpretation (from [address] [address])    Independent   60-79 Minimally independent   40-59 Partially dependent   20-39 Very dependent   <20 Totally dependent     -Briana Sosa, Barthel, D.W. (9907). Functional evaluation: the Barthel Index. [address] ([address] (1997). The Barthel activities of daily living index: self-reporting versus actual performance in the old (> or = 75 years). Journal of [address] [place] [address] 45(7), 14 [facility], JROSITA, Cesar Brown., Cookie Quintero. (1999). Measuring the change in disability after inpatient rehabilitation; comparison of the responsiveness of the Barthel Index and Functional Phelps Measure. Journal of Neurology, Neurosurgery, and Psychiatry, 66(4), 231-464. JEAN Begum, MINNIE Flores, & Althea Muhammad M.A. (2004) Assessment of post-stroke quality of life in cost-effectiveness studies: The usefulness of the Barthel Index and the EuroQoL-5D. Quality of Life Research, 15, 209-48           Physical Therapy Evaluation Charge Determination   History Examination Presentation Decision-Making   MEDIUM  Complexity : 1-2 comorbidities / personal factors will impact the outcome/ POC  MEDIUM Complexity : 3 Standardized tests and measures addressing body structure, function, activity limitation and / or participation in recreation  MEDIUM Complexity : Evolving with changing characteristics  LOW Complexity : FOTO score of     barthel      Based on the above components, the patient evaluation is determined to be of the following complexity level: LOW     Pain Rating:  Well managed    Activity Tolerance:   Good    After treatment patient left in no apparent distress:   Sitting in chair, Heels elevated for pressure relief, Call bell within reach, and Bed / chair alarm activated    COMMUNICATION/EDUCATION:   The patients plan of care was discussed with: Occupational therapist, Registered nurse, and Case management.      Fall prevention education was provided and the patient/caregiver indicated understanding., Patient/family have participated as able in goal setting and plan of care. , and Patient/family agree to work toward stated goals and plan of care.     Thank you for this referral.  Edd Bermudez, PT   Time Calculation: 39 mins

## 2022-03-22 NOTE — ANESTHESIA POSTPROCEDURE EVALUATION
Procedure(s):  SPINE INCISION AND DRAINAGE LUMBAR. general    Anesthesia Post Evaluation      Multimodal analgesia: multimodal analgesia not used between 6 hours prior to anesthesia start to PACU discharge  Patient location during evaluation: PACU  Patient participation: complete - patient participated  Level of consciousness: awake  Pain management: adequate  Airway patency: patent  Anesthetic complications: no  Cardiovascular status: acceptable, blood pressure returned to baseline and hemodynamically stable  Respiratory status: acceptable  Hydration status: acceptable  Post anesthesia nausea and vomiting:  controlled      INITIAL Post-op Vital signs:   Vitals Value Taken Time   /79 03/21/22 2000   Temp 36.4 °C (97.6 °F) 03/21/22 1910   Pulse 65 03/21/22 2004   Resp 10 03/21/22 2004   SpO2 97 % 03/21/22 2004   Vitals shown include unvalidated device data.

## 2022-03-22 NOTE — PROGRESS NOTES
Ortho Spine    Per Dr. Thai Torres, patient may resume his home Ambien here in hospital. Order placed accordingly.     Tsering Roca NP

## 2022-03-22 NOTE — OP NOTES
[facility]  OPERATIVE REPORT    Name:  Brad Corbin  MR#:  [id number]  :  1950  ACCOUNT #:  [de-identified]  DATE OF SERVICE:  2022    PREOPERATIVE DIAGNOSIS:  Lumbar postoperative infection. POSTOPERATIVE DIAGNOSIS:  Lumbar postoperative infection. PROCEDURE PERFORMED:  Lumbar spine incision and drainage. SURGEON:  Lucio Lindsey MD    ASSISTANT:  NAOMI Coto    ANESTHESIA:  General.    COMPLICATIONS:  None. SPECIMENS REMOVED:  We obtained cultures of the superficial fluid and deep lumbar spine. IMPLANTS:  None. ESTIMATED BLOOD LOSS:  50 mL. INTRAOPERATIVE FINDINGS:  There was purulent fluid within the subcutaneous tissues. There was no obvious evidence of infection within the deep lumbar spine. INDICATIONS FOR PROCEDURE:  The patient is a very pleasant 73-year-old gentleman who underwent a lumbar laminectomy. He was doing well until he developed drainage from his lumbar incision. He was transferred to the emergency department via EMS on 2022. The ER workup was consistent with a postoperative infection. Consequently, the decision was made to proceed with operative intervention. He provided informed consent. PROCEDURE:  The patient was identified in the preoperative holding area. His lumbar spine was marked by me. He was transferred to the operating room where general anesthesia was given. He was also given his already scheduled Zofran. He was placed supine on the Gaby Purvis table. All bony prominences were well-padded. The lumbar spine was prepped and draped in the usual standard fashion. We performed a surgical time-out. I reopened up his lumbar wound from his previous surgery. There was a collection of purulent fluid superficial to the fascia within the subcutaneous tissues. The soft tissues were viable. We obtained intraoperative cultures of the superficial fluid.   We also opened up the deep spine to expose the laminectomy site. There was no evidence of purulent fluid within the deep spine. All the soft tissues were viable. I copiously irrigated the entire wound with pulse lavage using dilute Betadine solution. I also used a dilute chlorhexidine solution to irrigate the entire wound. I then followed with a plain saline wash of the entire wound. The soft tissues were injected with a pain management cocktail. We had good hemostasis. A deep drain was placed. I placed vancomycin powder. The wound was closed in multiple layers. A sterile dressing was applied. He was then moved to the supine position. He was extubated and transferred to the recovery room in good medical condition. I, Dr. Jennifer Wei, performed the above procedure.       Zhen Bojorquez MD      [id number]  D:  03/21/2022 17:13  T:  03/21/2022 20:20  JOB #:  [id number]

## 2022-03-22 NOTE — PROGRESS NOTES
3/22/2022  2:29 PM  CM completed assessment w/ pt in person, CM wore mask at all times. Charted demographics verified, pt lives alone in a 1 story home, w/ 4 entry steps at baseline pt is ambulatory w/ a walking stick, he reports to be independent w/ his ADLs and drives. Pt denies falls, his sister Maria Dolores Dubon (ИРИНА) [phone] can assist him. Reason for Admission:  Emergent for Surgical Wound Infection  Pt is a 79 yo  male who presents to [facility] c/o fever, shakes and chills. Pt had lumbar surgery 2/12 and had been recovering well w/ [facility] services when he developed symptoms. PMHx:   Date    Abnormal stress test 12/2021     Cardiac cath done and normal 1/13/22    Ankylosing spondylitis ([facility] [address] 1977     Dr. Flip Cordova.  neck, lumbar. sees pain management    Benign essential HTN      BPH (benign prostatic hyperplasia)       Dr. Gill Oliveros. PSA 0.3 4/15/21, 0.6 5/1/20    Chronic edema       compression stockings    Colon polyp       hx.  last colonoscopy 2019.  COVID-19 vaccine series completed      DJD (degenerative joint disease) of cervical spine       Dr. Patricia Malcolm (dyspnea on exertion)      History of basal cell cancer 2013     face- Dr. Kiesha Jaimes. MOHS    History of bilateral knee replacement       Dr. Quang Lai    History of DVT (deep vein thrombosis)       left leg.  Insomnia       takes ambien per Dr. Hassan Schirmer Kidney stone      Lumbar spinal stenosis 2011     Dr Robi Amador, Dr. Rose Heimlich. MRI 9/2021, 9/2017. hx of injections, nerve ablation    Neuropathy       feet. due to spinal dz?  saw neurology. hx GTT    JUDY on CPAP       Dr. Silvano Merlos Psoriasis      Rheumatoid arthritis of cervical spine (HCC)       Limited ROM    SOB (shortness of breath) 2014     Dr. Hawk Johnson abnormal stress. cath 2014 per pt                        RUR Score:  10 % Low Risk of Readmission?  [address] for utilizing home health:  PT,OT  evals completed, pt is open to [facility] for PT  DME: walking stick, RW, grabbers  Rx: ANTHONY, pt uses [facility], fully covered for his medications  COVID Vax Hx: Willard Salmon fully vaccinated w/ booster 10/20/21    PCP: First and Last name:  Jules Anderson MD     Name of Practice:    Are you a current patient: Yes/No: yes    Approximate date of last visit: 2021   Can you participate in a virtual visit with your PCP: yes                    Current Advanced Directive/Advance Care Plan: Full Code  [facility] Elyse Loyd [phone]    Healthcare Decision Maker:   Click here to complete [address] including selection of the Healthcare Decision Maker Relationship (ie \"Primary\")                             Transition of Care Plan:                    RUR 10 % Low Risk of Readmission  LOS 2 Days  1. Hospital admission for medical management   2. Ortho consult  3. PT, OT evals, orders and clinicals sent to [facility] in [employer]  4. CM to follow through for treatment/response  5. Spiritual Care consult to complete AMD  6. DC when stable to home w/ family assistance and HH   6. Outpatient f/u PCP, ortho  7. Family will transport at [address] [place] [address] Management Interventions  PCP Verified by CM:  Yes Charlette Boo MD, last visit 2021)  Palliative Care Criteria Met (RRAT>21 & CHF Dx)?: No  Mode of Transport at Discharge: Self (sister )  Physical Therapy Consult: Yes  Occupational Therapy Consult: Yes  Support Systems: Other Family Member(s) (pt lives alone in t residence, at baseline pt is ambulatory w/ walking stick, iADLs, and drives )  Confirm Follow Up Transport: Family (sister)  Discharge Location  Patient Expects to be Discharged to[de-identified] Home with home health  PATY Leiva

## 2022-03-22 NOTE — PROGRESS NOTES
ORTHOPAEDIC SPINE SURGERY PROGRESS NOTE    NAME:     Cydney Arndt. :       1950   MRN:       [id number]   DATE:      3/22/2022    POD:    1 Day Post-Op  S/P:    Procedure(s):  SPINE INCISION AND DRAINAGE LUMBAR    SUBJECTIVE:    C/O back pain along surgical incision  No leg pain or numbness  Denies nausea/vomiting, headache, chest pain or shortness of breath  Pain controlled    Currently on IV zosyn and vanc    Recent Labs     22  0145   HGB 11.2*   HCT 36.0*   *   K 4.8      CO2 26   BUN 14   CREA 0.76   *     Patient Vitals for the past 12 hrs:   BP Temp Pulse Resp SpO2   22 1056 132/69 97.7 °F (36.5 °C) 63 18 96 %   22 0735 121/66 98.2 °F (36.8 °C) 68 20 93 %   22 0549 (!) 142/81 97.4 °F (36.3 °C) 66 18 97 %       EXAM:  Dressings clean, dry and intact   Positive strength/ROM bilat lower ext.   Neuro intact to sensation  Calves, soft & nontender  BL LEs NVID  Hemovac drain       PLAN:  Continue prn PO pain medications  Monitor hemovac drain output  PT/OT, OOB w/ assist  Advance diet as tolerated  Infectious Disease consulted for antibiotic mgmt      Yanelis Kim [place]  Orthopaedic Surgery  Physician Assistant to Dr. Miguel [place]

## 2022-03-22 NOTE — PROGRESS NOTES
Problem: Falls - Risk of  Goal: *Absence of Falls  Description: Document Yossi Ozuna Fall Risk and appropriate interventions in the flowsheet. Outcome: Progressing Towards Goal  Note: Fall Risk Interventions:  Mobility Interventions: Bed/chair exit alarm,Communicate number of staff needed for ambulation/transfer         Medication Interventions: Bed/chair exit alarm    Elimination Interventions: Call light in reach              Problem: Patient Education: Go to Patient Education Activity  Goal: Patient/Family Education  Outcome: Progressing Towards Goal     Problem: Pressure Injury - Risk of  Goal: *Prevention of pressure injury  Description: Document Cooper Scale and appropriate interventions in the flowsheet.   Outcome: Progressing Towards Goal  Note: Pressure Injury Interventions:  Sensory Interventions: Assess changes in LOC,Check visual cues for pain         Activity Interventions: Pressure redistribution bed/mattress(bed type),PT/OT evaluation    Mobility Interventions: HOB 30 degrees or less,Float heels    Nutrition Interventions: Document food/fluid/supplement intake    Friction and Shear Interventions: HOB 30 degrees or less

## 2022-03-22 NOTE — PROGRESS NOTES
Bedside, Verbal and Written shift change report given to Lucero RN (oncoming nurse) by Miriam Dela Cruz RN (offgoing nurse). Report included the following information SBAR, Kardex, ED Summary, Intake/Output, MAR, Recent Results and Med Rec Status.

## 2022-03-23 LAB
ANION GAP SERPL CALC-SCNC: 4 MMOL/L (ref 5–15)
BACTERIA SPEC CULT: ABNORMAL
BACTERIA SPEC CULT: NORMAL
BACTERIA SPEC CULT: NORMAL
BASOPHILS # BLD: 0 K/UL (ref 0–0.1)
BASOPHILS NFR BLD: 0 % (ref 0–1)
BUN SERPL-MCNC: 17 MG/DL (ref 6–20)
BUN/CREAT SERPL: 22 (ref 12–20)
CALCIUM SERPL-MCNC: 8.5 MG/DL (ref 8.5–10.1)
CHLORIDE SERPL-SCNC: 105 MMOL/L (ref 97–108)
CO2 SERPL-SCNC: 29 MMOL/L (ref 21–32)
CREAT SERPL-MCNC: 0.77 MG/DL (ref 0.7–1.3)
DIFFERENTIAL METHOD BLD: ABNORMAL
EOSINOPHIL # BLD: 0 K/UL (ref 0–0.4)
EOSINOPHIL NFR BLD: 0 % (ref 0–7)
ERYTHROCYTE [DISTWIDTH] IN BLOOD BY AUTOMATED COUNT: 14 % (ref 11.5–14.5)
GLUCOSE SERPL-MCNC: 118 MG/DL (ref 65–100)
GRAM STN SPEC: ABNORMAL
HCT VFR BLD AUTO: 34.4 % (ref 36.6–50.3)
HGB BLD-MCNC: 10.7 G/DL (ref 12.1–17)
IMM GRANULOCYTES # BLD AUTO: 0.1 K/UL (ref 0–0.04)
IMM GRANULOCYTES NFR BLD AUTO: 1 % (ref 0–0.5)
LYMPHOCYTES # BLD: 2.1 K/UL (ref 0.8–3.5)
LYMPHOCYTES NFR BLD: 26 % (ref 12–49)
MAGNESIUM SERPL-MCNC: 2.2 MG/DL (ref 1.6–2.4)
MCH RBC QN AUTO: 25.2 PG (ref 26–34)
MCHC RBC AUTO-ENTMCNC: 31.1 G/DL (ref 30–36.5)
MCV RBC AUTO: 80.9 FL (ref 80–99)
MONOCYTES # BLD: 1 K/UL (ref 0–1)
MONOCYTES NFR BLD: 12 % (ref 5–13)
NEUTS SEG # BLD: 5.1 K/UL (ref 1.8–8)
NEUTS SEG NFR BLD: 61 % (ref 32–75)
NRBC # BLD: 0 K/UL (ref 0–0.01)
NRBC BLD-RTO: 0 PER 100 WBC
PHOSPHATE SERPL-MCNC: 2.2 MG/DL (ref 2.6–4.7)
PLATELET # BLD AUTO: 351 K/UL (ref 150–400)
PMV BLD AUTO: 9.2 FL (ref 8.9–12.9)
POTASSIUM SERPL-SCNC: 4.2 MMOL/L (ref 3.5–5.1)
RBC # BLD AUTO: 4.25 M/UL (ref 4.1–5.7)
SERVICE CMNT-IMP: ABNORMAL
SERVICE CMNT-IMP: ABNORMAL
SERVICE CMNT-IMP: NORMAL
SERVICE CMNT-IMP: NORMAL
SODIUM SERPL-SCNC: 138 MMOL/L (ref 136–145)
VANCOMYCIN SERPL-MCNC: 17.4 UG/ML
WBC # BLD AUTO: 8.3 K/UL (ref 4.1–11.1)

## 2022-03-23 PROCEDURE — 85025 COMPLETE CBC W/AUTO DIFF WBC: CPT

## 2022-03-23 PROCEDURE — 65270000029 HC RM PRIVATE

## 2022-03-23 PROCEDURE — 83735 ASSAY OF MAGNESIUM: CPT

## 2022-03-23 PROCEDURE — 74011250637 HC RX REV CODE- 250/637: Performed by: PHYSICIAN ASSISTANT

## 2022-03-23 PROCEDURE — 74011000250 HC RX REV CODE- 250: Performed by: HOSPITALIST

## 2022-03-23 PROCEDURE — 84100 ASSAY OF PHOSPHORUS: CPT

## 2022-03-23 PROCEDURE — 74011000250 HC RX REV CODE- 250: Performed by: PHYSICIAN ASSISTANT

## 2022-03-23 PROCEDURE — 97116 GAIT TRAINING THERAPY: CPT

## 2022-03-23 PROCEDURE — 74011250636 HC RX REV CODE- 250/636: Performed by: INTERNAL MEDICINE

## 2022-03-23 PROCEDURE — 74011250637 HC RX REV CODE- 250/637: Performed by: NURSE PRACTITIONER

## 2022-03-23 PROCEDURE — 80048 BASIC METABOLIC PNL TOTAL CA: CPT

## 2022-03-23 PROCEDURE — 74011000258 HC RX REV CODE- 258: Performed by: HOSPITALIST

## 2022-03-23 PROCEDURE — 74011250637 HC RX REV CODE- 250/637: Performed by: INTERNAL MEDICINE

## 2022-03-23 PROCEDURE — 74011250637 HC RX REV CODE- 250/637: Performed by: HOSPITALIST

## 2022-03-23 PROCEDURE — 80202 ASSAY OF VANCOMYCIN: CPT

## 2022-03-23 PROCEDURE — 36415 COLL VENOUS BLD VENIPUNCTURE: CPT

## 2022-03-23 PROCEDURE — 74011000250 HC RX REV CODE- 250: Performed by: INTERNAL MEDICINE

## 2022-03-23 PROCEDURE — 97530 THERAPEUTIC ACTIVITIES: CPT

## 2022-03-23 PROCEDURE — 74011250636 HC RX REV CODE- 250/636: Performed by: HOSPITALIST

## 2022-03-23 RX ORDER — FAMOTIDINE 20 MG/1
20 TABLET, FILM COATED ORAL 2 TIMES DAILY
Status: DISCONTINUED | OUTPATIENT
Start: 2022-03-23 | End: 2022-03-25 | Stop reason: HOSPADM

## 2022-03-23 RX ORDER — MAG HYDROX/ALUMINUM HYD/SIMETH 200-200-20
30 SUSPENSION, ORAL (FINAL DOSE FORM) ORAL
Status: DISCONTINUED | OUTPATIENT
Start: 2022-03-23 | End: 2022-03-25 | Stop reason: HOSPADM

## 2022-03-23 RX ADMIN — CYCLOBENZAPRINE 10 MG: 10 TABLET, FILM COATED ORAL at 09:50

## 2022-03-23 RX ADMIN — ALUMINUM HYDROXIDE, MAGNESIUM HYDROXIDE, AND SIMETHICONE 30 ML: 200; 200; 20 SUSPENSION ORAL at 17:11

## 2022-03-23 RX ADMIN — POLYETHYLENE GLYCOL 3350 17 G: 17 POWDER, FOR SOLUTION ORAL at 09:50

## 2022-03-23 RX ADMIN — Medication 10 ML: at 03:00

## 2022-03-23 RX ADMIN — CYCLOBENZAPRINE 10 MG: 10 TABLET, FILM COATED ORAL at 18:44

## 2022-03-23 RX ADMIN — FAMOTIDINE 20 MG: 20 TABLET, FILM COATED ORAL at 17:11

## 2022-03-23 RX ADMIN — TAMSULOSIN HYDROCHLORIDE 0.4 MG: 0.4 CAPSULE ORAL at 09:49

## 2022-03-23 RX ADMIN — OXYCODONE 5 MG: 5 TABLET ORAL at 09:50

## 2022-03-23 RX ADMIN — ZOLPIDEM TARTRATE 5 MG: 5 TABLET ORAL at 21:29

## 2022-03-23 RX ADMIN — Medication 10 ML: at 22:12

## 2022-03-23 RX ADMIN — Medication 3 MG: at 21:28

## 2022-03-23 RX ADMIN — PIPERACILLIN AND TAZOBACTAM 3.38 G: 3; .375 INJECTION, POWDER, LYOPHILIZED, FOR SOLUTION INTRAVENOUS at 14:56

## 2022-03-23 RX ADMIN — OXYCODONE 5 MG: 5 TABLET ORAL at 18:44

## 2022-03-23 RX ADMIN — Medication 10 ML: at 22:13

## 2022-03-23 RX ADMIN — VANCOMYCIN HYDROCHLORIDE 1750 MG: 10 INJECTION, POWDER, LYOPHILIZED, FOR SOLUTION INTRAVENOUS at 10:44

## 2022-03-23 RX ADMIN — ALUMINUM HYDROXIDE, MAGNESIUM HYDROXIDE, AND SIMETHICONE 30 ML: 200; 200; 20 SUSPENSION ORAL at 09:51

## 2022-03-23 RX ADMIN — ALUMINUM HYDROXIDE, MAGNESIUM HYDROXIDE, AND SIMETHICONE 30 ML: 200; 200; 20 SUSPENSION ORAL at 21:28

## 2022-03-23 RX ADMIN — PIPERACILLIN AND TAZOBACTAM 3.38 G: 3; .375 INJECTION, POWDER, LYOPHILIZED, FOR SOLUTION INTRAVENOUS at 21:28

## 2022-03-23 RX ADMIN — PIPERACILLIN AND TAZOBACTAM 3.38 G: 3; .375 INJECTION, POWDER, LYOPHILIZED, FOR SOLUTION INTRAVENOUS at 05:17

## 2022-03-23 RX ADMIN — SIMETHICONE 80 MG: 80 TABLET, CHEWABLE ORAL at 02:59

## 2022-03-23 RX ADMIN — FOLIC ACID 1 MG: 1 TABLET ORAL at 09:49

## 2022-03-23 RX ADMIN — SODIUM PHOSPHATE, MONOBASIC, MONOHYDRATE: 276; 142 INJECTION, SOLUTION INTRAVENOUS at 09:52

## 2022-03-23 RX ADMIN — DOCUSATE SODIUM 50 MG AND SENNOSIDES 8.6 MG 1 TABLET: 8.6; 5 TABLET, FILM COATED ORAL at 09:49

## 2022-03-23 RX ADMIN — FAMOTIDINE 20 MG: 20 TABLET, FILM COATED ORAL at 10:44

## 2022-03-23 RX ADMIN — DOCUSATE SODIUM 50 MG AND SENNOSIDES 8.6 MG 1 TABLET: 8.6; 5 TABLET, FILM COATED ORAL at 17:11

## 2022-03-23 NOTE — PROGRESS NOTES
Bedside, Verbal and Written shift change report given to [address] [place] [address] (oncoming nurse) by Nirali Hernandez (offgoing nurse). Report included the following information SBAR, Kardex, ED Summary, Procedure Summary, Intake/Output, MAR, Recent Results and Med Rec Status.

## 2022-03-23 NOTE — PROGRESS NOTES
3/23/2022   CARE MANAGEMENT NOTE:  CM reviewed EMR and handoff received from previous  Cristian Churchill). Pt was admitted with post surgical wound/cellulitis/partial wound dehiscience. Reportedly, pt resides alone. Sister Diony Delgado ([phone]) is a primary family contact. RUR 9%    Transition Plan of Care:  1. Ortho, ID following for medical management  2. PT/OT evals complete; pt ambulated 60 feet on 3/22. [facility] HH (PT, OT) has been arranged  3. Outpt f/u  4. Family will transport pt home    CM will continue to follow pt until discharged.   Salma

## 2022-03-23 NOTE — PROGRESS NOTES
ORTHOPAEDIC LUMBAR SPINE SURGERY PROGRESS NOTE    NAME:     Halima Carcamo. :       1950   MRN:       [id number]   DATE:      3/23/2022    POD:    2 Days Post-Op  S/P:    Procedure(s):  SPINE INCISION AND DRAINAGE LUMBAR    SUBJECTIVE:    C/O intermittent back pain (R>L) along surgical incision  No leg pain or numbness  Denies nausea/vomiting, headache, chest pain or shortness of breath  Pain controlled    Pre-op wound culture showin [facility]   Culture result:    Abnormal   MODERATE STREPTOCOCCI, BETA HEMOLYTIC GROUP B Penicillin and ampicillin are drugs of choice for treatment of beta-hemolytic streptococcal infections. Susceptibility testing of penicillins and beta-lactams approved by the FDA for treatment of beta-hemolytic streptococcal infections need not be performed routinely, because nonsusceptible isolates are extremely rare. [facility]  [address]   Culture result:   SCANT STAPHYLOCOCCUS SPECIES, [facility]   Culture result:  *STAPH ID AND SENSITIVITY  REQUESTED BY DR Lily Barcenas 3/22 P  ST. [address]               Specimen Collected: 22 18:55 Last Resulted: 22 15:33               OR cultures showing:   LIGHT STREPTOCOCCI, BETA HEMOLYTIC GROUP B         Recent Labs     22  0302   HGB 10.7*   HCT 34.4*      K 4.2      CO2 29   BUN 17   CREA 0.77   *     Patient Vitals for the past 12 hrs:   BP Temp Pulse Resp SpO2   22 0257 127/65 97.9 °F (36.6 °C) 67 17 96 %   22 2158 126/75 98.3 °F (36.8 °C) 70 18 95 %       EXAM:  Dressings clean, dry and intact   Positive strength/ROM bilat lower ext.   Neuro intact to sensation  Calves, soft & nontender  BL LEs NVID  Hemovac drain         PLAN:  Continue prn PO pain medications  Monitor hemovac drain output  PT/OT, OOB w/ assist  Tolerating PO  Appreciate Infectious Disease following for antibiotic mgmt  Lovenox can be restarted tomorrow as d/w Dr. Brock Strickland, [address]  Orthopaedic Surgery  Physician Assistant to Dr. Felicia Power

## 2022-03-23 NOTE — PROGRESS NOTES
Problem: Mobility Impaired (Adult and Pediatric)  Goal: *Acute Goals and Plan of Care (Insert Text)  Description: FUNCTIONAL STATUS PRIOR TO ADMISSION: Patient was independent and active without use of DME.    HOME SUPPORT PRIOR TO ADMISSION: The patient lived alone with sister and friends to provide assistance. Physical Therapy Goals  Initiated 3/22/2022  1. Patient will move from supine to sit and sit to supine  in bed with modified independence within 7 day(s). 2.  Patient will transfer from bed to chair and chair to bed with modified independence using the least restrictive device within 7 day(s). 3.  Patient will perform sit to stand with modified independence within 7 day(s). 4.  Patient will ambulate with modified independence for 250 feet with the least restrictive device within 7 day(s). 5.  Patient will ascend/descend 4 stairs with 1 handrail(s) with minimal assistance/contact guard assist within 7 day(s). Note:   PHYSICAL THERAPY TREATMENT  Patient: Zeferino Curiel (70 y.o. male)  Date: 3/23/2022  Diagnosis: Surgical wound infection [T81.49XA] Postoperative cellulitis of surgical wound  Procedure(s) (LRB):  SPINE INCISION AND DRAINAGE LUMBAR (N/A) 2 Days Post-Op  Precautions: Back  Chart, physical therapy assessment, plan of care and goals were reviewed. ASSESSMENT  Patient continues with skilled PT services. Pt supine to sit with CGA. Pt sit to stand with CGA. Pt ambulated 20ft with RW CGA. Pt left sitting in chair. Pt tolerated treatment fairly well. Pt progressing slowly. Continue goals. PLAN :  Patient continues to benefit from skilled intervention to address the above impairments. Continue treatment per established plan of care. to address goals. Recommendation for discharge: (in order for the patient to meet his/her long term goals)  No skilled physical therapy/ follow up rehabilitation needs identified at this time.     This discharge recommendation:  Has been made in collaboration with the attending provider and/or case management    IF patient discharges home will need the following DME: rolling walker       SUBJECTIVE:       OBJECTIVE DATA SUMMARY:   Critical Behavior:  Neurologic State: Alert  Orientation Level: Oriented X4  Cognition: Follows commands  Safety/Judgement: Awareness of environment  Functional Mobility Training:  Bed Mobility:     Supine to Sit: Contact guard assistance              Transfers:  Sit to Stand: Contact guard assistance  Stand to Sit: Contact guard assistance                             Balance:  Sitting: Intact  Standing: With support  Ambulation/Gait Training:  Distance (ft): 20 Feet (ft)  Assistive Device: Gait belt;Walker, rolling  Ambulation - Level of Assistance: Contact guard assistance        Gait Abnormalities: Decreased step clearance              Speed/Ginny: Pace decreased (<100 feet/min)  Step Length: Right shortened;Left shortened                     Activity Tolerance:   Fair    After treatment patient left in no apparent distress:   Sitting in chair    COMMUNICATION/COLLABORATION:   The patients plan of care was discussed with: Physical therapist.     Jourdan Pandey PTA   Time Calculation: 23 mins

## 2022-03-23 NOTE — PROGRESS NOTES
Problem: Falls - Risk of  Goal: *Absence of Falls  Description: Document Melinda Ground Fall Risk and appropriate interventions in the flowsheet. Outcome: Progressing Towards Goal  Note: Fall Risk Interventions:  Mobility Interventions: Bed/chair exit alarm         Medication Interventions: Bed/chair exit alarm    Elimination Interventions: Call light in reach              Problem: Patient Education: Go to Patient Education Activity  Goal: Patient/Family Education  Outcome: Progressing Towards Goal     Problem: Pressure Injury - Risk of  Goal: *Prevention of pressure injury  Description: Document Cooper Scale and appropriate interventions in the flowsheet.   Outcome: Progressing Towards Goal  Note: Pressure Injury Interventions:  Sensory Interventions: Keep linens dry and wrinkle-free,Float heels    Moisture Interventions: Absorbent underpads    Activity Interventions: Increase time out of bed    Mobility Interventions: Float heels,HOB 30 degrees or less    Nutrition Interventions: Document food/fluid/supplement intake    Friction and Shear Interventions: HOB 30 degrees or less

## 2022-03-23 NOTE — PROGRESS NOTES
Spiritual Care Assessment/Progress Note  [address]      NAME: Amanda Patiño MRN: [id number]  AGE: 80 y.o.  SEX: male  Sikh Affiliation: Caodaism   Language: English     3/23/2022     Total Time (in minutes): 15     Spiritual Assessment begun in [facility]  MED SURG 2 through conversation with:         [x]Patient        [] Family    [] Friend(s)        Reason for Consult: Advance medical directive consult     Spiritual beliefs: (Please include comment if needed)     [x] Identifies with a neris tradition:         [] Supported by a neris community:            [] Claims no spiritual orientation:           [] Seeking spiritual identity:                [] Adheres to an individual form of spirituality:           [] Not able to assess:                           Identified resources for coping:      [x] Prayer                               [] Music                  [] Guided Imagery     [x] Family/friends                 [] Pet visits     [] Devotional reading                         [] Unknown     [] Other:                                              Interventions offered during this visit: (See comments for more details)    Patient Interventions: Advance medical directive consult,Affirmation of emotions/emotional suffering,Catharsis/review of pertinent events in supportive environment,Normalization of emotional/spiritual concerns,Prayer (assurance of)           Plan of Care:     [] Support spiritual and/or cultural needs    [] Support AMD and/or advance care planning process      [] Support grieving process   [] Coordinate Rites and/or Rituals    [] Coordination with community clergy   [] No spiritual needs identified at this time   [] Detailed Plan of Care below (See Comments)  [] Make referral to Music Therapy  [] Make referral to Pet Therapy     [] Make referral to Addiction services  [] Make referral to [facility]  [] Make referral to Spiritual Care Partner  [] No future visits requested [x] Contact Spiritual Care for further referrals     Comments:  Spiritual Consult for Advance Medical Directive (AMD) discussion with . Manoj Nielsen on 7U4 Med Surg unit. Mr Dc Leigh is lying back in bed, nurse in room and he knew reason for Chaplains visit and immediately asked  to come back tomorrow, that he cannot talk today, that he had a rough couple of nights and in discomfort now.  conferred and he thanked . Advised nurse to contact [facility] for any further referrals.     HANG HudsonDiv.  [employer] ([phone])

## 2022-03-23 NOTE — ACP (ADVANCE CARE PLANNING)
Advance Care Planning   Advance Care Planning Inpatient Note  [address] [facility] Department    Today's Date: 3/23/2022  Unit: [facility]  MED SURG 2    Received request from Spiritual Consult. Upon review of chart and communication with care team, patient's decision making abilities are not in question. Patient was/were present in the room during visit. Goals of ACP Conversation:  Facilitate a discussion related to patient's goals of care as they align with the patient's values and beliefs    Health Care Decision Makers:    No healthcare decision makers have been documented. Click here to complete [address] including selection of the Healthcare Decision Maker Relationship (ie \"Primary\")    Summary:  No Decision Maker named by patient at this time    Advance Care Planning Documents (Patient Wishes) on file:  None     Assessment:    Spiritual Consult for Advance Medical Directive (AMD) discussion with Mr. Jas Bonds on 7A3 Med Surg unit. Mr Patrice Wong knew reason for Chaplains visit and asked  to come back tomorrow, that he cannot talk today, that he had a rough couple of nights and in discomfort now.       Interventions:  Deferred conversation as patient not interested in completing an advance directive at this time    Care Preferences Communicated:  No    Outcomes/Plan:  ACP Discussion Completed    Chaplain Nicki on 3/23/2022 at 1:36 PM

## 2022-03-23 NOTE — PROGRESS NOTES
David Nava [facility]  [address] [facility], [address] [place] [address]  [phone]      Medical Progress Note      NAME: Sharla Randolph :  1950  MRM:  [id number]    Date/Time of service: 3/23/2022  2:44 PM       Subjective:     Chief Complaint:  Patient was personally seen and examined by me during this time period. Chart reviewed. C/o some back pain. No fevers, chills       Objective:       Vitals:       Last 24hrs VS reviewed since prior progress note.  Most recent are:    Visit Vitals  /82 (BP 1 Location: Left upper arm)   Pulse (!) 58   Temp 97.9 °F (36.6 °C)   Resp 16   Ht 6' 8\" (2.032 m)   Wt (!) 176.9 kg (390 lb)   SpO2 97%   BMI 42.84 kg/m²     SpO2 Readings from Last 6 Encounters:   22 97%   22 95%   22 96%   22 96%   22 96%   21 97%    O2 Flow Rate (L/min): 2 l/min       Intake/Output Summary (Last 24 hours) at 3/23/2022 1444  Last data filed at 3/23/2022 1342  Gross per 24 hour   Intake 200 ml   Output 2330 ml   Net -2130 ml        Exam:     Physical Exam:    Gen:  Well-developed, well-nourished, morbidly obese, in no acute distress  HEENT:  Pink conjunctivae, PERRL, hearing intact to voice, moist mucous membranes  Neck:  Supple, without masses, thyroid non-tender  Resp:  No accessory muscle use, clear breath sounds without wheezes rales or rhonchi  Card:  No murmurs, normal S1, S2 without thrills, bruits or peripheral edema  Abd:  Soft, non-tender, non-distended, normoactive bowel sounds are present  Musc:  No cyanosis or clubbing  Skin:  Has drain to surgical wound   Neuro:  Cranial nerves 3-12 are grossly intact, follows commands appropriately  Psych:  Good insight, oriented to person, place and time, alert    Medications Reviewed: (see below)    Lab Data Reviewed: (see below)    ______________________________________________________________________    Medications:     Current Facility-Administered Medications   Medication Dose Route Frequency    famotidine (PEPCID) tablet 20 mg  20 mg Oral BID    alum-mag hydroxide-simeth (MYLANTA) oral suspension 30 mL  30 mL Oral QID PRN    Vancomycin Level - please draw on 03/23 @ 1800. Thanks!    Other ONCE    sodium chloride (NS) flush 5-40 mL  5-40 mL IntraVENous Q8H    sodium chloride (NS) flush 5-40 mL  5-40 mL IntraVENous PRN    naloxone (NARCAN) injection 0.4 mg  0.4 mg IntraVENous PRN    senna-docusate (PERICOLACE) 8.6-50 mg per tablet 1 Tablet  1 Tablet Oral BID    polyethylene glycol (MIRALAX) packet 17 g  17 g Oral DAILY    [START ON 3/24/2022] bisacodyL (DULCOLAX) suppository 10 mg  10 mg Rectal DAILY PRN    diphenhydrAMINE (BENADRYL) injection 25 mg  25 mg IntraVENous Q6H PRN    oxyCODONE IR (ROXICODONE) tablet 5 mg  5 mg Oral Q3H PRN    oxyCODONE IR (ROXICODONE) tablet 10 mg  10 mg Oral Q3H PRN    vancomycin (VANCOCIN) 1750 mg in  ml infusion  1,750 mg IntraVENous Q12H    zolpidem (AMBIEN) tablet 5 mg  5 mg Oral QHS PRN    simethicone (MYLICON) tablet 80 mg  80 mg Oral QID PRN    cyclobenzaprine (FLEXERIL) tablet 10 mg  10 mg Oral TID PRN    folic acid (FOLVITE) tablet 1 mg  1 mg Oral DAILY    tamsulosin (FLOMAX) capsule 0.4 mg  0.4 mg Oral DAILY    sodium chloride (NS) flush 5-40 mL  5-40 mL IntraVENous Q8H    sodium chloride (NS) flush 5-40 mL  5-40 mL IntraVENous PRN    acetaminophen (TYLENOL) tablet 650 mg  650 mg Oral Q6H PRN    Or    acetaminophen (TYLENOL) suppository 650 mg  650 mg Rectal Q6H PRN    ondansetron (ZOFRAN ODT) tablet 4 mg  4 mg Oral Q8H PRN    Or    ondansetron (ZOFRAN) injection 4 mg  4 mg IntraVENous Q6H PRN    [Held by provider] enoxaparin (LOVENOX) injection 60 mg  60 mg SubCUTAneous Q12H    piperacillin-tazobactam (ZOSYN) 3.375 g in 0.9% sodium chloride (MBP/ADV) 100 mL MBP  3.375 g IntraVENous Q8H    melatonin tablet 3 mg  3 mg Oral QHS PRN          Lab Review:     Recent Labs     03/23/22  0302 03/22/22  0145   WBC 8.3 7.2 HGB 10.7* 11.2*   HCT 34.4* 36.0*    271     Recent Labs     03/23/22  0302 03/22/22  0145    134*   K 4.2 4.8    103   CO2 29 26   * 151*   BUN 17 14   CREA 0.77 0.76   CA 8.5 8.5   MG 2.2  --    PHOS 2.2*  --      No results found for: GLUCPOC       Assessment / Plan:     Principal Problem:    79 yo hx of RA, ankylosing spondylitis, lumbar stenosis s/p recent L2-L5 lamicectomy, presented w/ wound dehiscence, post-op wound infection, s/p I&D 03/21. Medicine is following as a consultant    1) Postoperative cellulitis of surgical wound/dehiscence of wound: immunocompromised on MTX and Remicade. S/p I&D by Ortho on 03/21. Wound Cx growing coag neg staph and Group B strep. Cont IV Vanc/Zosyn.   Patient will need 6 weeks of IV abx per ID    2) RA/ankylosing spondylitis: holding home MTX    3) Morbid obesity: recommended diet, wt loss, exercise    4) Hypophos: replete IV    Total time spent with patient: 35 min **I personally saw and examined the patient during this time period**                 Care Plan discussed with: Patient, nursing     Discussed:  Care Plan    Prophylaxis:  Lovenox (on hold by Ortho), SCDs    Disposition:  per primary team           ___________________________________________________    Attending Physician: Jennifer Stevens MD

## 2022-03-24 PROBLEM — T81.49XA POSTOPERATIVE CELLULITIS OF SURGICAL WOUND: Status: ACTIVE | Noted: 2022-03-20

## 2022-03-24 PROBLEM — T81.49XA SURGICAL WOUND INFECTION: Status: ACTIVE | Noted: 2022-03-20

## 2022-03-24 LAB
BACTERIA SPEC CULT: ABNORMAL
ERYTHROCYTE [DISTWIDTH] IN BLOOD BY AUTOMATED COUNT: 14.2 % (ref 11.5–14.5)
GRAM STN SPEC: ABNORMAL
GRAM STN SPEC: ABNORMAL
HCT VFR BLD AUTO: 32.2 % (ref 36.6–50.3)
HGB BLD-MCNC: 10.2 G/DL (ref 12.1–17)
MCH RBC QN AUTO: 25.8 PG (ref 26–34)
MCHC RBC AUTO-ENTMCNC: 31.7 G/DL (ref 30–36.5)
MCV RBC AUTO: 81.5 FL (ref 80–99)
NRBC # BLD: 0 K/UL (ref 0–0.01)
NRBC BLD-RTO: 0 PER 100 WBC
PLATELET # BLD AUTO: 336 K/UL (ref 150–400)
PMV BLD AUTO: 8.9 FL (ref 8.9–12.9)
RBC # BLD AUTO: 3.95 M/UL (ref 4.1–5.7)
SERVICE CMNT-IMP: ABNORMAL
WBC # BLD AUTO: 7.2 K/UL (ref 4.1–11.1)

## 2022-03-24 PROCEDURE — 97116 GAIT TRAINING THERAPY: CPT

## 2022-03-24 PROCEDURE — 97530 THERAPEUTIC ACTIVITIES: CPT

## 2022-03-24 PROCEDURE — 65270000029 HC RM PRIVATE

## 2022-03-24 PROCEDURE — 74011000250 HC RX REV CODE- 250: Performed by: STUDENT IN AN ORGANIZED HEALTH CARE EDUCATION/TRAINING PROGRAM

## 2022-03-24 PROCEDURE — 94761 N-INVAS EAR/PLS OXIMETRY MLT: CPT

## 2022-03-24 PROCEDURE — 74011250636 HC RX REV CODE- 250/636: Performed by: STUDENT IN AN ORGANIZED HEALTH CARE EDUCATION/TRAINING PROGRAM

## 2022-03-24 PROCEDURE — 74011250637 HC RX REV CODE- 250/637: Performed by: INTERNAL MEDICINE

## 2022-03-24 PROCEDURE — 74011000258 HC RX REV CODE- 258: Performed by: HOSPITALIST

## 2022-03-24 PROCEDURE — 74011250636 HC RX REV CODE- 250/636: Performed by: INTERNAL MEDICINE

## 2022-03-24 PROCEDURE — 74011000250 HC RX REV CODE- 250: Performed by: HOSPITALIST

## 2022-03-24 PROCEDURE — 36415 COLL VENOUS BLD VENIPUNCTURE: CPT

## 2022-03-24 PROCEDURE — 99233 SBSQ HOSP IP/OBS HIGH 50: CPT | Performed by: STUDENT IN AN ORGANIZED HEALTH CARE EDUCATION/TRAINING PROGRAM

## 2022-03-24 PROCEDURE — 74011250637 HC RX REV CODE- 250/637: Performed by: PHYSICIAN ASSISTANT

## 2022-03-24 PROCEDURE — 85027 COMPLETE CBC AUTOMATED: CPT

## 2022-03-24 PROCEDURE — 74011250637 HC RX REV CODE- 250/637: Performed by: HOSPITALIST

## 2022-03-24 PROCEDURE — 74011000250 HC RX REV CODE- 250: Performed by: PHYSICIAN ASSISTANT

## 2022-03-24 PROCEDURE — 74011250636 HC RX REV CODE- 250/636: Performed by: HOSPITALIST

## 2022-03-24 PROCEDURE — 74011250637 HC RX REV CODE- 250/637: Performed by: NURSE PRACTITIONER

## 2022-03-24 RX ADMIN — Medication 10 ML: at 22:26

## 2022-03-24 RX ADMIN — DOCUSATE SODIUM 50 MG AND SENNOSIDES 8.6 MG 1 TABLET: 8.6; 5 TABLET, FILM COATED ORAL at 18:33

## 2022-03-24 RX ADMIN — CYCLOBENZAPRINE 10 MG: 10 TABLET, FILM COATED ORAL at 15:52

## 2022-03-24 RX ADMIN — Medication 3 MG: at 22:26

## 2022-03-24 RX ADMIN — OXYCODONE 5 MG: 5 TABLET ORAL at 10:04

## 2022-03-24 RX ADMIN — OXYCODONE HYDROCHLORIDE 10 MG: 5 TABLET ORAL at 22:25

## 2022-03-24 RX ADMIN — POLYETHYLENE GLYCOL 3350 17 G: 17 POWDER, FOR SOLUTION ORAL at 10:04

## 2022-03-24 RX ADMIN — SODIUM CHLORIDE 2 G: 9 INJECTION INTRAMUSCULAR; INTRAVENOUS; SUBCUTANEOUS at 15:43

## 2022-03-24 RX ADMIN — FAMOTIDINE 20 MG: 20 TABLET, FILM COATED ORAL at 10:04

## 2022-03-24 RX ADMIN — OXYCODONE 5 MG: 5 TABLET ORAL at 15:52

## 2022-03-24 RX ADMIN — ENOXAPARIN SODIUM 60 MG: 100 INJECTION SUBCUTANEOUS at 22:26

## 2022-03-24 RX ADMIN — VANCOMYCIN HYDROCHLORIDE 1750 MG: 10 INJECTION, POWDER, LYOPHILIZED, FOR SOLUTION INTRAVENOUS at 11:34

## 2022-03-24 RX ADMIN — DOCUSATE SODIUM 50 MG AND SENNOSIDES 8.6 MG 1 TABLET: 8.6; 5 TABLET, FILM COATED ORAL at 10:04

## 2022-03-24 RX ADMIN — FAMOTIDINE 20 MG: 20 TABLET, FILM COATED ORAL at 18:33

## 2022-03-24 RX ADMIN — OXYCODONE HYDROCHLORIDE 10 MG: 5 TABLET ORAL at 04:29

## 2022-03-24 RX ADMIN — CYCLOBENZAPRINE 10 MG: 10 TABLET, FILM COATED ORAL at 22:25

## 2022-03-24 RX ADMIN — VANCOMYCIN HYDROCHLORIDE 1750 MG: 10 INJECTION, POWDER, LYOPHILIZED, FOR SOLUTION INTRAVENOUS at 00:03

## 2022-03-24 RX ADMIN — FOLIC ACID 1 MG: 1 TABLET ORAL at 10:04

## 2022-03-24 RX ADMIN — Medication 10 ML: at 22:00

## 2022-03-24 RX ADMIN — CYCLOBENZAPRINE 10 MG: 10 TABLET, FILM COATED ORAL at 04:29

## 2022-03-24 RX ADMIN — PIPERACILLIN AND TAZOBACTAM 3.38 G: 3; .375 INJECTION, POWDER, LYOPHILIZED, FOR SOLUTION INTRAVENOUS at 05:59

## 2022-03-24 RX ADMIN — ENOXAPARIN SODIUM 60 MG: 100 INJECTION SUBCUTANEOUS at 10:03

## 2022-03-24 RX ADMIN — TAMSULOSIN HYDROCHLORIDE 0.4 MG: 0.4 CAPSULE ORAL at 10:04

## 2022-03-24 NOTE — PROGRESS NOTES
[facility] Vancomycin Pharmacy Consult 03/23/22  Vancomycin random level= 17.4 mcg/ml. It predicts an   This is within goal    Plan:   Will continue current regimen    Thank you  Jeff Scott, PharmD  [phone]

## 2022-03-24 NOTE — PROGRESS NOTES
Infectious Disease Progress Note         Interval:  NAEO    Subjective:   Feels OK, no acute concerns            Objective:    Vitals:   Reviewed in chart. Physical Exam:  ? GEN: NAD  ? HEENT: Normocephalic, atraumatic, PERRL, no scleral icterus  ? CV: S1, S2 heard regularly, no murmurs, thrills or rubs heard   ? Lungs: Clear to auscultation bilaterally  ? Abdomen: soft, non distended, non tender,  ? Genitourinary:  no ramesh  ? Extremities: no edema  ? Neuro: Alert, oriented to time, place and situation, moves all extremities to commands, verbal   ? Skin: no rash  ? Psych: good affect, good eye contact, non tearful   ? Lines: Peripheral IV lines          Labs:  Recent Results (from the past 24 hour(s))   VANCOMYCIN, RANDOM    Collection Time: 03/23/22  6:50 PM   Result Value Ref Range    Vancomycin, random 17.4 UG/ML   CBC W/O DIFF    Collection Time: 03/24/22 12:22 AM   Result Value Ref Range    WBC 7.2 4.1 - 11.1 K/uL    RBC 3.95 (L) 4.10 - 5.70 M/uL    HGB 10.2 (L) 12.1 - 17.0 g/dL    HCT 32.2 (L) 36.6 - 50.3 %    MCV 81.5 80.0 - 99.0 FL    MCH 25.8 (L) 26.0 - 34.0 PG    MCHC 31.7 30.0 - 36.5 g/dL    RDW 14.2 11.5 - 14.5 %    PLATELET 064 821 - 104 K/uL    MPV 8.9 8.9 - 12.9 FL    NRBC 0.0 0  WBC    ABSOLUTE NRBC 0.00 0.00 - 0.01 K/uL             Assessment:  80y.o. year old moderately immunocompromised male with history of ankylosing spondylitis (was on Remicade until first week Feb 2022), reported psoriatic arthritis, L2, L3, L4, and L5 laminectomy, partial facetectomy, and foraminotomy on 2/15/22. Patient denies being on steroids recently.     Admitted for surgical site infection. ower aspect of the surgical incision was seen to have dehisced with some clear drainage. Specimens were sent from this drainage for cultures are growing beta-hemolytic group B streptococci and coagulase-negative staph species. Blood cultures from 3/20/2022 are negative so far.   CT lumbar spine with contrast showed subcutaneous fluid overlying the lumbar decompression levels with a few gas bubbles tracking down to the epidural space.     On 3/21/2022, patient underwent incision and drainage of the lumbar wound. Operative report was reviewed in detail.       Recommendations:  - Abx switched to ceftriaxone 2gm daily. Patient reports a remote rxn to cefixie about 30 yrs ago (not able to move for a few days? ?). D/w pharmacy - tolerated graded cefazolin challenge some months back.   - BCx 3/20 negative  - Non-operative cultures growing MSSE and beta-hemolytic group B streptococci. MSSE likely contaminant in this case. - Operative cultures growing beta-hemolytic group B streptococci only. - Plan for 6 weeks of IV abx.   - OK to place PICC  - Hemovac mgt per Orthopedics. Final abx orders by 3/25/22. Patient can follow up with Dr. Annie Garibay as outpatient in 2-3 weeks. We will follow. Dr. Jil Holter will cover 3/25/22 to 3/27/22. If patient is still inpatient then, Tonya Pelletier will resume care from 3/28/22. Thank you for the opportunity to participate in the care of this patient. Please contact with questions or concerns.       Jose Antonio Guidry MD  Infectious Diseases

## 2022-03-24 NOTE — PROGRESS NOTES
3/24/2022   CARE MANAGEMENT NOTE:  CM reviewed EMR. Pt was admitted with post surgical wound/cellulitis/partial wound dehiscience. Reportedly, pt resides alone. Sister Barry Robledo (Z [phone]) is a primary family contact.     RUR 10%     Transition Plan of Care:  1. Ortho, ID following for medical management  2. [facility] (PT, OT) has been arranged  3. Outpt f/u  4. Family will transport pt home     CM will continue to follow pt until discharged.   Salma

## 2022-03-24 NOTE — PROGRESS NOTES
Problem: Mobility Impaired (Adult and Pediatric)  Goal: *Acute Goals and Plan of Care (Insert Text)  Description: FUNCTIONAL STATUS PRIOR TO ADMISSION: Patient was independent and active without use of DME.    HOME SUPPORT PRIOR TO ADMISSION: The patient lived alone with sister and friends to provide assistance. Physical Therapy Goals  Initiated 3/22/2022  1. Patient will move from supine to sit and sit to supine  in bed with modified independence within 7 day(s). 2.  Patient will transfer from bed to chair and chair to bed with modified independence using the least restrictive device within 7 day(s). 3.  Patient will perform sit to stand with modified independence within 7 day(s). 4.  Patient will ambulate with modified independence for 250 feet with the least restrictive device within 7 day(s). 5.  Patient will ascend/descend 4 stairs with 1 handrail(s) with minimal assistance/contact guard assist within 7 day(s). Outcome: Progressing Towards Goal  Note:   PHYSICAL THERAPY TREATMENT  Patient: Patricio Busby (70 y.o. male)  Date: 3/24/2022  Diagnosis: Surgical wound infection [T81.49XA] Postoperative cellulitis of surgical wound  Procedure(s) (LRB):  SPINE INCISION AND DRAINAGE LUMBAR (N/A) 3 Days Post-Op  Precautions: Back  Chart, physical therapy assessment, plan of care and goals were reviewed. ASSESSMENT  Patient continues with skilled PT services and progressing towards goals. Pt expressing frustration with hospitalization, eager for OOB. SBA/CGA with increased time for bed mobility and gait training. Back brace not present. Denies LE weakness or pain and denies SOB. Pt remained in chair at end of session. Current Level of Function Impacting Discharge (mobility/balance): CGA    Other factors to consider for discharge:          PLAN :  Patient continues to benefit from skilled intervention to address the above impairments. Continue treatment per established plan of care.   to address goals. Recommendation for discharge: (in order for the patient to meet his/her long term goals)  Physical therapy at least 2 days/week in the home     This discharge recommendation:  Has been made in collaboration with the attending provider and/or case management    IF patient discharges home will need the following DME: patient owns DME required for discharge       SUBJECTIVE:   Patient stated Kyle Relic just want some answers.     OBJECTIVE DATA SUMMARY:   Critical Behavior:  Neurologic State: Alert  Orientation Level: Appropriate for age  Cognition: Appropriate decision making,Appropriate for age attention/concentration  Safety/Judgement: Awareness of environment  Functional Mobility Training:  Bed Mobility:     Supine to Sit: Additional time;Stand-by assistance     Scooting: Stand-by assistance        Transfers:  Sit to Stand: Contact guard assistance;Stand-by assistance  Stand to Sit: Stand-by assistance                             Balance:  Sitting: Intact  Standing: With support; Intact  Ambulation/Gait Training:  Distance (ft): 60 Feet (ft)  Assistive Device: Walker, rolling;Gait belt  Ambulation - Level of Assistance: Contact guard assistance        Gait Abnormalities: Decreased step clearance              Speed/Ginny: Pace decreased (<100 feet/min)                       Stairs: Therapeutic Exercises:     Pain Ratin/10 LBP    Activity Tolerance:   Good    After treatment patient left in no apparent distress:   Sitting in chair, Call bell within reach, and Bed / chair alarm activated    COMMUNICATION/COLLABORATION:   The patients plan of care was discussed with: Registered nurseCyndy Patel   Time Calculation: 25 mins

## 2022-03-24 NOTE — PROGRESS NOTES
ORTHOPAEDIC LUMBAR FUSION PROGRESS NOTE    NAME:     Elda Sharp. :       1950   MRN:       [id number]   DATE:      3/24/2022    POD:    3 Days Post-Op  S/P:    Procedure(s):  SPINE INCISION AND DRAINAGE LUMBAR    SUBJECTIVE:    C/O back pain along surgical incision  No leg pain or numbness  Denies nausea/vomiting, headache, chest pain or shortness of breath  Pain controlled    Recent Labs     22  0022 22  0302 22  0302   HGB 10.2*   < > 10.7*   HCT 32.2*   < > 34.4*   NA  --   --  138   K  --   --  4.2   CL  --   --  105   CO2  --   --  29   BUN  --   --  17   CREA  --   --  0.77   GLU  --   --  118*    < > = values in this interval not displayed. Patient Vitals for the past 12 hrs:   BP Temp Pulse Resp SpO2   22 0758 (!) 146/73 99 °F (37.2 °C) 62 18 96 %   22 0438 119/73 98.2 °F (36.8 °C) 60 18 96 %   22 0030 123/70 98.2 °F (36.8 °C) 61 18 96 %   22 2126 129/75 97.4 °F (36.3 °C) 64 18 97 %       EXAM:  Dressings clean, dry and intact   Positive strength/ROM bilat lower ext.   Neuro intact to sensation  Calves, soft & nontender  BL LEs NVID      PLAN:  Continue prn PO pain medications  Monitor hemovac drain output  PT/OT, OOB w/ assist  Tolerating PO  Appreciate Hospitalist team  Appreciate Infectious Disease following for antibiotic mgmt      Anca Vernon, [id number]  Orthopaedic Surgery  Physician Assistant to Dr. Adama Nagy

## 2022-03-24 NOTE — PROGRESS NOTES
David Nava [facility] 80  [address]  [phone]      Medical Progress Note      NAME: Sonny Estes. :  1950  MRM:  [id number]    Date/Time of service: 3/24/2022  11:38 AM       Subjective:     Chief Complaint:  Patient was personally seen and examined by me during this time period. Chart reviewed. Doing well. No chest pain, SOB       Objective:       Vitals:       Last 24hrs VS reviewed since prior progress note.  Most recent are:    Visit Vitals  /67   Pulse 61   Temp 98.1 °F (36.7 °C)   Resp 21   Ht 6' 8\" (2.032 m)   Wt (!) 176.9 kg (390 lb)   SpO2 95%   BMI 42.84 kg/m²     SpO2 Readings from Last 6 Encounters:   22 95%   22 95%   22 96%   22 96%   22 96%   21 97%    O2 Flow Rate (L/min): 2 l/min       Intake/Output Summary (Last 24 hours) at 3/24/2022 1138  Last data filed at 3/24/2022 9341  Gross per 24 hour   Intake 2260 ml   Output 3590 ml   Net -1330 ml        Exam:     Physical Exam:    Gen:  Well-developed, well-nourished, morbidly obese, in no acute distress  HEENT:  Pink conjunctivae, PERRL, hearing intact to voice, moist mucous membranes  Neck:  Supple, without masses, thyroid non-tender  Resp:  No accessory muscle use, clear breath sounds without wheezes rales or rhonchi  Card:  No murmurs, normal S1, S2 without thrills, bruits or peripheral edema  Abd:  Soft, non-tender, non-distended, normoactive bowel sounds are present  Musc:  No cyanosis or clubbing  Skin:  Has drain to surgical wound   Neuro:  Cranial nerves 3-12 are grossly intact, follows commands appropriately  Psych:  Good insight, oriented to person, place and time, alert    Medications Reviewed: (see below)    Lab Data Reviewed: (see below)    ______________________________________________________________________    Medications:     Current Facility-Administered Medications   Medication Dose Route Frequency    famotidine (PEPCID) tablet 20 mg  20 mg Oral BID    alum-mag hydroxide-simeth (MYLANTA) oral suspension 30 mL  30 mL Oral QID PRN    sodium chloride (NS) flush 5-40 mL  5-40 mL IntraVENous Q8H    sodium chloride (NS) flush 5-40 mL  5-40 mL IntraVENous PRN    naloxone (NARCAN) injection 0.4 mg  0.4 mg IntraVENous PRN    senna-docusate (PERICOLACE) 8.6-50 mg per tablet 1 Tablet  1 Tablet Oral BID    polyethylene glycol (MIRALAX) packet 17 g  17 g Oral DAILY    bisacodyL (DULCOLAX) suppository 10 mg  10 mg Rectal DAILY PRN    diphenhydrAMINE (BENADRYL) injection 25 mg  25 mg IntraVENous Q6H PRN    oxyCODONE IR (ROXICODONE) tablet 5 mg  5 mg Oral Q3H PRN    oxyCODONE IR (ROXICODONE) tablet 10 mg  10 mg Oral Q3H PRN    vancomycin (VANCOCIN) 1750 mg in  ml infusion  1,750 mg IntraVENous Q12H    zolpidem (AMBIEN) tablet 5 mg  5 mg Oral QHS PRN    simethicone (MYLICON) tablet 80 mg  80 mg Oral QID PRN    cyclobenzaprine (FLEXERIL) tablet 10 mg  10 mg Oral TID PRN    folic acid (FOLVITE) tablet 1 mg  1 mg Oral DAILY    tamsulosin (FLOMAX) capsule 0.4 mg  0.4 mg Oral DAILY    sodium chloride (NS) flush 5-40 mL  5-40 mL IntraVENous Q8H    sodium chloride (NS) flush 5-40 mL  5-40 mL IntraVENous PRN    acetaminophen (TYLENOL) tablet 650 mg  650 mg Oral Q6H PRN    Or    acetaminophen (TYLENOL) suppository 650 mg  650 mg Rectal Q6H PRN    ondansetron (ZOFRAN ODT) tablet 4 mg  4 mg Oral Q8H PRN    Or    ondansetron (ZOFRAN) injection 4 mg  4 mg IntraVENous Q6H PRN    enoxaparin (LOVENOX) injection 60 mg  60 mg SubCUTAneous Q12H    melatonin tablet 3 mg  3 mg Oral QHS PRN          Lab Review:     Recent Labs     03/24/22  0022 03/23/22  0302 03/22/22  0145   WBC 7.2 8.3 7.2   HGB 10.2* 10.7* 11.2*   HCT 32.2* 34.4* 36.0*    351 271     Recent Labs     03/23/22  0302 03/22/22  0145    134*   K 4.2 4.8    103   CO2 29 26   * 151*   BUN 17 14   CREA 0.77 0.76   CA 8.5 8.5   MG 2.2  --    PHOS 2.2* --      No results found for: GLUCPOC       Assessment / Plan:     Principal Problem:    81 yo hx of RA, ankylosing spondylitis, lumbar stenosis s/p recent L2-L5 lamicectomy, presented w/ wound dehiscence, post-op wound infection, s/p I&D 03/21. Medicine is following as a consultant    1) Postoperative cellulitis of surgical wound/abscess/dehiscence of wound: immunocompromised on MTX and Remicade. S/p I&D by Ortho on 03/21. Wound Cx growing staph Epi and Group B strep. Cont IV Vanc. Patient will need 6 weeks of IV abx per ID.   Will defer to Ortho and ID    2) RA/ankylosing spondylitis: holding home MTX    3) Morbid obesity: recommended diet, wt loss, exercise    4) Hypophos: repleted IV, monitor prn     Total time spent with patient: 20 min **I personally saw and examined the patient during this time period**                 Care Plan discussed with: Patient, nursing     Discussed:  Care Plan    Prophylaxis:  Lovenox , SCDs    Disposition:  per primary team           ___________________________________________________    Attending Physician: Jackelyn Milian MD

## 2022-03-24 NOTE — PROGRESS NOTES
Problem: Mobility Impaired (Adult and Pediatric)  Goal: *Acute Goals and Plan of Care (Insert Text)  Description: FUNCTIONAL STATUS PRIOR TO ADMISSION: Patient was independent and active without use of DME.    HOME SUPPORT PRIOR TO ADMISSION: The patient lived alone with sister and friends to provide assistance. Physical Therapy Goals  Initiated 3/22/2022  1. Patient will move from supine to sit and sit to supine  in bed with modified independence within 7 day(s). 2.  Patient will transfer from bed to chair and chair to bed with modified independence using the least restrictive device within 7 day(s). 3.  Patient will perform sit to stand with modified independence within 7 day(s). 4.  Patient will ambulate with modified independence for 250 feet with the least restrictive device within 7 day(s). 5.  Patient will ascend/descend 4 stairs with 1 handrail(s) with minimal assistance/contact guard assist within 7 day(s).   3/24/2022 1532 by Shant Kline  Outcome: Progressing Towards Goal  Note:   PHYSICAL THERAPY TREATMENT  Patient: Zachary Joyner (70 y.o. male)  Date: 3/24/2022  Diagnosis: Surgical wound infection [T81.49XA] Postoperative cellulitis of surgical wound  Procedure(s) (LRB):  SPINE INCISION AND DRAINAGE LUMBAR (N/A) 3 Days Post-Op  Precautions: Back  Chart, physical therapy assessment, plan of care and goals were reviewed. ASSESSMENT  Patient continues with skilled PT services and progressing towards goals. Pt tolerated sitting in chair 50 min. Pt report \"stiffness\" in LB with sitting with increased time for sit>stand. Steady using RW. ModA x 1 for sit>supine. Current Level of Function Impacting Discharge (mobility/balance): Other factors to consider for discharge:          PLAN :  Patient continues to benefit from skilled intervention to address the above impairments. Continue treatment per established plan of care. to address goals.     Recommendation for discharge: (in order for the patient to meet his/her long term goals)  Physical therapy at least 2 days/week in the home     This discharge recommendation:  Has been made in collaboration with the attending provider and/or case management    IF patient discharges home will need the following DME: patient owns DME required for discharge       SUBJECTIVE:   Patient stated Chen Schneider just get so stiff in my back.     OBJECTIVE DATA SUMMARY:   Critical Behavior:  Neurologic State: Alert  Orientation Level: Appropriate for age  Cognition: Appropriate decision making,Appropriate for age attention/concentration  Safety/Judgement: Awareness of environment  Functional Mobility Training:  Bed Mobility:     Supine to Sit: Additional time;Stand-by assistance  Sit to Supine: Moderate assistance;Assist x1;Additional time  Scooting: Stand-by assistance        Transfers:  Sit to Stand: Contact guard assistance;Stand-by assistance  Stand to Sit: Minimum assistance;Assist x1;Additional time                             Balance:  Sitting: Intact  Standing: With support; Intact  Ambulation/Gait Training:  Distance (ft): 10 Feet (ft)  Assistive Device: Walker, rolling;Gait belt  Ambulation - Level of Assistance: Contact guard assistance        Gait Abnormalities: Decreased step clearance              Speed/Ginny: Pace decreased (<100 feet/min)                       Stairs: Therapeutic Exercises:     Pain Rating:  \"stiffness\" in LB with attempt to stand    Activity Tolerance:   Good    After treatment patient left in no apparent distress:   Supine in bed and Call bell within reach    COMMUNICATION/COLLABORATION:   The patients plan of care was discussed with: Registered nurseCyndy Bourgeois   Time Calculation: 23 mins          3/24/2022 1431 by Gregg Bauer  Outcome: Progressing Towards Goal  Note:   PHYSICAL THERAPY TREATMENT  Patient: Cydney Horne  (70 y.o. male)  Date: 3/24/2022  Diagnosis: Surgical wound infection [T81.49XA] Postoperative cellulitis of surgical wound  Procedure(s) (LRB):  SPINE INCISION AND DRAINAGE LUMBAR (N/A) 3 Days Post-Op  Precautions: Back  Chart, physical therapy assessment, plan of care and goals were reviewed. ASSESSMENT  Patient continues with skilled PT services and progressing towards goals. Pt expressing frustration with hospitalization, eager for OOB. SBA/CGA with increased time for bed mobility and gait training. Back brace not present. Denies LE weakness or pain and denies SOB. Pt remained in chair at end of session. Current Level of Function Impacting Discharge (mobility/balance): CGA    Other factors to consider for discharge:          PLAN :  Patient continues to benefit from skilled intervention to address the above impairments. Continue treatment per established plan of care. to address goals. Recommendation for discharge: (in order for the patient to meet his/her long term goals)  Physical therapy at least 2 days/week in the home     This discharge recommendation:  Has been made in collaboration with the attending provider and/or case management    IF patient discharges home will need the following DME: patient owns DME required for discharge       SUBJECTIVE:   Patient stated Torrey Beal just want some answers.     OBJECTIVE DATA SUMMARY:   Critical Behavior:  Neurologic State: Alert  Orientation Level: Appropriate for age  Cognition: Appropriate decision making,Appropriate for age attention/concentration  Safety/Judgement: Awareness of environment  Functional Mobility Training:  Bed Mobility:     Supine to Sit: Additional time;Stand-by assistance     Scooting: Stand-by assistance        Transfers:  Sit to Stand: Contact guard assistance;Stand-by assistance  Stand to Sit: Stand-by assistance                             Balance:  Sitting: Intact  Standing: With support; Intact  Ambulation/Gait Training:  Distance (ft): 60 Feet (ft)  Assistive Device: Walker, rolling;Gait belt  Ambulation - Level of Assistance: Contact guard assistance        Gait Abnormalities: Decreased step clearance              Speed/Ginny: Pace decreased (<100 feet/min)                       Stairs: Therapeutic Exercises:     Pain Ratin/10 LBP    Activity Tolerance:   Good    After treatment patient left in no apparent distress:   Sitting in chair, Call bell within reach, and Bed / chair alarm activated    COMMUNICATION/COLLABORATION:   The patients plan of care was discussed with: Registered nurse.      Gustabo Wright   Time Calculation: 25 mins

## 2022-03-25 ENCOUNTER — APPOINTMENT (OUTPATIENT)
Dept: GENERAL RADIOLOGY | Age: 72
DRG: 863 | End: 2022-03-25
Attending: INTERNAL MEDICINE
Payer: MEDICARE

## 2022-03-25 VITALS
WEIGHT: 315 LBS | DIASTOLIC BLOOD PRESSURE: 77 MMHG | OXYGEN SATURATION: 96 % | BODY MASS INDEX: 36.45 KG/M2 | SYSTOLIC BLOOD PRESSURE: 148 MMHG | HEIGHT: 78 IN | TEMPERATURE: 98.4 F | HEART RATE: 68 BPM | RESPIRATION RATE: 18 BRPM

## 2022-03-25 LAB
ANION GAP SERPL CALC-SCNC: 6 MMOL/L (ref 5–15)
BUN SERPL-MCNC: 10 MG/DL (ref 6–20)
BUN/CREAT SERPL: 12 (ref 12–20)
CALCIUM SERPL-MCNC: 8.5 MG/DL (ref 8.5–10.1)
CHLORIDE SERPL-SCNC: 105 MMOL/L (ref 97–108)
CO2 SERPL-SCNC: 25 MMOL/L (ref 21–32)
CREAT SERPL-MCNC: 0.81 MG/DL (ref 0.7–1.3)
ERYTHROCYTE [DISTWIDTH] IN BLOOD BY AUTOMATED COUNT: 14.1 % (ref 11.5–14.5)
GLUCOSE SERPL-MCNC: 143 MG/DL (ref 65–100)
HCT VFR BLD AUTO: 34 % (ref 36.6–50.3)
HGB BLD-MCNC: 10.7 G/DL (ref 12.1–17)
MAGNESIUM SERPL-MCNC: 2 MG/DL (ref 1.6–2.4)
MCH RBC QN AUTO: 25.5 PG (ref 26–34)
MCHC RBC AUTO-ENTMCNC: 31.5 G/DL (ref 30–36.5)
MCV RBC AUTO: 81 FL (ref 80–99)
NRBC # BLD: 0 K/UL (ref 0–0.01)
NRBC BLD-RTO: 0 PER 100 WBC
PHOSPHATE SERPL-MCNC: 3.3 MG/DL (ref 2.6–4.7)
PLATELET # BLD AUTO: 399 K/UL (ref 150–400)
PMV BLD AUTO: 9.4 FL (ref 8.9–12.9)
POTASSIUM SERPL-SCNC: 4 MMOL/L (ref 3.5–5.1)
RBC # BLD AUTO: 4.2 M/UL (ref 4.1–5.7)
SODIUM SERPL-SCNC: 136 MMOL/L (ref 136–145)
WBC # BLD AUTO: 8.1 K/UL (ref 4.1–11.1)

## 2022-03-25 PROCEDURE — 76937 US GUIDE VASCULAR ACCESS: CPT

## 2022-03-25 PROCEDURE — 74011000250 HC RX REV CODE- 250: Performed by: HOSPITALIST

## 2022-03-25 PROCEDURE — 97530 THERAPEUTIC ACTIVITIES: CPT

## 2022-03-25 PROCEDURE — 36415 COLL VENOUS BLD VENIPUNCTURE: CPT

## 2022-03-25 PROCEDURE — 74011250637 HC RX REV CODE- 250/637: Performed by: PHYSICIAN ASSISTANT

## 2022-03-25 PROCEDURE — 74011250636 HC RX REV CODE- 250/636: Performed by: STUDENT IN AN ORGANIZED HEALTH CARE EDUCATION/TRAINING PROGRAM

## 2022-03-25 PROCEDURE — 80048 BASIC METABOLIC PNL TOTAL CA: CPT

## 2022-03-25 PROCEDURE — 74011250637 HC RX REV CODE- 250/637: Performed by: NURSE PRACTITIONER

## 2022-03-25 PROCEDURE — 2709999900 HC NON-CHARGEABLE SUPPLY

## 2022-03-25 PROCEDURE — 97116 GAIT TRAINING THERAPY: CPT

## 2022-03-25 PROCEDURE — 99233 SBSQ HOSP IP/OBS HIGH 50: CPT | Performed by: STUDENT IN AN ORGANIZED HEALTH CARE EDUCATION/TRAINING PROGRAM

## 2022-03-25 PROCEDURE — 74011250637 HC RX REV CODE- 250/637: Performed by: HOSPITALIST

## 2022-03-25 PROCEDURE — 94761 N-INVAS EAR/PLS OXIMETRY MLT: CPT

## 2022-03-25 PROCEDURE — 74011000250 HC RX REV CODE- 250: Performed by: PHYSICIAN ASSISTANT

## 2022-03-25 PROCEDURE — 74011250636 HC RX REV CODE- 250/636: Performed by: HOSPITALIST

## 2022-03-25 PROCEDURE — C1751 CATH, INF, PER/CENT/MIDLINE: HCPCS

## 2022-03-25 PROCEDURE — 84100 ASSAY OF PHOSPHORUS: CPT

## 2022-03-25 PROCEDURE — 74011000250 HC RX REV CODE- 250: Performed by: STUDENT IN AN ORGANIZED HEALTH CARE EDUCATION/TRAINING PROGRAM

## 2022-03-25 PROCEDURE — 85027 COMPLETE CBC AUTOMATED: CPT

## 2022-03-25 PROCEDURE — 77030018786 HC NDL GD F/USND BARD -B

## 2022-03-25 PROCEDURE — 83735 ASSAY OF MAGNESIUM: CPT

## 2022-03-25 PROCEDURE — 36573 INSJ PICC RS&I 5 YR+: CPT | Performed by: INTERNAL MEDICINE

## 2022-03-25 RX ORDER — OXYCODONE HYDROCHLORIDE 5 MG/1
5-10 TABLET ORAL
Qty: 42 TABLET | Refills: 0 | Status: SHIPPED | OUTPATIENT
Start: 2022-03-25 | End: 2022-04-01

## 2022-03-25 RX ORDER — AMOXICILLIN 250 MG
1 CAPSULE ORAL 2 TIMES DAILY
Qty: 60 TABLET | Refills: 0 | Status: SHIPPED | OUTPATIENT
Start: 2022-03-25

## 2022-03-25 RX ORDER — ENOXAPARIN SODIUM 100 MG/ML
60 INJECTION SUBCUTANEOUS EVERY 12 HOURS
Qty: 1 EACH | Refills: 0 | Status: SHIPPED | OUTPATIENT
Start: 2022-03-25 | End: 2022-04-15 | Stop reason: ALTCHOICE

## 2022-03-25 RX ORDER — CYCLOBENZAPRINE HCL 10 MG
10 TABLET ORAL
Qty: 30 TABLET | Refills: 0 | Status: SHIPPED | OUTPATIENT
Start: 2022-03-25

## 2022-03-25 RX ADMIN — Medication 10 ML: at 14:00

## 2022-03-25 RX ADMIN — SODIUM CHLORIDE 2 G: 9 INJECTION INTRAMUSCULAR; INTRAVENOUS; SUBCUTANEOUS at 15:20

## 2022-03-25 RX ADMIN — OXYCODONE HYDROCHLORIDE 10 MG: 5 TABLET ORAL at 11:04

## 2022-03-25 RX ADMIN — ENOXAPARIN SODIUM 60 MG: 100 INJECTION SUBCUTANEOUS at 09:16

## 2022-03-25 RX ADMIN — DOCUSATE SODIUM 50 MG AND SENNOSIDES 8.6 MG 1 TABLET: 8.6; 5 TABLET, FILM COATED ORAL at 09:16

## 2022-03-25 RX ADMIN — OXYCODONE HYDROCHLORIDE 10 MG: 5 TABLET ORAL at 04:07

## 2022-03-25 RX ADMIN — FOLIC ACID 1 MG: 1 TABLET ORAL at 09:16

## 2022-03-25 RX ADMIN — POLYETHYLENE GLYCOL 3350 17 G: 17 POWDER, FOR SOLUTION ORAL at 09:16

## 2022-03-25 RX ADMIN — TAMSULOSIN HYDROCHLORIDE 0.4 MG: 0.4 CAPSULE ORAL at 09:16

## 2022-03-25 RX ADMIN — FAMOTIDINE 20 MG: 20 TABLET, FILM COATED ORAL at 09:16

## 2022-03-25 NOTE — PROGRESS NOTES
ORTHOPAEDIC LUMBAR FUSION PROGRESS NOTE    NAME:     Bandar French. :       1950   MRN:       [id number]   DATE:      3/25/2022    POD:    4 Days Post-Op  S/P:    Procedure(s):  SPINE INCISION AND DRAINAGE LUMBAR    SUBJECTIVE:    Patient resting in bed comfortably  Reports frustration in lack of communication between medical and surgical teams  PICC was placed this AM in anticipation of discharge today  Pain controlled on PO medications  Reports feeling ready for discharge home  No additional complaints at this time    Recent Labs     22  0132   HGB 10.7*   HCT 34.0*      K 4.0      CO2 25   BUN 10   CREA 0.81   *     Patient Vitals for the past 12 hrs:   BP Temp Pulse Resp SpO2 Weight   22 1452 (!) 148/77 98.4 °F (36.9 °C) 68 18 96 % --   22 1157 (!) 144/84 97.7 °F (36.5 °C) 66 18 95 % --   22 0815 (!) 124/59 98.1 °F (36.7 °C) 69 18 94 % --   22 0648 -- -- -- -- -- (!) 187 kg (412 lb 4.8 oz)       EXAM:  Positive strength/ROM bilat lower ext. Neuro intact to sensation  OptiFoam dressing clean, dry and intact   Hemovac drain remains in place with minimal output      PLAN:  Continue PO pain medications  PT/OT, OOB-spoke with PT who states patient is safe for home discharge at this time.   Continue regular diet  Discontinue hemovac  Discharge home this afternoon after IV antibiotic teaching completed  Per Dr. Cedrick Valladares to be discharged home on 30 days of Lovenox for DVT ppx  Discharge orders to be placed accordingly    Jo Ann Linder NP

## 2022-03-25 NOTE — PROGRESS NOTES
Problem: Mobility Impaired (Adult and Pediatric)  Goal: *Acute Goals and Plan of Care (Insert Text)  Description: FUNCTIONAL STATUS PRIOR TO ADMISSION: Patient was independent and active without use of DME.    HOME SUPPORT PRIOR TO ADMISSION: The patient lived alone with sister and friends to provide assistance. Physical Therapy Goals  Initiated 3/22/2022  1. Patient will move from supine to sit and sit to supine  in bed with modified independence within 7 day(s). 2.  Patient will transfer from bed to chair and chair to bed with modified independence using the least restrictive device within 7 day(s). 3.  Patient will perform sit to stand with modified independence within 7 day(s). 4.  Patient will ambulate with modified independence for 250 feet with the least restrictive device within 7 day(s). 5.  Patient will ascend/descend 4 stairs with 1 handrail(s) with minimal assistance/contact guard assist within 7 day(s). Note:   PHYSICAL THERAPY TREATMENT  Patient: Patricia Perry (70 y.o. male)  Date: 3/25/2022  Diagnosis: Surgical wound infection [T81.49XA] Postoperative cellulitis of surgical wound  Procedure(s) (LRB):  SPINE INCISION AND DRAINAGE LUMBAR (N/A) 4 Days Post-Op  Precautions: Back  Chart, physical therapy assessment, plan of care and goals were reviewed. ASSESSMENT  Patient continues with skilled PT services. Pt supine to sit to stand with CGA. Pt ambulated 90ft with RW CGA. Pt left sitting in chair. Pt progressing with mobility. Continue goals. PLAN :  Patient continues to benefit from skilled intervention to address the above impairments. Continue treatment per established plan of care. to address goals.     Recommendation for discharge: (in order for the patient to meet his/her long term goals)  Physical therapy at least 2 days/week in the home     This discharge recommendation:  Has been made in collaboration with the attending provider and/or case management    IF patient discharges home will need the following DME: rolling walker       SUBJECTIVE:       OBJECTIVE DATA SUMMARY:   Critical Behavior:  Neurologic State: Alert  Orientation Level: Appropriate for age  Cognition: Appropriate decision making,Appropriate for age attention/concentration  Safety/Judgement: Awareness of environment  Functional Mobility Training:  Bed Mobility:     Supine to Sit: Contact guard assistance              Transfers:  Sit to Stand: Contact guard assistance  Stand to Sit: Contact guard assistance                             Balance:  Sitting: Intact  Standing: With support  Ambulation/Gait Training:  Distance (ft): 90 Feet (ft)  Assistive Device: Gait belt;Walker, rolling  Ambulation - Level of Assistance: Contact guard assistance        Gait Abnormalities: Decreased step clearance              Speed/Ginny: Pace decreased (<100 feet/min)  Step Length: Right shortened;Left shortened            Activity Tolerance:   Fair to good    After treatment patient left in no apparent distress:   Sitting in chair    COMMUNICATION/COLLABORATION:   The patients plan of care was discussed with: Physical therapist.     Anika Dennis PTA   Time Calculation: 23 mins

## 2022-03-25 NOTE — PROGRESS NOTES
PICC Placement Note    PRE-PROCEDURE VERIFICATION  Correct Procedure: yes  Correct Site:  yes  Temperature: Temp: 98.1 °F (36.7 °C), Temperature Source: Temp Source: Oral  Recent Labs     03/25/22  0132   BUN 10   CREA 0.81      WBC 8.1     Allergies: Suprax [cefixime]  Education materials for PICC Care given: yes. See Patient Education activity for further details. PICC Booklet placed at bedside: yes    Closed Ended PICC Catheters:  Flush Lumens as Follows:  Intermittent Medication:   Flush before and after each medication with 10 ml NS. Unused Ports:  Flush every 8 hours with 10 ml NS.  TPN Ports:  Flush every 24 hours with 20 ml NS prior to hanging new bag. Blood Draws: Stop infusion, draw off and waste 10 ml of blood. Draw sample with 10cc syringe or greater. DO NOT USE VACUTAINER . Transfer with appropriate device to lab  tubes. Flush with 20 ml NS. Dressing Change:  Every 7 days, and PRN using sterile technique if integrity of dressing is compromised. Initial dressing change for central line 24-48 hours post insertion if gauze is used. Apply new dressing per policy. PROCEDURE DETAIL  Consent was obtained and all questions were answered related to risks and benefits. A single lumen PICC line was inserted, as a sterile procedure using ultrasound and modified Seldinger technique for antibiotic therapy. The following documentation is in addition to the PICC properties in the lines/airways flowsheet :  Lot #: [id number]  Lidocaine 1% administered intradermally :yes  Internal Catheter Total Length:  52cm with 1cm out  Vein Selection for PICC:right basilic  Central Line Bundle followed yes  Complication Related to Insertion:no    The placement was verified by EKG, MAX P WAVE @ 52cm with 1cm out. PER EKG PICC TIP @ C/A junction.       X-ray: no.    Line is okay to use: yes    Dana Coates RN

## 2022-03-25 NOTE — PROGRESS NOTES
ID:     Spoke to nursing today - patient tolerating ceftriaxone well. 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 - Ortho follow up per Orthopedics      · Please have labs done on weekly basis for CBC with diff, CMP, ESR, CRP  and have results sent to me by faxing to [phone]. ·   Call Critical Lab Values to [phone]  ·  May send to IR for line evaluation or replacement [phone]  ·  Home Health to pull PIC line at end of therapy or send to IR for Lan removal  · Please ensure that patient has PICC care arranged per protocol         ID will sign off now, please recall as needed.          Lynnann Ormond, MD  Infectious Diseases

## 2022-03-25 NOTE — PROGRESS NOTES
3/25/2022   CARE MANAGEMENT NOTE:  CM reviewed EMR.  Pt was admitted with post surgical wound/cellulitis/partial wound dehiscience. Reportedly, pt resides alone. Sister Arthuro Favre ([phone]) is a primary family contact.     RUR 10%; LOS 4 days     Transition Plan of Care:  1.  Ortho, ID following for medical management - pt will require home IV ABX (Ceftriaxone 2 gm IV daily until 5/2/22). Adilene Granda [id number] will provide teaching to pt at 3:15 p.m. today. RN, please give pt today's hospital dose before discharge. 2.  [facility] [address] (skilled nursing, PT, OT) has been arranged  3.  Outpt f/u  4.  Family will transport pt home     No further post discharge needs indicated.   Salma

## 2022-03-25 NOTE — DISCHARGE INSTRUCTIONS
Lumbar Spinal Surgery Discharge Instructions   Dr. Schaffer Memory  [phone]    Activity:    [facility] You are going home a well person, be as active as possible. Your only exercise should be walking. Start with short frequent walks and increase your walking distance each day. Start with walking twice a day for 5 minutes and increase your distance each day 2-3 minutes until you reach 25 minutes twice a day. Limit the amount of time you sit to 20-30 minute intervals. Sitting for prolonged periods of time will be uncomfortable for you following your surgery.  No bending, lifting (of 5lbs or more), twisting, or straining.  Do not drive until your doctor states you may do so. However, you may ride in a car for short distances.  If you are required to wear a brace, you should wear it at all times when you are out of bed. You may remove it when sleeping unless your physician advises you against it.  When you are in the bed, you may lay on your back or on either side. Do not lie on your stomach.  You may resume sexual relations 3-4 weeks after surgery depending on how you are feeling.  Continue to use your incentive spirometer for deep breathing exercises. Driving:   You may not drive or return to work until instructed by your physician. However, you may ride in the car for short periods of time. Brace:   If you have a back brace, you should wear your brace at all times when you are out of bed. Do not wear the brace while in bed or showering.  Remember to always wear a cotton t-shirt underneath your brace.  Do not bend or twist when your brace is off. Diet:   You may resume your regular home diet as tolerated. If your throat is sore, you should eat soft foods for the first couple of days.  Be sure to drink plenty of fluids; it is important to keep yourself hydrated.  Avoid alcoholic beverages and ABSOLUTELY NO tobacco products.  Tobacco products will interfere with your healing. If you continue to use tobacco, you may end up needing another surgery in the future. Dressing:  -You have an OptiFoam dressing. This is waterproof and you may shower with it on. This should be removed 7 days after surgery.           -Underneath the OptiFoam, you have surgical glue (Dermabond) covering the incision. This is waterproof and requires no post-operative care. Please do not pick at or peel the glue off, or apply any oil based products, as they may expedite the deterioration of the glue. The Dermabond will slowly come off on its own.  Do not rub or apply lotion or ointments to incision site. Shower:   You may remove your brace during showers.  NO not use tub baths, swimming pools or Jacuzzis. Medications:   Check with your physician before taking any anti-inflammatory medications. (Advil, Aleve, Ibuprofen, Aspirin)   Take your pain medication as directed   Do NOT take additional Tylenol if your prescribed pain medication has acetaminophen in it (Endocet/Percocet, Lortab, Norco)   It is important to have regular bowel movements. Pain medications can be constipating. Stool softeners, warm prune juice, increasing your water intake, and increasing fiber in your diet can help in preventing constipation.  Do NOT take laxatives if at all possible except in severe situations. It can result in a vicious cycle of constipation and diarrhea.  You will be receiving an IV antibiotic (Ceftriaxone) for 6 weeks per ID recommendations. Follow-Up    Please call ASAP to schedule your 1st post-operative appointment. This should be approximately 3 weeks from your surgery date.  Make an appointment to see Dr. Maria Fernanda Ferguson within 2-3 weeks of discharge. Notify your physician in you develop any of the following conditions:   Fever above 101 degrees for 24 hours.  Nausea or vomiting.  Severe headache.    Inability to urinate   Loss of bowel or bladder function (sudden onset of incontinence)   Changes in sensation in your extremities (numbness, tingling, loss of color).  Severe pain or pain not relieved by medications.  Redness, swelling, or drainage from your incision.  Persistent pain in the chest.    Pain in the calf of either leg.  Increased weakness (if this is greater than before your surgery). If you have any questions about your dressing contact your Orthopaedic Surgeons office. OFFICE OF DR. Bam Willingham   [phone]  OUR NEW ADDRESS IS [address] [place] [address] [address], [address] [facility]     ** IMPORTANT: UNDER YOUR OPTIFOAM DRESSING, YOU HAVE AN ADHESIVE GLUE (DERMABOND) DIRECTLY OVER YOUR INCISION. THIS WAS STERILELY GLUED TO YOUR SKIN DURING SURGERY. DO NOT REMOVE THIS. NO ONE ELSE SHOULD REMOVE IT EITHER. IT WILL COME OFF ON ITS OWN OVER TIME    YOUR OPTIFOAM DRESSING SHOULD REMAIN IN PLACE FOR 1 WEEK. IT IS A WATERPROOF DRESSING AS LONG AS IT'S PLACEMENT IS INTACT. YOU CAN SHOWER AS INDICATED BELOW IN YOUR DISCHARGE INSTRUCTIONS    * WEAR YOUR BRACE AS ADVISED    * NO DRIVING UNTIL YOU ARE CLEARED TO DO SO BY YOUR SURGEON    * LIMIT LIFTING, BENDING AND TWISTING.  NO LIFTING MORE THAN 5 LBS    * MAKE SURE YOU ARE GETTING GOOD NUTRITION (Lean Protein, Vitamin D AND Calcium)    * DO NOT TAKE ANY NSAIDs FOR THE FIRST 3 MONTHS AFTER SURGERY (such as Advil/Ibuprofen/Motrin, Aleve/Naproxen/Naprosyn, Diclofenac, Celebrex, Meloxicam, Indomethacin, Goody's powder, BC powder etc.)    * NO NICOTINE PRODUCTS    * FULLY READ YOUR DISCHARGE INSTRUCTIONS

## 2022-03-25 NOTE — PROGRESS NOTES
David Nava [facility] [address]  [address] [facility], [address] [place] [address]  [phone]      Medical Progress Note      NAME: Tita Hernandez. :  1950  MRM:  [id number]    Date/Time of service: 3/25/2022  12:10 PM       Subjective:     Chief Complaint:  Patient was personally seen and examined by me during this time period. Chart reviewed. S/p PICC line. Patient is being discharged by Ortho        Objective:       Vitals:       Last 24hrs VS reviewed since prior progress note.  Most recent are:    Visit Vitals  BP (!) 144/84 (BP 1 Location: Left upper arm, BP Patient Position: At rest)   Pulse 66   Temp 97.7 °F (36.5 °C)   Resp 18   Ht 6' 8\" (2.032 m)   Wt (!) 187 kg (412 lb 4.8 oz)   SpO2 95%   BMI 45.29 kg/m²     SpO2 Readings from Last 6 Encounters:   22 95%   22 95%   22 96%   22 96%   22 96%   21 97%    O2 Flow Rate (L/min): 2 l/min       Intake/Output Summary (Last 24 hours) at 3/25/2022 1210  Last data filed at 3/25/2022 0800  Gross per 24 hour   Intake 2110 ml   Output 3265 ml   Net -1155 ml        Exam:     Physical Exam:    Gen:  Well-developed, well-nourished, morbidly obese, in no acute distress  HEENT:  Pink conjunctivae, PERRL, hearing intact to voice, moist mucous membranes  Neck:  Supple, without masses, thyroid non-tender  Resp:  No accessory muscle use, clear breath sounds without wheezes rales or rhonchi  Card:  No murmurs, normal S1, S2 without thrills, bruits or peripheral edema  Abd:  Soft, non-tender, non-distended, normoactive bowel sounds are present  Musc:  No cyanosis or clubbing  Skin:  Has drain to surgical wound   Neuro:  Cranial nerves 3-12 are grossly intact, follows commands appropriately  Psych:  Good insight, oriented to person, place and time, alert    Medications Reviewed: (see below)    Lab Data Reviewed: (see below)    ______________________________________________________________________    Medications: Current Facility-Administered Medications   Medication Dose Route Frequency    alteplase (CATHFLO) 1 mg in sterile water (preservative free) 1 mL injection  1 mg InterCATHeter PRN    cefTRIAXone (ROCEPHIN) 2 g in 0.9% sodium chloride 20 mL IV syringe  2 g IntraVENous Q24H    famotidine (PEPCID) tablet 20 mg  20 mg Oral BID    alum-mag hydroxide-simeth (MYLANTA) oral suspension 30 mL  30 mL Oral QID PRN    sodium chloride (NS) flush 5-40 mL  5-40 mL IntraVENous Q8H    sodium chloride (NS) flush 5-40 mL  5-40 mL IntraVENous PRN    naloxone (NARCAN) injection 0.4 mg  0.4 mg IntraVENous PRN    senna-docusate (PERICOLACE) 8.6-50 mg per tablet 1 Tablet  1 Tablet Oral BID    polyethylene glycol (MIRALAX) packet 17 g  17 g Oral DAILY    bisacodyL (DULCOLAX) suppository 10 mg  10 mg Rectal DAILY PRN    diphenhydrAMINE (BENADRYL) injection 25 mg  25 mg IntraVENous Q6H PRN    oxyCODONE IR (ROXICODONE) tablet 5 mg  5 mg Oral Q3H PRN    oxyCODONE IR (ROXICODONE) tablet 10 mg  10 mg Oral Q3H PRN    zolpidem (AMBIEN) tablet 5 mg  5 mg Oral QHS PRN    simethicone (MYLICON) tablet 80 mg  80 mg Oral QID PRN    cyclobenzaprine (FLEXERIL) tablet 10 mg  10 mg Oral TID PRN    folic acid (FOLVITE) tablet 1 mg  1 mg Oral DAILY    tamsulosin (FLOMAX) capsule 0.4 mg  0.4 mg Oral DAILY    sodium chloride (NS) flush 5-40 mL  5-40 mL IntraVENous Q8H    sodium chloride (NS) flush 5-40 mL  5-40 mL IntraVENous PRN    acetaminophen (TYLENOL) tablet 650 mg  650 mg Oral Q6H PRN    Or    acetaminophen (TYLENOL) suppository 650 mg  650 mg Rectal Q6H PRN    ondansetron (ZOFRAN ODT) tablet 4 mg  4 mg Oral Q8H PRN    Or    ondansetron (ZOFRAN) injection 4 mg  4 mg IntraVENous Q6H PRN    enoxaparin (LOVENOX) injection 60 mg  60 mg SubCUTAneous Q12H    melatonin tablet 3 mg  3 mg Oral QHS PRN          Lab Review:     Recent Labs     03/25/22  0132 03/24/22  0022 03/23/22  0302   WBC 8.1 7.2 8.3   HGB 10.7* 10.2* 10.7*   HCT 34.0* 32.2* 34.4*    336 351     Recent Labs     03/25/22  0132 03/23/22  0302    138   K 4.0 4.2    105   CO2 25 29   * 118*   BUN 10 17   CREA 0.81 0.77   CA 8.5 8.5   MG 2.0 2.2   PHOS 3.3 2.2*     No results found for: GLUCPOC       Assessment / Plan:     Principal Problem:    79 yo hx of RA, ankylosing spondylitis, lumbar stenosis s/p recent L2-L5 lamicectomy, presented w/ wound dehiscence, post-op wound infection, s/p I&D 03/21. Medicine is following as a consultant    1) Postoperative cellulitis of surgical wound/abscess/dehiscence of wound: immunocompromised on MTX and Remicade. S/p I&D by Ortho on 03/21. Wound Cx growing staph Epi and Group B strep. ID recommended IV CTX via PICC until 05/02 (I already ordered the PICC). Rest of management per Ortho    2) RA/ankylosing spondylitis: holding home MTX while here, will defer to Rheumatologist    3) Morbid obesity: recommended diet, wt loss, exercise    4) Hypophos: repleted IV, monitor prn     **Patient is medically stable. Will sign off.   Thank you for consult**    Total time spent with patient: 20 min **I personally saw and examined the patient during this time period**                 Care Plan discussed with: Patient, nursing, CM    Discussed:  Care Plan    Prophylaxis:  Lovenox , SCDs    Disposition:  per primary team           ___________________________________________________    Attending Physician: Blossom Cason MD

## 2022-03-25 NOTE — PROGRESS NOTES
Patient being discharged at this time. PICC line to remain in place for at home antibiotic therapy. Hemovac drain removed per MD order. Scant amount of serosanguinous drainage appeared. Placed sterile guaze and bordered dressing over two drain sites and instructed patient to remove this tomorrow and is only there to catch any drainage. The other dressing for his lumbar surgery is to remain in place for one week post surgery. Patient verbalized understanding of all. To report to MD or hospital for any fever, s/s of infection, increase in pain. Transport via  to car. Sister driving him home.     [place], [address] [facility]

## 2022-03-26 LAB
BACTERIA SPEC CULT: NORMAL
SERVICE CMNT-IMP: NORMAL

## 2022-03-26 NOTE — PROGRESS NOTES
Physician Progress Note      PATIENT:               Dmitri Mujica  CSN #:                  [id number]  :                       1950  ADMIT DATE:       3/20/2022 2:32 PM  [address] DATE:        3/25/2022 5:20 PM  RESPONDING  PROVIDER #:        March Spatz PA          QUERY TEXT:    Dear Attending,    Pt admitted with surgical wound infection, noted to have low serum sodium on admission. If possible, please document in the progress notes and discharge summary if you are evaluating and / or treating any of the following: The medical record reflects the following:  Risk Factors: acute illness, advanced age, diuretic PTA med  Clinical Indicators: Sodium: 132 on admission and 134 on 3/22  Treatment: NS bolus (1L), IVFs NS 75 ml/hr, monitoring      Thank you,    Hitesh Rodriguez RN  [facility]  [phone]  Options provided:  -- Hyponatremia  -- Clinically insignificant low serum sodium  -- Other - I will add my own diagnosis  -- Disagree - Not applicable / Not valid  -- Disagree - Clinically unable to determine / Unknown  -- Refer to Clinical Documentation Reviewer    PROVIDER RESPONSE TEXT:    This patient has low serum sodium which is clinically insignificant.     Query created by: Kat Cardenas on 3/23/2022 12:50 PM      Electronically signed by:  March Spatz PA 3/26/2022 6:24 PM

## 2022-03-27 NOTE — DISCHARGE SUMMARY
DISCHARGE SUMMARY     Patient: Tonya Mchugh Medical Record Number: [id number]                : 1950  Age: 80 y.o. Admit Date: 3/20/2022  Discharge Date: 3/25/2022  Admission Diagnosis: Surgical wound infection [T81.49XA]  Discharge Diagnosis: post op infection  Procedures: Procedure(s):  SPINE INCISION AND DRAINAGE LUMBAR  Surgeon: Alexei Medel MD  Co-surgeon:   Assistants:   Anesthesia: General  Complications: None     History of Present Illness:  Tonya Mchugh is a 80 y.o. male with a history of undergoing a L2-L5 Laminecomty performed by Dr. Cat Fang on 2/15/22. He developed chills, elevated temp of 99 F, body aches on 3/14/22. His postoperative low back pain progressively worsened. He noticed having purulent drainage on his bed sheets on 3/20/22 which prompted him to go to the ED. He was subsequently readmitted to the hospital by the Hospitalist team with Orthopedic consultation. After lab work and lumbar CT, it was determined that he had developed a lumbar surgical site infection. He was started on IV Vancomycin and IV Zosyn  He was taken to the OR on 3/21/22 for an Incision and Drainage. During surgery, OR cultures were collected. Hospital Course: Tonya Mchugh tolerated the procedure well. He was transferred to the recovery room in stable condition. After a brief stay, the patient was then transferred to the Medical/Surgical floor. On postoperative day #1, the dressing was clean and dry and he was neurovascularly intact. The patient was afebrile and vital signs were stable. Calves were soft and non-tender bilaterally. During admission, patient was evaluated by Infectious Disease who followed the OR cultures and recommended treatment with IV antibiotics. A PICC line was placed during admission. Tonya Mchugh made excellent progress with treatment and was discharged Home with Home Health in stable condition on postoperative day 4.  He was provided with routine postoperative instructions and advised to follow up in my office in 3 weeks following discharge from the hospital. He was given prescriptions for medication to control post-operative symptoms. Henok Angel will be followed by our office as well as Infectious Disease during his outpatient treatment. Discharge Medications:  Discharge Medication List as of 3/25/2022  3:55 PM      START taking these medications    Details   enoxaparin (LOVENOX) 60 mg/0.6 mL injection 60 mg by SubCUTAneous route every twelve (12) hours for 30 days. , Normal, Disp-1 Each, R-0      oxyCODONE IR (ROXICODONE) 5 mg immediate release tablet Take 1-2 Tablets by mouth every four (4) hours as needed for Pain for up to 7 days. Max Daily Amount: 60 mg., Normal, Disp-42 Tablet, R-0      senna-docusate (PERICOLACE) 8.6-50 mg per tablet Take 1 Tablet by mouth two (2) times a day., Normal, Disp-60 Tablet, R-0         CONTINUE these medications which have CHANGED    Details   cyclobenzaprine (FLEXERIL) 10 mg tablet Take 1 Tablet by mouth three (3) times daily as needed for Muscle Spasm(s). , Normal, Disp-30 Tablet, R-0Call [phone] with questions         CONTINUE these medications which have NOT CHANGED    Details   furosemide (LASIX) 20 mg tablet TAKE 1 TABLET BY MOUTH DAILY AS NEEDED (FOR SWELLING (EDEMA). TAKE FOR 3 DAYS MAX). , Normal, Disp-90 Tablet, R-0      cyanocobalamin, vitamin B-12, (VITAMIN B-12 PO) Take 2,500 mcg by mouth daily. , Historical Med      cetirizine (ZyrTEC) 10 mg tablet Take 10 mg by mouth as needed for Allergies. , Historical Med      valsartan-hydroCHLOROthiazide (DIOVAN-HCT) 160-12.5 mg per tablet TAKE 1 TABLET BY MOUTH EVERY DAY, Normal, Disp-90 Tablet, R-3      tamsulosin (FLOMAX) 0.4 mg capsule Take 0.4 mg by mouth daily. , Historical Med      folic acid (FOLVITE) 1 mg tablet TAKE 1 TABLET BY MOUTH EVERY DAY, Historical Med      augmented betamethasone dipropionate (DIPROLENE-AF) 0.05 % ointment Apply  to affected area as needed for Skin Irritation. , Historical Med         STOP taking these medications       methotrexate, PF, 25 mg/mL injection Comments:   Reason for Stopping:         docusate sodium (COLACE) 100 mg capsule Comments:   Reason for Stopping:               NAOMI Solomon  3/25/2022  Orthopaedic Surgery  Physician Assistant to Dr. Raymundo Bolds

## 2022-04-06 ENCOUNTER — OFFICE VISIT (OUTPATIENT)
Dept: INFECTIOUS DISEASES | Age: 72
End: 2022-04-06
Payer: MEDICARE

## 2022-04-06 VITALS
OXYGEN SATURATION: 97 % | WEIGHT: 315 LBS | HEART RATE: 121 BPM | TEMPERATURE: 97.6 F | BODY MASS INDEX: 40.54 KG/M2 | SYSTOLIC BLOOD PRESSURE: 108 MMHG | DIASTOLIC BLOOD PRESSURE: 70 MMHG | RESPIRATION RATE: 20 BRPM

## 2022-04-06 DIAGNOSIS — T81.42XA DEEP POSTOPERATIVE WOUND INFECTION: Primary | ICD-10-CM

## 2022-04-06 PROCEDURE — G8754 DIAS BP LESS 90: HCPCS | Performed by: INTERNAL MEDICINE

## 2022-04-06 PROCEDURE — G8432 DEP SCR NOT DOC, RNG: HCPCS | Performed by: INTERNAL MEDICINE

## 2022-04-06 PROCEDURE — 99213 OFFICE O/P EST LOW 20 MIN: CPT | Performed by: INTERNAL MEDICINE

## 2022-04-06 PROCEDURE — 1101F PT FALLS ASSESS-DOCD LE1/YR: CPT | Performed by: INTERNAL MEDICINE

## 2022-04-06 PROCEDURE — 3017F COLORECTAL CA SCREEN DOC REV: CPT | Performed by: INTERNAL MEDICINE

## 2022-04-06 PROCEDURE — G8417 CALC BMI ABV UP PARAM F/U: HCPCS | Performed by: INTERNAL MEDICINE

## 2022-04-06 PROCEDURE — 1111F DSCHRG MED/CURRENT MED MERGE: CPT | Performed by: INTERNAL MEDICINE

## 2022-04-06 PROCEDURE — G8752 SYS BP LESS 140: HCPCS | Performed by: INTERNAL MEDICINE

## 2022-04-06 PROCEDURE — G8427 DOCREV CUR MEDS BY ELIG CLIN: HCPCS | Performed by: INTERNAL MEDICINE

## 2022-04-06 PROCEDURE — G8536 NO DOC ELDER MAL SCRN: HCPCS | Performed by: INTERNAL MEDICINE

## 2022-04-06 NOTE — PROGRESS NOTES
Chief Complaint   Patient presents with   [facility] Follow Up     1. Have you been to the ER, urgent care clinic since your last visit? Hospitalized since your last visit? Yes, [facility], back infection/surgery. 2. Have you seen or consulted any other health care providers outside of the [address] [place] [address] since your last visit? Include any pap smears or colon screening.  No

## 2022-04-14 ENCOUNTER — TELEPHONE (OUTPATIENT)
Dept: FAMILY MEDICINE CLINIC | Age: 72
End: 2022-04-14

## 2022-04-14 NOTE — TELEPHONE ENCOUNTER
Patient has developed a rash mainly involving the right arm. He describes it as a splotchy red rash. We will change antibiotics to Invanz to complete course.   This was discussed with patient who is in agreement

## 2022-04-14 NOTE — TELEPHONE ENCOUNTER
Graylin Phoenix called from [phone] to advise Dr. Corene Duverney pt is requesting call back to discuss what's coming down the line (future plan of care?) at [phone].  Ldm

## 2022-04-15 ENCOUNTER — OFFICE VISIT (OUTPATIENT)
Dept: INTERNAL MEDICINE CLINIC | Age: 72
End: 2022-04-15
Payer: MEDICARE

## 2022-04-15 VITALS
RESPIRATION RATE: 15 BRPM | DIASTOLIC BLOOD PRESSURE: 70 MMHG | HEIGHT: 78 IN | BODY MASS INDEX: 36.45 KG/M2 | SYSTOLIC BLOOD PRESSURE: 101 MMHG | OXYGEN SATURATION: 95 % | WEIGHT: 315 LBS | TEMPERATURE: 98.2 F | HEART RATE: 115 BPM

## 2022-04-15 DIAGNOSIS — Z98.890 S/P LUMBAR LAMINECTOMY: ICD-10-CM

## 2022-04-15 DIAGNOSIS — I10 BENIGN ESSENTIAL HTN: ICD-10-CM

## 2022-04-15 DIAGNOSIS — M25.532 ACUTE PAIN OF LEFT WRIST: ICD-10-CM

## 2022-04-15 DIAGNOSIS — M45.0 ANKYLOSING SPONDYLITIS OF MULTIPLE SITES IN SPINE (HCC): ICD-10-CM

## 2022-04-15 DIAGNOSIS — R06.00 DYSPNEA, UNSPECIFIED TYPE: ICD-10-CM

## 2022-04-15 DIAGNOSIS — T81.49XA POSTOPERATIVE CELLULITIS OF SURGICAL WOUND: Primary | ICD-10-CM

## 2022-04-15 PROCEDURE — G8417 CALC BMI ABV UP PARAM F/U: HCPCS | Performed by: INTERNAL MEDICINE

## 2022-04-15 PROCEDURE — G8427 DOCREV CUR MEDS BY ELIG CLIN: HCPCS | Performed by: INTERNAL MEDICINE

## 2022-04-15 PROCEDURE — G8510 SCR DEP NEG, NO PLAN REQD: HCPCS | Performed by: INTERNAL MEDICINE

## 2022-04-15 PROCEDURE — G8536 NO DOC ELDER MAL SCRN: HCPCS | Performed by: INTERNAL MEDICINE

## 2022-04-15 PROCEDURE — 99214 OFFICE O/P EST MOD 30 MIN: CPT | Performed by: INTERNAL MEDICINE

## 2022-04-15 PROCEDURE — 1101F PT FALLS ASSESS-DOCD LE1/YR: CPT | Performed by: INTERNAL MEDICINE

## 2022-04-15 PROCEDURE — 3017F COLORECTAL CA SCREEN DOC REV: CPT | Performed by: INTERNAL MEDICINE

## 2022-04-15 PROCEDURE — G8752 SYS BP LESS 140: HCPCS | Performed by: INTERNAL MEDICINE

## 2022-04-15 PROCEDURE — G0463 HOSPITAL OUTPT CLINIC VISIT: HCPCS | Performed by: INTERNAL MEDICINE

## 2022-04-15 PROCEDURE — G8754 DIAS BP LESS 90: HCPCS | Performed by: INTERNAL MEDICINE

## 2022-04-15 PROCEDURE — 1111F DSCHRG MED/CURRENT MED MERGE: CPT | Performed by: INTERNAL MEDICINE

## 2022-04-15 RX ORDER — DICLOFENAC SODIUM 75 MG/1
75 TABLET, DELAYED RELEASE ORAL 2 TIMES DAILY
Qty: 60 TABLET | Refills: 0
Start: 2022-04-15

## 2022-04-15 NOTE — PATIENT INSTRUCTIONS
Buy Omron BP cuff for arm. Stop valsartan - hctz (Diovan-hctz) for now. If BP is > 140/90, then res-start med.

## 2022-04-15 NOTE — PROGRESS NOTES
HISTORY OF PRESENT ILLNESS    Chief Complaint   Patient presents with   [facility] Follow Up     [facility] - 2 back surgeries f/u - Dr. Brian Irizarry - bandage keeps rolling off.  Medication Evaluation     antibiotic    Wrist Pain     left       Presents for follow-up      Admit Date: 3/20/2022  Discharge Date: 3/25/2022  Admission Diagnosis: Surgical wound infection   L2-L5 Laminecomty performed by Dr. Temo Gibson on 2/15/22. He developed chills, elevated temp of 99 F, body aches on 3/14/22. His postoperative low back pain progressively worsened. He noticed having purulent drainage on his bed sheets on 3/20/22 which prompted him to go to the ED. He was subsequently readmitted to the hospital by the Hospitalist team with Orthopedic consultation.      After lab work and lumbar CT, it was determined that he had developed a lumbar surgical site infection. He was started on IV Vancomycin and IV Zosyn  He was taken to the OR on 3/21/22 for an Incision and Drainage. During surgery, OR cultures were collected.      Receiving ceftriaxone via R arm PICC since 3/25/22. Has rash, fatigue, decreased appetite, decreased taste. Starting new antibiotic today Invanz untiil 5/2/22 per Dr. Reuben Leblanc care nurse comes once per week and also to do blood work. .    Bandage is rolling up today    Staples will be removed next week. Left wrist pain. Off diclofenac since hospital.  He is getting lovenox injections for unclear reason since it has been over 3 weeks since his surgery. Lab Results   Component Value Date/Time    Creatinine 0.81 03/25/2022 01:32 AM     Back pain is improving. Denies any fevers or chills. He is changing his bandage without any significant drainage. Walking w rolling walker.   Sister drove him here    Review of Systems   All other systems reviewed and are negative, except as noted in HPI    Past Medical and Surgical History   has a past medical history of Abnormal stress test (12/2021), Ankylosing spondylitis ([facility] [address] (1977), Benign essential HTN, BPH (benign prostatic hyperplasia), Chronic edema, Colon polyp, COVID-19 vaccine series completed, DJD (degenerative joint disease) of cervical spine, KING (dyspnea on exertion), History of basal cell cancer (2013), History of bilateral knee replacement, History of DVT (deep vein thrombosis), History of vascular access device (03/25/2022), Insomnia, Kidney stone, Lumbar spinal stenosis (2011), Neuropathy, JUDY on CPAP, Psoriasis, Rheumatoid arthritis of cervical spine ([facility] [address] and SOB (shortness of breath) (2014). has a past surgical history that includes hx shoulder arthroscopy (Left); hx wrist fracture tx (Right); hx lithotripsy; hx knee replacement (Bilateral); hx mohs procedure (2013?); hx colonoscopy (10/15/2019); hx colonoscopy; hx heart catheterization (2014); hx heart catheterization (01/03/2022); and hx lumbar laminectomy (2022). reports that he quit smoking about 36 years ago. His smoking use included cigarettes. He has never used smokeless tobacco. He reports current alcohol use. He reports that he does not use drugs. family history includes Heart Disease in his father; OSTEOARTHRITIS in his father and sister. Physical Exam   Nursing note and vitals reviewed. Blood pressure 101/70, pulse (!) 115, temperature 98.2 °F (36.8 °C), temperature source Oral, resp. rate 15, height 6' 8\" (2.032 m), weight (!) 367 lb (166.5 kg), SpO2 95 %. Constitutional:  No distress. Walking with a Rollator  Eyes: Conjunctivae are normal.   Ears:  Hearing grossly intact  Cardiovascular: Normal rate. regular rhythm, no murmurs or gallops  No edema  Lumbar wound with healing and no significant erythema or drainage. Staples in place. Pulmonary/Chest: Effort normal.   CTAB  Musculoskeletal: moves all 4 extremities   Neurological: Alert and oriented to person, place, and time. Skin: No visible rash noted. Psychiatric: Normal mood and affect.  Behavior is normal. Assessment and Plan    Diagnoses and all orders for this visit:    1. Postoperative cellulitis of surgical wound  2. S/P lumbar laminectomy  Wound appears to be healing. He is receiving IV antibiotics and managed by infectious disease. No evidence of infection or surgical wound drainage. 3. Acute pain of left wrist  Likely secondary to inflammatory arthritis. He will follow up with rheumatology. Defer restarting methotrexate dose for now due to risk of infection. He can start NSAIDs again and stop Lovenox as far as I can tell. Risk of DVT PE is relatively low this far out.  -     diclofenac EC (VOLTAREN) 75 mg EC tablet; Take 1 Tablet by mouth two (2) times a day. 4. Ankylosing spondylitis of multiple sites in spine [facility])  Follow-up with rheumatology. Seeing Dr. Yokasta Church. May be able to resume medication soon. 5. Dyspnea, unspecified type  Chronic dyspnea. Perhaps a little bit above baseline. Deconditioning? No evidence of severe anemia postoperatively. Last hemoglobin was 10.7 on March 25. He is having blood work done by infectious disease weekly which I assume might include a hemoglobin. 6. Benign essential HTN  Blood pressure is a bit low. Recommend holding valsartan hydrochlorothiazide for now. Monitor blood pressures at home. Can resume half dose if blood pressures begin to increase. There are no Patient Instructions on file for this visit.    lab results and schedule of future lab studies reviewed with patient  reviewed medications and side effects in detail    Return to clinic for further evaluation if new symptoms develop        Current Outpatient Medications   Medication Sig    cyclobenzaprine (FLEXERIL) 10 mg tablet Take 1 Tablet by mouth three (3) times daily as needed for Muscle Spasm(s).  enoxaparin (LOVENOX) 60 mg/0.6 mL injection 60 mg by SubCUTAneous route every twelve (12) hours for 30 days.     senna-docusate (PERICOLACE) 8.6-50 mg per tablet Take 1 Tablet by mouth two (2) times a day.  furosemide (LASIX) 20 mg tablet TAKE 1 TABLET BY MOUTH DAILY AS NEEDED (FOR SWELLING (EDEMA). TAKE FOR 3 DAYS MAX).  cyanocobalamin, vitamin B-12, (VITAMIN B-12 PO) Take 2,500 mcg by mouth daily.  cetirizine (ZyrTEC) 10 mg tablet Take 10 mg by mouth as needed for Allergies.  valsartan-hydroCHLOROthiazide (DIOVAN-HCT) 160-12.5 mg per tablet TAKE 1 TABLET BY MOUTH EVERY DAY    tamsulosin (FLOMAX) 0.4 mg capsule Take 0.4 mg by mouth daily.  folic acid (FOLVITE) 1 mg tablet TAKE 1 TABLET BY MOUTH EVERY DAY    augmented betamethasone dipropionate (DIPROLENE-AF) 0.05 % ointment Apply  to affected area as needed for Skin Irritation. No current facility-administered medications for this visit.

## 2022-05-02 ENCOUNTER — TELEPHONE (OUTPATIENT)
Dept: FAMILY MEDICINE CLINIC | Age: 72
End: 2022-05-02

## 2022-05-02 NOTE — TELEPHONE ENCOUNTER
Discussed with [facility]. Okay to stop antibiotics. Labs are stable.   Inflammatory markers will be elevated in the setting of psoriatic arthritis

## 2022-05-02 NOTE — TELEPHONE ENCOUNTER
Adilene Granda [phone] called in regards to pt. On call advised pt's PICC came out last night, end of therapy is scheduled for today. Pt possible missed last night and this morning. Does pt need additional dosage. [phone].

## 2022-07-27 ENCOUNTER — TELEPHONE (OUTPATIENT)
Dept: INTERNAL MEDICINE CLINIC | Age: 72
End: 2022-07-27

## 2022-07-27 NOTE — TELEPHONE ENCOUNTER
Recommend nasal saline and then Afrain nasal spray twice daily to relieve congestion.   Appt needed for eval prn no imprvoement

## 2022-07-27 NOTE — TELEPHONE ENCOUNTER
Spoke with patient and he reports that he has had sinus drainage from just one nostril - clear with slight cough but no congestion for the last week. He believes he has a sinus infection that is not improving. He is asking for some kind of treatment. Dr. Krishna Gregg notified.

## 2022-07-27 NOTE — TELEPHONE ENCOUNTER
----- Message from Alejandro Peralta sent at 7/27/2022  2:27 PM EDT -----  Subject: Message to Provider    QUESTIONS  Information for Provider? Patient states he has a sinus infection and   wants something called in to pharmacy. [facility] [phone]. Please let   patient know when the prescription is called in.   ---------------------------------------------------------------------------  --------------  [id number] [employer]  [phone]; OK to leave message on voicemail  ---------------------------------------------------------------------------  --------------  SCRIPT ANSWERS  Relationship to Patient?  Self

## 2022-07-28 NOTE — TELEPHONE ENCOUNTER
Spoke with patient and updated on Dr. Kim Song comments and recommendations for his nasal congestion he states understanding. He also reports that he did perform a home COVID test and it was negative. Advised to call and schedule an appt if no improvement in the next 5 days. He states understanding. Grateful for the call.

## 2022-08-03 ENCOUNTER — TELEPHONE (OUTPATIENT)
Dept: INTERNAL MEDICINE CLINIC | Age: 72
End: 2022-08-03

## 2022-08-03 NOTE — TELEPHONE ENCOUNTER
RTC to patient to advise appt scheduled for 8/5/22 at 10:20 AM with  [facility] and needs confirmation. No answer and LVM.

## 2022-08-03 NOTE — TELEPHONE ENCOUNTER
----- Message from Cherry Reina sent at 8/2/2022  1:20 PM EDT -----  Subject: Appointment Request    Reason for Call: Established Patient Appointment needed: Routine Existing   Condition Follow Up    QUESTIONS    Reason for appointment request? Available appointments did not meet   patient need     Additional Information for Provider? sinus infection, screened green, the   saline solution did not work  ---------------------------------------------------------------------------  --------------  Bernadette DA SILVA  [phone]; OK to leave message on voicemail  ---------------------------------------------------------------------------  --------------  SCRIPT ANSWERS  COVID Screen: Ayla Cross

## 2022-08-05 ENCOUNTER — OFFICE VISIT (OUTPATIENT)
Dept: INTERNAL MEDICINE CLINIC | Age: 72
End: 2022-08-05
Payer: MEDICARE

## 2022-08-05 VITALS
SYSTOLIC BLOOD PRESSURE: 134 MMHG | TEMPERATURE: 98.2 F | WEIGHT: 315 LBS | OXYGEN SATURATION: 96 % | BODY MASS INDEX: 36.45 KG/M2 | DIASTOLIC BLOOD PRESSURE: 80 MMHG | RESPIRATION RATE: 16 BRPM | HEIGHT: 78 IN | HEART RATE: 69 BPM

## 2022-08-05 DIAGNOSIS — J01.10 ACUTE NON-RECURRENT FRONTAL SINUSITIS: Primary | ICD-10-CM

## 2022-08-05 DIAGNOSIS — M54.32 LEFT SIDED SCIATICA: ICD-10-CM

## 2022-08-05 PROCEDURE — G8536 NO DOC ELDER MAL SCRN: HCPCS | Performed by: INTERNAL MEDICINE

## 2022-08-05 PROCEDURE — G0463 HOSPITAL OUTPT CLINIC VISIT: HCPCS | Performed by: INTERNAL MEDICINE

## 2022-08-05 PROCEDURE — G8427 DOCREV CUR MEDS BY ELIG CLIN: HCPCS | Performed by: INTERNAL MEDICINE

## 2022-08-05 PROCEDURE — 1101F PT FALLS ASSESS-DOCD LE1/YR: CPT | Performed by: INTERNAL MEDICINE

## 2022-08-05 PROCEDURE — G8752 SYS BP LESS 140: HCPCS | Performed by: INTERNAL MEDICINE

## 2022-08-05 PROCEDURE — G8510 SCR DEP NEG, NO PLAN REQD: HCPCS | Performed by: INTERNAL MEDICINE

## 2022-08-05 PROCEDURE — G8754 DIAS BP LESS 90: HCPCS | Performed by: INTERNAL MEDICINE

## 2022-08-05 PROCEDURE — 99213 OFFICE O/P EST LOW 20 MIN: CPT | Performed by: INTERNAL MEDICINE

## 2022-08-05 PROCEDURE — G8417 CALC BMI ABV UP PARAM F/U: HCPCS | Performed by: INTERNAL MEDICINE

## 2022-08-05 PROCEDURE — 3017F COLORECTAL CA SCREEN DOC REV: CPT | Performed by: INTERNAL MEDICINE

## 2022-08-05 RX ORDER — METHYLPREDNISOLONE 4 MG/1
TABLET ORAL
Qty: 1 DOSE PACK | Refills: 0 | Status: SHIPPED | OUTPATIENT
Start: 2022-08-05 | End: 2022-08-15

## 2022-08-05 RX ORDER — DOXYCYCLINE 100 MG/1
100 CAPSULE ORAL 2 TIMES DAILY
Qty: 14 CAPSULE | Refills: 0 | Status: SHIPPED | OUTPATIENT
Start: 2022-08-05 | End: 2022-10-17 | Stop reason: ALTCHOICE

## 2022-08-05 RX ORDER — FLUTICASONE PROPIONATE 50 MCG
2 SPRAY, SUSPENSION (ML) NASAL DAILY
Qty: 1 EACH | Refills: 1 | Status: SHIPPED | OUTPATIENT
Start: 2022-08-05 | End: 2022-08-28

## 2022-08-05 NOTE — PROGRESS NOTES
HISTORY OF PRESENT ILLNESS    Chief Complaint   Patient presents with    Sinus Infection     Negative COVID test - 2 wks - right side of face pressure - watery eyes - nasal drainage in back of throat - sneezing - using nasal saline and afrin. Back Pain     Sciatica - 3 wks. Presents with a 2-week history of right-sided facial pressure, severe congestion of his right nostril greater than left. Watery eyes, postnasal drip, sneezing, mild cough. Denies any fever, chills. No sick exposures. Has tested negative for COVID. No shortness of breath. Past history of intermittent sinusitis. Has tried Afrin without improvement. Also mentions left sciatica symptoms. Pain from his left buttock radiating to his left leg. Symptoms are intermittent and relatively severe at times. He does have ankylosing spondylitis and history of DJD including lumbar laminectomy this year. Review of Systems   All other systems reviewed and are negative, except as noted in HPI    Past Medical and Surgical History   has a past medical history of Abnormal stress test (12/2021), Ankylosing spondylitis ([address] (1977), Benign essential HTN, BPH (benign prostatic hyperplasia), Chronic edema, Colon polyp, COVID-19 vaccine series completed, DJD (degenerative joint disease) of cervical spine, KING (dyspnea on exertion), History of basal cell cancer (2013), History of bilateral knee replacement, History of DVT (deep vein thrombosis), History of vascular access device (03/25/2022), Insomnia, Kidney stone, Lumbar spinal stenosis (2011), Neuropathy, JUDY on CPAP, Psoriasis, Rheumatoid arthritis of cervical spine ([address] and SOB (shortness of breath) (2014).    has a past surgical history that includes hx shoulder arthroscopy (Left); hx wrist fracture tx (Right); hx lithotripsy; hx knee replacement (Bilateral); hx mohs procedure (2013?); hx colonoscopy (10/15/2019); hx colonoscopy; hx heart catheterization (2014); hx heart catheterization (01/03/2022); hx lumbar laminectomy (2022); and hx back surgery (2022). reports that he quit smoking about 36 years ago. His smoking use included cigarettes. He has never used smokeless tobacco. He reports current alcohol use. He reports that he does not use drugs. family history includes Heart Disease in his father; OSTEOARTHRITIS in his father and sister. Physical Exam   Nursing note and vitals reviewed. Blood pressure 134/80, pulse 69, temperature 98.2 °F (36.8 °C), temperature source Oral, resp. rate 16, height 6' 8\" (2.032 m), weight (!) 398 lb 12.8 oz (180.9 kg), SpO2 96 %. Constitutional:  No distress. Eyes: Conjunctivae are normal.   Severe narrowing of the nasal passages, right greater than left. Moderate erythema of mucosa. Ears:  Hearing grossly intact  Cardiovascular: Normal rate. regular rhythm, no murmurs or gallops  No edema  Pulmonary/Chest: Effort normal.   CTAB  Musculoskeletal: moves all 4 extremities   Neurological: Alert and oriented to person, place, and time. Skin: No visible rash noted. Psychiatric: Normal mood and affect. Behavior is normal.     Assessment and Plan    Diagnoses and all orders for this visit:    1. Acute non-recurrent frontal sinusitis  Significant nasal congestion. Failure to improve after 10 days. Recommend trial of steroids, nasal corticosteroid spray and antibiotics. -     methylPREDNISolone (MEDROL DOSEPACK) 4 mg tablet; USE AS DIRECTED ON PACKAGE  -     doxycycline (MONODOX) 100 mg capsule; Take 1 Capsule by mouth two (2) times a day. -     fluticasone propionate (FLONASE) 50 mcg/actuation nasal spray; Take 2 Sprays by Both Nostrils route in the morning. Indications: inflammation of the nose due to an allergy    2. Left sided sciatica  Intermittent symptoms. Has significant DJD as well. We will give Medrol for sinuses as above and hopefully that will also help this issue. Consider physical therapy and further work-up if persists.   - methylPREDNISolone (MEDROL DOSEPACK) 4 mg tablet; USE AS DIRECTED ON PACKAGE      lab results and schedule of future lab studies reviewed with patient  reviewed medications and side effects in detail    Return to clinic for further evaluation if new symptoms develop      Current Outpatient Medications   Medication Sig    sodium chloride (AFRIN SALINE NASAL MIST NA) by Nasal route as needed. methylPREDNISolone (MEDROL DOSEPACK) 4 mg tablet USE AS DIRECTED ON PACKAGE    doxycycline (MONODOX) 100 mg capsule Take 1 Capsule by mouth two (2) times a day. fluticasone propionate (FLONASE) 50 mcg/actuation nasal spray Take 2 Sprays by Both Nostrils route in the morning. Indications: inflammation of the nose due to an allergy    diclofenac EC (VOLTAREN) 75 mg EC tablet Take 1 Tablet by mouth two (2) times a day. cyclobenzaprine (FLEXERIL) 10 mg tablet Take 1 Tablet by mouth three (3) times daily as needed for Muscle Spasm(s). senna-docusate (PERICOLACE) 8.6-50 mg per tablet Take 1 Tablet by mouth two (2) times a day. cyanocobalamin, vitamin B-12, (VITAMIN B-12 PO) Take 2,500 mcg by mouth daily. cetirizine (ZYRTEC) 10 mg tablet Take 10 mg by mouth as needed for Allergies. tamsulosin (FLOMAX) 0.4 mg capsule Take 0.4 mg by mouth daily. folic acid (FOLVITE) 1 mg tablet TAKE 1 TABLET BY MOUTH EVERY DAY    augmented betamethasone dipropionate (DIPROLENE-AF) 0.05 % ointment Apply  to affected area as needed for Skin Irritation. No current facility-administered medications for this visit.

## 2022-08-12 DIAGNOSIS — I10 BENIGN ESSENTIAL HTN: ICD-10-CM

## 2022-08-12 RX ORDER — VALSARTAN AND HYDROCHLOROTHIAZIDE 160; 12.5 MG/1; MG/1
TABLET, FILM COATED ORAL
Qty: 90 TABLET | Refills: 3 | Status: SHIPPED | OUTPATIENT
Start: 2022-08-12

## 2022-08-27 DIAGNOSIS — J01.10 ACUTE NON-RECURRENT FRONTAL SINUSITIS: ICD-10-CM

## 2022-08-28 RX ORDER — FLUTICASONE PROPIONATE 50 MCG
2 SPRAY, SUSPENSION (ML) NASAL DAILY
Qty: 1 EACH | Refills: 5 | Status: SHIPPED | OUTPATIENT
Start: 2022-08-28

## 2022-09-11 ENCOUNTER — TRANSCRIBE ORDER (OUTPATIENT)
Dept: SCHEDULING | Age: 72
End: 2022-09-11

## 2022-09-11 DIAGNOSIS — M48.062 SPINAL STENOSIS, LUMBAR REGION, WITH NEUROGENIC CLAUDICATION: Primary | ICD-10-CM

## 2022-09-11 DIAGNOSIS — Z98.890 HISTORY OF LUMBAR SURGERY: ICD-10-CM

## 2022-09-20 ENCOUNTER — HOSPITAL ENCOUNTER (OUTPATIENT)
Dept: MRI IMAGING | Age: 72
Discharge: HOME | End: 2022-09-20
Payer: MEDICARE

## 2022-09-20 DIAGNOSIS — M48.062 SPINAL STENOSIS, LUMBAR REGION, WITH NEUROGENIC CLAUDICATION: ICD-10-CM

## 2022-09-20 DIAGNOSIS — Z98.890 HISTORY OF LUMBAR SURGERY: ICD-10-CM

## 2022-09-20 PROCEDURE — 72148 MRI LUMBAR SPINE W/O DYE: CPT

## 2022-10-17 ENCOUNTER — OFFICE VISIT (OUTPATIENT)
Dept: INTERNAL MEDICINE CLINIC | Age: 72
End: 2022-10-17
Payer: MEDICARE

## 2022-10-17 ENCOUNTER — TELEPHONE (OUTPATIENT)
Dept: INTERNAL MEDICINE CLINIC | Age: 72
End: 2022-10-17

## 2022-10-17 VITALS
WEIGHT: 315 LBS | BODY MASS INDEX: 36.45 KG/M2 | HEART RATE: 81 BPM | SYSTOLIC BLOOD PRESSURE: 125 MMHG | TEMPERATURE: 98.2 F | OXYGEN SATURATION: 94 % | HEIGHT: 78 IN | RESPIRATION RATE: 15 BRPM | DIASTOLIC BLOOD PRESSURE: 72 MMHG

## 2022-10-17 DIAGNOSIS — R42 VERTIGO: Primary | ICD-10-CM

## 2022-10-17 PROCEDURE — 99213 OFFICE O/P EST LOW 20 MIN: CPT | Performed by: INTERNAL MEDICINE

## 2022-10-17 PROCEDURE — 3017F COLORECTAL CA SCREEN DOC REV: CPT | Performed by: INTERNAL MEDICINE

## 2022-10-17 PROCEDURE — G0463 HOSPITAL OUTPT CLINIC VISIT: HCPCS | Performed by: INTERNAL MEDICINE

## 2022-10-17 PROCEDURE — G8427 DOCREV CUR MEDS BY ELIG CLIN: HCPCS | Performed by: INTERNAL MEDICINE

## 2022-10-17 PROCEDURE — G8754 DIAS BP LESS 90: HCPCS | Performed by: INTERNAL MEDICINE

## 2022-10-17 PROCEDURE — 1101F PT FALLS ASSESS-DOCD LE1/YR: CPT | Performed by: INTERNAL MEDICINE

## 2022-10-17 PROCEDURE — G8752 SYS BP LESS 140: HCPCS | Performed by: INTERNAL MEDICINE

## 2022-10-17 PROCEDURE — G8510 SCR DEP NEG, NO PLAN REQD: HCPCS | Performed by: INTERNAL MEDICINE

## 2022-10-17 PROCEDURE — G8417 CALC BMI ABV UP PARAM F/U: HCPCS | Performed by: INTERNAL MEDICINE

## 2022-10-17 PROCEDURE — G8536 NO DOC ELDER MAL SCRN: HCPCS | Performed by: INTERNAL MEDICINE

## 2022-10-17 RX ORDER — MECLIZINE HYDROCHLORIDE 25 MG/1
25 TABLET ORAL
Qty: 30 TABLET | Refills: 0 | Status: SHIPPED | OUTPATIENT
Start: 2022-10-17 | End: 2022-10-27

## 2022-10-17 NOTE — TELEPHONE ENCOUNTER
Spoke with patient and he reports that he is  having dizziness and light headiness (Vertigo) at night in bed while turning and occasionally when he bends over for last 2-3 weeks. He has tried the exercises but he says it is not helping. He reports a HX of Vertigo. He is about to go out of town and is concerned he would like to take care of before travel. Scheduled for today at 2:00 PM. Grateful for the call.

## 2022-10-18 NOTE — PROGRESS NOTES
HISTORY OF PRESENT ILLNESS    Chief Complaint   Patient presents with    Dizziness     Vertigo - 3 wks - worried about falling - tried exercises online - discussed meds and therapy. Presents with vertigo for the past 3 weeks. Occurs mostly when he is in bed and he rolls over. Resolves after a few seconds or a minute. Denies any associated nausea and vomiting. He is mostly concerned about the fact that he sometimes will lean over and feels that he could fall over. He ambulates chronically with a cane. No recent falls. He does have lumbar stenosis and neuropathy. Past history of vertigo which resolved after physical therapy. Asks if he could try any medications. Review of Systems   All other systems reviewed and are negative, except as noted in HPI    Past Medical and Surgical History   has a past medical history of Abnormal stress test (12/2021), Ankylosing spondylitis ([facility] [address] (1977), Benign essential HTN, BPH (benign prostatic hyperplasia), Chronic edema, Colon polyp, COVID-19 vaccine series completed, DJD (degenerative joint disease) of cervical spine, KING (dyspnea on exertion), History of basal cell cancer (2013), History of bilateral knee replacement, History of DVT (deep vein thrombosis), History of vascular access device (03/25/2022), Insomnia, Kidney stone, Lumbar spinal stenosis (2011), Neuropathy, JUDY on CPAP, Psoriasis, Rheumatoid arthritis of cervical spine ([facility] [address] and SOB (shortness of breath) (2014). has a past surgical history that includes hx shoulder arthroscopy (Left); hx wrist fracture tx (Right); hx lithotripsy; hx knee replacement (Bilateral); hx mohs procedure (2013?); hx colonoscopy (10/15/2019); hx colonoscopy; hx heart catheterization (2014); hx heart catheterization (01/03/2022); hx lumbar laminectomy (2022); and hx back surgery (2022). reports that he quit smoking about 36 years ago. His smoking use included cigarettes.  He has never used smokeless tobacco. He reports current alcohol use. He reports that he does not use drugs. family history includes Heart Disease in his father; OSTEOARTHRITIS in his father and sister. Physical Exam   Nursing note and vitals reviewed. Blood pressure 125/72, pulse 81, temperature 98.2 °F (36.8 °C), temperature source Oral, resp. rate 15, height 6' 8\" (2.032 m), weight (!) 407 lb (184.6 kg), SpO2 94 %. Constitutional: In no distress. Eyes: Conjunctivae are normal.  HEENT:  No LAD or thyromegaly   Bilateral tympanic membranes normal.  Cardiovascular: Normal rate. regular rhythm. No murmurs  No edema  Pulmonary/Chest: Effort normal. clear to ausculation blaterally  Musculoskeletal:  no edema. Abd:  Neurological: Alert and oriented. Grossly intact cranial nerves and motor function. Skin: No visible rash noted. Psychiatric: Normal mood and affect. Behavior is normal.     ASSESSMENT and PLAN  1. Vertigo  Vertigo for couple weeks. Relatively mild, but problematic. Recommend trial of meclizine, especially at night. He cannot do full stretching because of history of cervical spine disease. Will refer to physical therapy if needed. He may have difficulty tolerating physical therapy because of cervical spine disease. Consider trial of oral steroid to help with any eustachian tube dysfunction. lab results and schedule of future lab studies reviewed with patient  reviewed medications and side effects in detail    Return to clinic for further evaluation if new symptoms develop or if current symptoms worsen or fail to resolve. There are no Patient Instructions on file for this visit.

## 2022-11-17 ENCOUNTER — TELEPHONE (OUTPATIENT)
Dept: INTERNAL MEDICINE CLINIC | Age: 72
End: 2022-11-17

## 2022-11-17 NOTE — TELEPHONE ENCOUNTER
Spoke with patient and her reports that he is having severe stomach pains since yesterdays evening. He denies any diarrhea. He reports his last BM 11/16/22 at 9 PM. Stool is soft. Abdominal pain is moving from right to left abdomen. Appetite is poor at this time. No nausea or vomiting. Denies fever. Denies any other symptoms. Reports the pain is intermittent but made it difficult to sleep last night. He reports that he took [facility] and Tums but it did not help. He denies eating anything exotic recently such as oysters. Last meal was a hotdog. No available appts at thsi time. Scheduled with OMAIRA Wang for 11/18/22 at 1120 AM. Advised to go to Urgent care if symptoms should worsen or fail to improve. He states understanding and grateful for the call.

## 2022-12-12 ENCOUNTER — TELEPHONE (OUTPATIENT)
Dept: INTERNAL MEDICINE CLINIC | Age: 72
End: 2022-12-12

## 2022-12-12 DIAGNOSIS — J01.10 ACUTE NON-RECURRENT FRONTAL SINUSITIS: ICD-10-CM

## 2022-12-12 DIAGNOSIS — M54.32 LEFT SIDED SCIATICA: ICD-10-CM

## 2022-12-12 RX ORDER — METHYLPREDNISOLONE 4 MG/1
TABLET ORAL
Qty: 1 DOSE PACK | Refills: 0 | Status: SHIPPED | OUTPATIENT
Start: 2022-12-12 | End: 2022-12-22

## 2022-12-12 RX ORDER — FLUTICASONE PROPIONATE 50 MCG
2 SPRAY, SUSPENSION (ML) NASAL DAILY
Qty: 1 EACH | Refills: 1 | Status: SHIPPED | OUTPATIENT
Start: 2022-12-12

## 2022-12-12 RX ORDER — DOXYCYCLINE 100 MG/1
100 CAPSULE ORAL 2 TIMES DAILY
Qty: 14 CAPSULE | Refills: 0 | Status: SHIPPED | OUTPATIENT
Start: 2022-12-12

## 2022-12-12 NOTE — TELEPHONE ENCOUNTER
Spoke with patient and he reports that he has had a sinus infection for last week. He reports having the same symptoms back in August. He is taking sudafed and saline solution-but it is not improving. He states its not working and is requesting the same medications he had before for it Medrol Dose pack, Flonase and Doxycycline. He reports that really knocked it out. He asks if Dr. Galina Bishop can prescribe the same thing. Dr. Galina Bishop notified.

## 2022-12-12 NOTE — TELEPHONE ENCOUNTER
Spoke with patient and updated on DrCyndy Rice sent scripts to his pharmacy for Medrol, doxycycline, Flonase for Sinus Infection. He states understanding and grateful for the call.

## 2022-12-12 NOTE — TELEPHONE ENCOUNTER
Patient would like to know if the doctor can send in medication for a sinus infection. Patient states the medicine the doctor prescribed for them last time worked great.

## 2023-01-06 ENCOUNTER — TELEPHONE (OUTPATIENT)
Dept: INTERNAL MEDICINE CLINIC | Age: 73
End: 2023-01-06

## 2023-01-06 NOTE — TELEPHONE ENCOUNTER
Patient called via the [facility] to state he has had a chest cold for weeks and now has tested positive for Covid. 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 Please call back and advise. Patient states he does not have a fever, just coughing and hoarse.

## 2023-01-06 NOTE — TELEPHONE ENCOUNTER
Spoke with patient and he reports he has had a chest cold for weeks and now has tested positive for Covid this AM. His first symptoms started a week ago. Current symptoms are cough, no congestion, no headaches, no loss of taste or smell. He is vaccinated and boosted x 1 for COVID. No Flu vaccine this year. OTC Mucinex and Nyquil at night and with good effect. He reports his symptoms are much better than what they were and he continues to feel better each day. Advised he is out of the window for Paxlovid at this time and since he is improving it may not be necessary. He states understanding and grateful for the call. Dr. Seth Owens notified.

## 2023-01-09 NOTE — TELEPHONE ENCOUNTER
Spoke with patient and updated on Dr. Christian Minaya comments and recommendations. Patient reports that he is improving. Grateful for the call.

## 2023-01-20 NOTE — PROGRESS NOTES
Problem: Mobility Impaired (Adult and Pediatric)  Goal: *Acute Goals and Plan of Care (Insert Text)  Description: FUNCTIONAL STATUS PRIOR TO ADMISSION: Patient was modified independent using a walking stick for functional mobility. HOME SUPPORT PRIOR TO ADMISSION: The patient lived alone with no local support. Pt does not give full answers for his home support. 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 Physical Therapy Goals  Initiated 2/16/2022    1. Patient will move from supine to sit and sit to supine  in bed with independence within 4 days. 2. Patient will perform sit to stand with modified independence within 4 days. 3. Patient will ambulate with modified independence for 200 feet with the least restrictive device within 4 days. 4. Patient will ascend/descend 3 stairs with 1 handrail(s) with minimal assistance/contact guard assist within 4 days. 5. Patient will verbalize and demonstrate understanding of spinal precautions (No bending, lifting greater than 5 lbs, or twisting; log-roll technique; frequent repositioning as instructed) within 4 days. 2/17/2022 1606 by Jesus Armendariz, PT  Outcome: Progressing Towards Goal  2/17/2022 1117 by Jesus Armendariz, PT  Outcome: Progressing Towards Goal   PHYSICAL THERAPY TREATMENT  Patient: Toyna Mchugh (70 y.o. male)  Date: 2/17/2022  Diagnosis: Spinal stenosis, lumbar region, with neurogenic claudication [M48.062]  Lumbar stenosis with neurogenic claudication [M48.062] <principal problem not specified>  Procedure(s) (LRB):  L2-L5 LAMINECTOMY (N/A) 2 Days Post-Op  Precautions: Fall,Spinal,Other (comment) (brace) No bending, no lifting greater than 5 lbs, no twisting, log-roll technique, repositioning every 20-30 min except when sleeping, brace when OOB (if ordered)  Chart, physical therapy assessment, plan of care and goals were reviewed.     ASSESSMENT  Patient continues with skilled PT services and is progressing towards goals. Patient this afternoon with good tolerance and participation in physical therapy session. Patient ambulates 250ft in hallways with SBA and RW. He ascends/descends 4 stairs with single handrail to mimic the home environment. There is no instability or LOB on the stairs. Patient without complaints of lightheadedness/dizziness during treatment. At this time he is clear for d/c when medically indicated from PT perspective. Current Level of Function Impacting Discharge (mobility/balance): supervision/SBA    Other factors to consider for discharge: BP changes during previous session         PLAN :  Patient continues to benefit from skilled intervention to address the above impairments. Continue treatment per established plan of care. to address goals. Recommendation for discharge: (in order for the patient to meet his/her long term goals)  Physical therapy at least 2 days/week in the home     This discharge recommendation:  Has been made in collaboration with the attending provider and/or case management    IF patient discharges home will need the following DME: bariatric RW       SUBJECTIVE:   Patient stated Nida Phillip let's do it.  re: stair training     OBJECTIVE DATA SUMMARY:   Patient received seated in bedside chair, agreeable to participate in PT session. Patient was cleared by nursing to participate in PT session. Critical Behavior:  Neurologic State: Alert  Orientation Level: Oriented X4  Cognition: Follows commands  Safety/Judgement: Awareness of environment,Fall prevention,Home safety,Insight into deficits    Spinal diagnosis intervention:  The patient stated 3/3 back precautions when prompted. Reviewed all 3 back precautions, log roll technique, and sitting for 30 minutes at a time. The patient required no verbal cues to maintain back precautions during functional activity.      Functional Mobility Training:    Bed Mobility:  Log Rolling: Supervision; Additional time  Supine to Sit: Additional time;Stand-by assistance  Sit to Supine: Supervision; Additional time  Scooting: Supervision; Additional time        Transfers:  Sit to Stand: Stand-by assistance  Stand to Sit: Stand-by assistance        Bed to Chair: Stand-by assistance                    Balance:  Sitting: Intact; Without support  Standing: Intact; With support  Standing - Static: Good  Standing - Dynamic : Fair  Ambulation/Gait Training:  Distance (ft): 250 Feet (ft)  Assistive Device: Gait belt;Brace/Splint; Walker, rolling (bariatric RW)  Ambulation - Level of Assistance: Stand-by assistance        Gait Abnormalities: Decreased step clearance              Speed/Ginny: Pace decreased (<100 feet/min)  Step Length: Left shortened;Right shortened                   Pain Rating:  Patient with minimal complaints of pain during therapy. He again notes frontal headache but states \" I know what this is, it's from my neck. This is what it's normally like\"    Activity Tolerance:   Good and tolerates ADLs without rest breaks    After treatment patient left in no apparent distress:   Supine in bed, Call bell within reach, and Side rails x 3    COMMUNICATION/COLLABORATION:   The patients plan of care was discussed with: Occupational therapy assistant, Registered nurse, Case management, and ortho NP .    Greg Sánchez PT, DPT   Time Calculation: 26 mins I will STOP taking the medications listed below when I get home from the hospital:  None

## 2023-05-17 ENCOUNTER — OFFICE VISIT (OUTPATIENT)
Age: 73
End: 2023-05-17
Payer: MEDICARE

## 2023-05-17 VITALS
BODY MASS INDEX: 36.45 KG/M2 | RESPIRATION RATE: 17 BRPM | TEMPERATURE: 98.1 F | HEART RATE: 76 BPM | DIASTOLIC BLOOD PRESSURE: 82 MMHG | SYSTOLIC BLOOD PRESSURE: 135 MMHG | HEIGHT: 78 IN | OXYGEN SATURATION: 93 % | WEIGHT: 315 LBS

## 2023-05-17 DIAGNOSIS — F51.01 PRIMARY INSOMNIA: ICD-10-CM

## 2023-05-17 DIAGNOSIS — I10 BENIGN ESSENTIAL HTN: Primary | ICD-10-CM

## 2023-05-17 DIAGNOSIS — M45.0 ANKYLOSING SPONDYLITIS OF MULTIPLE SITES IN SPINE (HCC): ICD-10-CM

## 2023-05-17 DIAGNOSIS — M05.9 RHEUMATOID ARTHRITIS WITH POSITIVE RHEUMATOID FACTOR, INVOLVING UNSPECIFIED SITE (HCC): ICD-10-CM

## 2023-05-17 DIAGNOSIS — L40.9 PSORIASIS: ICD-10-CM

## 2023-05-17 DIAGNOSIS — E66.01 OBESITY, CLASS III, BMI 40-49.9 (MORBID OBESITY) (HCC): ICD-10-CM

## 2023-05-17 PROBLEM — M48.061 SPINAL STENOSIS OF LUMBAR REGION: Status: ACTIVE | Noted: 2022-02-15

## 2023-05-17 PROBLEM — R07.9 ACUTE CHEST PAIN: Status: ACTIVE | Noted: 2022-04-28

## 2023-05-17 PROBLEM — N21.0 URINARY BLADDER STONE: Status: ACTIVE | Noted: 2022-04-28

## 2023-05-17 PROBLEM — Z96.651 STATUS POST TOTAL RIGHT KNEE REPLACEMENT: Status: ACTIVE | Noted: 2017-08-23

## 2023-05-17 PROBLEM — R60.0 EDEMA OF LOWER EXTREMITY: Status: ACTIVE | Noted: 2018-06-25

## 2023-05-17 PROBLEM — M06.9 RHEUMATOID ARTHRITIS (HCC): Status: ACTIVE | Noted: 2022-04-28

## 2023-05-17 PROCEDURE — G8417 CALC BMI ABV UP PARAM F/U: HCPCS | Performed by: INTERNAL MEDICINE

## 2023-05-17 PROCEDURE — 1036F TOBACCO NON-USER: CPT | Performed by: INTERNAL MEDICINE

## 2023-05-17 PROCEDURE — 3017F COLORECTAL CA SCREEN DOC REV: CPT | Performed by: INTERNAL MEDICINE

## 2023-05-17 PROCEDURE — 99214 OFFICE O/P EST MOD 30 MIN: CPT | Performed by: INTERNAL MEDICINE

## 2023-05-17 PROCEDURE — 3075F SYST BP GE 130 - 139MM HG: CPT | Performed by: INTERNAL MEDICINE

## 2023-05-17 PROCEDURE — 3079F DIAST BP 80-89 MM HG: CPT | Performed by: INTERNAL MEDICINE

## 2023-05-17 PROCEDURE — 1123F ACP DISCUSS/DSCN MKR DOCD: CPT | Performed by: INTERNAL MEDICINE

## 2023-05-17 PROCEDURE — G8427 DOCREV CUR MEDS BY ELIG CLIN: HCPCS | Performed by: INTERNAL MEDICINE

## 2023-05-17 RX ORDER — CLOTRIMAZOLE 1 %
CREAM (GRAM) TOPICAL 2 TIMES DAILY
COMMUNITY

## 2023-05-17 RX ORDER — LANOLIN ALCOHOL/MO/W.PET/CERES
1000 CREAM (GRAM) TOPICAL DAILY
COMMUNITY

## 2023-05-17 RX ORDER — ZOLPIDEM TARTRATE 10 MG/1
10 TABLET ORAL NIGHTLY PRN
Qty: 1 TABLET | Refills: 4
Start: 2023-05-17 | End: 2023-08-15

## 2023-05-17 RX ORDER — VALSARTAN AND HYDROCHLOROTHIAZIDE 160; 12.5 MG/1; MG/1
1 TABLET, FILM COATED ORAL DAILY
Qty: 90 TABLET | Refills: 2 | Status: SHIPPED | OUTPATIENT
Start: 2023-05-17

## 2023-05-17 RX ORDER — CLOTRIMAZOLE 10 MG/1
LOZENGE ORAL; TOPICAL
COMMUNITY
End: 2023-05-17 | Stop reason: ALTCHOICE

## 2023-05-17 RX ORDER — INFLIXIMAB 100 MG/10ML
5 INJECTION, POWDER, LYOPHILIZED, FOR SOLUTION INTRAVENOUS SEE ADMIN INSTRUCTIONS
Qty: 1 EACH | Refills: 5 | COMMUNITY
Start: 2023-05-17

## 2023-05-17 RX ORDER — METHOTREXATE 25 MG/ML
25 INJECTION, SOLUTION INTRA-ARTERIAL; INTRAMUSCULAR; INTRAVENOUS WEEKLY
Qty: 12 ML | Refills: 5
Start: 2023-05-17

## 2023-05-17 RX ORDER — CLOBETASOL PROPIONATE 0.5 MG/G
CREAM TOPICAL
COMMUNITY
Start: 2023-05-04

## 2023-05-17 SDOH — HEALTH STABILITY: MENTAL HEALTH: HOW OFTEN DO YOU HAVE A DRINK CONTAINING ALCOHOL?: MONTHLY OR LESS

## 2023-05-17 SDOH — HEALTH STABILITY: PHYSICAL HEALTH: ON AVERAGE, HOW MANY MINUTES DO YOU ENGAGE IN EXERCISE AT THIS LEVEL?: 0 MIN

## 2023-05-17 SDOH — SOCIAL STABILITY: SOCIAL INSECURITY
WITHIN THE LAST YEAR, HAVE YOU BEEN KICKED, HIT, SLAPPED, OR OTHERWISE PHYSICALLY HURT BY YOUR PARTNER OR EX-PARTNER?: NO

## 2023-05-17 SDOH — HEALTH STABILITY: MENTAL HEALTH
STRESS IS WHEN SOMEONE FEELS TENSE, NERVOUS, ANXIOUS, OR CAN'T SLEEP AT NIGHT BECAUSE THEIR MIND IS TROUBLED. HOW STRESSED ARE YOU?: NOT AT ALL

## 2023-05-17 SDOH — ECONOMIC STABILITY: FOOD INSECURITY: WITHIN THE PAST 12 MONTHS, YOU WORRIED THAT YOUR FOOD WOULD RUN OUT BEFORE YOU GOT MONEY TO BUY MORE.: NEVER TRUE

## 2023-05-17 SDOH — ECONOMIC STABILITY: FOOD INSECURITY: WITHIN THE PAST 12 MONTHS, THE FOOD YOU BOUGHT JUST DIDN'T LAST AND YOU DIDN'T HAVE MONEY TO GET MORE.: NEVER TRUE

## 2023-05-17 SDOH — SOCIAL STABILITY: SOCIAL INSECURITY
WITHIN THE LAST YEAR, HAVE TO BEEN RAPED OR FORCED TO HAVE ANY KIND OF SEXUAL ACTIVITY BY YOUR PARTNER OR EX-PARTNER?: NO

## 2023-05-17 SDOH — ECONOMIC STABILITY: TRANSPORTATION INSECURITY
IN THE PAST 12 MONTHS, HAS LACK OF TRANSPORTATION KEPT YOU FROM MEETINGS, WORK, OR FROM GETTING THINGS NEEDED FOR DAILY LIVING?: NO

## 2023-05-17 SDOH — SOCIAL STABILITY: SOCIAL NETWORK
IN A TYPICAL WEEK, HOW MANY TIMES DO YOU TALK ON THE PHONE WITH FAMILY, FRIENDS, OR NEIGHBORS?: MORE THAN THREE TIMES A WEEK

## 2023-05-17 SDOH — HEALTH STABILITY: PHYSICAL HEALTH: ON AVERAGE, HOW MANY DAYS PER WEEK DO YOU ENGAGE IN MODERATE TO STRENUOUS EXERCISE (LIKE A BRISK WALK)?: 0 DAYS

## 2023-05-17 SDOH — SOCIAL STABILITY: SOCIAL NETWORK: ARE YOU MARRIED, WIDOWED, DIVORCED, SEPARATED, NEVER MARRIED, OR LIVING WITH A PARTNER?: NEVER MARRIED

## 2023-05-17 SDOH — ECONOMIC STABILITY: HOUSING INSECURITY
IN THE LAST 12 MONTHS, WAS THERE A TIME WHEN YOU DID NOT HAVE A STEADY PLACE TO SLEEP OR SLEPT IN A SHELTER (INCLUDING NOW)?: NO

## 2023-05-17 SDOH — SOCIAL STABILITY: SOCIAL INSECURITY: WITHIN THE LAST YEAR, HAVE YOU BEEN AFRAID OF YOUR PARTNER OR EX-PARTNER?: NO

## 2023-05-17 SDOH — ECONOMIC STABILITY: HOUSING INSECURITY: IN THE LAST 12 MONTHS, HOW MANY PLACES HAVE YOU LIVED?: 1

## 2023-05-17 SDOH — SOCIAL STABILITY: SOCIAL NETWORK: HOW OFTEN DO YOU GET TOGETHER WITH FRIENDS OR RELATIVES?: MORE THAN THREE TIMES A WEEK

## 2023-05-17 SDOH — ECONOMIC STABILITY: TRANSPORTATION INSECURITY
IN THE PAST 12 MONTHS, HAS THE LACK OF TRANSPORTATION KEPT YOU FROM MEDICAL APPOINTMENTS OR FROM GETTING MEDICATIONS?: NO

## 2023-05-17 SDOH — SOCIAL STABILITY: SOCIAL INSECURITY: WITHIN THE LAST YEAR, HAVE YOU BEEN HUMILIATED OR EMOTIONALLY ABUSED IN OTHER WAYS BY YOUR PARTNER OR EX-PARTNER?: NO

## 2023-05-17 SDOH — SOCIAL STABILITY: SOCIAL NETWORK: HOW OFTEN DO YOU ATTENT MEETINGS OF THE CLUB OR ORGANIZATION YOU BELONG TO?: NEVER

## 2023-05-17 SDOH — SOCIAL STABILITY: SOCIAL NETWORK: HOW OFTEN DO YOU ATTEND CHURCH OR RELIGIOUS SERVICES?: NEVER

## 2023-05-17 SDOH — ECONOMIC STABILITY: INCOME INSECURITY: IN THE LAST 12 MONTHS, WAS THERE A TIME WHEN YOU WERE NOT ABLE TO PAY THE MORTGAGE OR RENT ON TIME?: NO

## 2023-05-17 SDOH — SOCIAL STABILITY: SOCIAL NETWORK
DO YOU BELONG TO ANY CLUBS OR ORGANIZATIONS SUCH AS CHURCH GROUPS UNIONS, FRATERNAL OR ATHLETIC GROUPS, OR SCHOOL GROUPS?: NO

## 2023-05-17 SDOH — HEALTH STABILITY: MENTAL HEALTH: HOW MANY STANDARD DRINKS CONTAINING ALCOHOL DO YOU HAVE ON A TYPICAL DAY?: 1 OR 2

## 2023-05-17 SDOH — ECONOMIC STABILITY: INCOME INSECURITY: HOW HARD IS IT FOR YOU TO PAY FOR THE VERY BASICS LIKE FOOD, HOUSING, MEDICAL CARE, AND HEATING?: NOT HARD AT ALL

## 2023-05-17 ASSESSMENT — PATIENT HEALTH QUESTIONNAIRE - PHQ9
SUM OF ALL RESPONSES TO PHQ QUESTIONS 1-9: 0
1. LITTLE INTEREST OR PLEASURE IN DOING THINGS: 0
SUM OF ALL RESPONSES TO PHQ9 QUESTIONS 1 & 2: 0
SUM OF ALL RESPONSES TO PHQ QUESTIONS 1-9: 0
2. FEELING DOWN, DEPRESSED OR HOPELESS: 0

## 2023-05-17 NOTE — PROGRESS NOTES
HISTORY OF PRESENT ILLNESS    Chief Complaint   Patient presents with    Numbness     Chest - has gotten better - comes and goes. Lab Collection     Recently had labs with rheumatologist.        Presents for follow-up  Returned from fishing trip. Recent episodes of \"chest numbness\". No chest pain, SOB. Cardiac cath 1/2022 normal.    Currently asymptomatic    Labs done w Dr. Shamir Garcia 1/2023. Not available at time of visit. Will request.    Taking flomax pe Dr. Yola Hassan for stones. Says PSA was done. Using clobetasol cream of LLE patch of \"psoriasis\"     Recent infected toe after callous trimming w Dr. Jaden Lomeli 5/4 - 5/13/23. Hypertension  Hypertension ROS: taking medications as instructed, no medication side effects noted, no TIA's, no chest pain on exertion, no dyspnea on exertion, no swelling of ankles     reports that he quit smoking about 37 years ago. His smoking use included cigarettes. He has a 15.00 pack-year smoking history. He has never used smokeless tobacco.    reports current alcohol use. BP Readings from Last 2 Encounters:   05/17/23 135/82   10/17/22 125/72           Review of Systems   All other systems reviewed and are negative, except as noted in HPI    Past Medical and Surgical History   has a past medical history of Abnormal stress test, Ankylosing spondylitis ([address] Benign essential HTN, BPH (benign prostatic hyperplasia), Chronic edema, Colon polyp, COVID-19 vaccine series completed, DJD (degenerative joint disease) of cervical spine, NELSON (dyspnea on exertion), History of basal cell cancer, History of bilateral knee replacement, History of DVT (deep vein thrombosis), History of vascular access device, Insomnia, Kidney stone, Lumbar spinal stenosis, Neuropathy, VERONICA on CPAP, Psoriasis, Rheumatoid arthritis of cervical spine ([address] and SOB (shortness of breath).      has a past surgical history that includes Shoulder arthroscopy (Left); back surgery (2022); lumbar

## 2023-11-20 ENCOUNTER — OFFICE VISIT (OUTPATIENT)
Age: 73
End: 2023-11-20
Payer: MEDICARE

## 2023-11-20 VITALS
SYSTOLIC BLOOD PRESSURE: 138 MMHG | DIASTOLIC BLOOD PRESSURE: 70 MMHG | TEMPERATURE: 98.2 F | HEIGHT: 78 IN | OXYGEN SATURATION: 93 % | WEIGHT: 315 LBS | BODY MASS INDEX: 36.45 KG/M2 | HEART RATE: 100 BPM | RESPIRATION RATE: 18 BRPM

## 2023-11-20 DIAGNOSIS — M05.9 RHEUMATOID ARTHRITIS WITH POSITIVE RHEUMATOID FACTOR, INVOLVING UNSPECIFIED SITE (HCC): ICD-10-CM

## 2023-11-20 DIAGNOSIS — M45.9 ANKYLOSING SPONDYLITIS, UNSPECIFIED SITE OF SPINE (HCC): ICD-10-CM

## 2023-11-20 DIAGNOSIS — I10 BENIGN ESSENTIAL HTN: ICD-10-CM

## 2023-11-20 DIAGNOSIS — R73.01 IFG (IMPAIRED FASTING GLUCOSE): ICD-10-CM

## 2023-11-20 DIAGNOSIS — Z00.00 MEDICARE ANNUAL WELLNESS VISIT, SUBSEQUENT: Primary | ICD-10-CM

## 2023-11-20 DIAGNOSIS — J01.90 ACUTE NON-RECURRENT SINUSITIS, UNSPECIFIED LOCATION: ICD-10-CM

## 2023-11-20 DIAGNOSIS — Z12.5 SCREENING FOR PROSTATE CANCER: ICD-10-CM

## 2023-11-20 PROBLEM — T81.49XA SURGICAL WOUND INFECTION: Status: RESOLVED | Noted: 2022-03-20 | Resolved: 2023-11-20

## 2023-11-20 PROBLEM — E66.01 OBESITY, MORBID (HCC): Status: RESOLVED | Noted: 2020-08-31 | Resolved: 2023-11-20

## 2023-11-20 PROBLEM — T81.49XA POSTOPERATIVE CELLULITIS OF SURGICAL WOUND: Status: RESOLVED | Noted: 2022-03-20 | Resolved: 2023-11-20

## 2023-11-20 PROBLEM — R07.9 ACUTE CHEST PAIN: Status: RESOLVED | Noted: 2022-04-28 | Resolved: 2023-11-20

## 2023-11-20 PROBLEM — M48.062 LUMBAR STENOSIS WITH NEUROGENIC CLAUDICATION: Status: RESOLVED | Noted: 2022-02-15 | Resolved: 2023-11-20

## 2023-11-20 LAB
LOT EXPIRE DATE: NORMAL
LOT KIT NUMBER: NORMAL
SARS-COV-2, POC: NORMAL
VALID INTERNAL CONTROL: YES
VENDOR AND KIT NAME POC: NORMAL

## 2023-11-20 PROCEDURE — 3078F DIAST BP <80 MM HG: CPT | Performed by: INTERNAL MEDICINE

## 2023-11-20 PROCEDURE — G8427 DOCREV CUR MEDS BY ELIG CLIN: HCPCS | Performed by: INTERNAL MEDICINE

## 2023-11-20 PROCEDURE — 3017F COLORECTAL CA SCREEN DOC REV: CPT | Performed by: INTERNAL MEDICINE

## 2023-11-20 PROCEDURE — 99213 OFFICE O/P EST LOW 20 MIN: CPT | Performed by: INTERNAL MEDICINE

## 2023-11-20 PROCEDURE — PBSHW AMB POC SARS-COV-2: Performed by: INTERNAL MEDICINE

## 2023-11-20 PROCEDURE — 3074F SYST BP LT 130 MM HG: CPT | Performed by: INTERNAL MEDICINE

## 2023-11-20 PROCEDURE — G8417 CALC BMI ABV UP PARAM F/U: HCPCS | Performed by: INTERNAL MEDICINE

## 2023-11-20 PROCEDURE — 1036F TOBACCO NON-USER: CPT | Performed by: INTERNAL MEDICINE

## 2023-11-20 PROCEDURE — G0439 PPPS, SUBSEQ VISIT: HCPCS | Performed by: INTERNAL MEDICINE

## 2023-11-20 PROCEDURE — G8484 FLU IMMUNIZE NO ADMIN: HCPCS | Performed by: INTERNAL MEDICINE

## 2023-11-20 PROCEDURE — 87426 SARSCOV CORONAVIRUS AG IA: CPT | Performed by: INTERNAL MEDICINE

## 2023-11-20 PROCEDURE — 1123F ACP DISCUSS/DSCN MKR DOCD: CPT | Performed by: INTERNAL MEDICINE

## 2023-11-20 RX ORDER — PSEUDOEPHEDRINE HCL 30 MG
30 TABLET ORAL EVERY 4 HOURS PRN
COMMUNITY

## 2023-11-20 RX ORDER — ZOSTER VACCINE RECOMBINANT, ADJUVANTED 50 MCG/0.5
0.5 KIT INTRAMUSCULAR SEE ADMIN INSTRUCTIONS
Qty: 0.5 ML | Refills: 0 | Status: SHIPPED | OUTPATIENT
Start: 2023-11-20 | End: 2024-05-18

## 2023-11-20 RX ORDER — DOXYCYCLINE HYCLATE 100 MG
100 TABLET ORAL 2 TIMES DAILY
Qty: 14 TABLET | Refills: 0 | Status: SHIPPED | OUTPATIENT
Start: 2023-11-20 | End: 2023-11-27

## 2023-11-20 ASSESSMENT — LIFESTYLE VARIABLES
HOW OFTEN DO YOU HAVE A DRINK CONTAINING ALCOHOL: MONTHLY OR LESS
HOW MANY STANDARD DRINKS CONTAINING ALCOHOL DO YOU HAVE ON A TYPICAL DAY: 1 OR 2

## 2023-11-20 ASSESSMENT — PATIENT HEALTH QUESTIONNAIRE - PHQ9
1. LITTLE INTEREST OR PLEASURE IN DOING THINGS: 0
SUM OF ALL RESPONSES TO PHQ QUESTIONS 1-9: 0
SUM OF ALL RESPONSES TO PHQ9 QUESTIONS 1 & 2: 0
2. FEELING DOWN, DEPRESSED OR HOPELESS: 0
SUM OF ALL RESPONSES TO PHQ QUESTIONS 1-9: 0

## 2023-11-20 NOTE — PROGRESS NOTES
Medicare Annual Wellness Visit    Tashaorrrhianna Do. is here for Medicare AWV (Possible sinus infection - nasal congestion, head pressure, sneezing for about 1 week. ) and Lab Collection (Nonfasting.)    Assessment & Plan   Medicare annual wellness visit, subsequent  Recommend Tdap, Shingrix, influenza vaccine, COVID booster. Since he is ill today, will defer any vaccines today. It sounds like he is willing to do a flu shot, Tdap, but will consider Shingrix and COVID.  -     Tdap (ADACEL) 5-2-15.5 LF-MCG/0.5 injection; Inject 0.5 mLs into the muscle once for 1 dose, Disp-0.5 mL, R-0Print  -     SHINGRIX 50 MCG/0.5ML SUSR injection; Inject 0.5 mLs into the muscle See Admin Instructions 1 dose now and repeat dose in 2-6 months, Disp-0.5 mL, R-0, DAWPrint  Screening for prostate cancer  -     PSA Screening; Future  Acute non-recurrent sinusitis, unspecified location  Symptoms over 1 week. This could be viral, but is not improving and he does have some degree of immune compromise taking Remicade and methotrexate. Will give doxycycline which has worked well in the past.  Saline rinses. -     AMB POC SARS-COV-2  -     doxycycline hyclate (VIBRA-TABS) 100 MG tablet; Take 1 tablet by mouth 2 times daily for 7 days, Disp-14 tablet, R-0Normal  Benign essential HTN  Blood pressure is borderline controlled. Will monitor this closely and continue current medical therapy with valsartan hydrochlorothiazide  -     CBC with Auto Differential; Future  -     Comprehensive Metabolic Panel; Future  -     Lipid Panel; Future  Rheumatoid arthritis with positive rheumatoid factor, involving unspecified site [facility])  This condition appears to be stable and is being evaluated and managed by his specialist Dr. Hoang Murphy. No acute findings today warrant change in management plan.       Ankylosing spondylitis, unspecified site of spine ([address])  This condition appears to be stable and is being evaluated and managed by his specialist

## 2023-11-21 LAB
ALBUMIN SERPL-MCNC: 3.5 G/DL (ref 3.5–5)
ALBUMIN/GLOB SERPL: 1 (ref 1.1–2.2)
ALP SERPL-CCNC: 61 U/L (ref 45–117)
ALT SERPL-CCNC: 34 U/L (ref 12–78)
ANION GAP SERPL CALC-SCNC: 3 MMOL/L (ref 5–15)
AST SERPL-CCNC: 30 U/L (ref 15–37)
BASOPHILS # BLD: 0.1 K/UL (ref 0–0.1)
BASOPHILS NFR BLD: 1 % (ref 0–1)
BILIRUB SERPL-MCNC: 0.5 MG/DL (ref 0.2–1)
BUN SERPL-MCNC: 16 MG/DL (ref 6–20)
BUN/CREAT SERPL: 17 (ref 12–20)
CALCIUM SERPL-MCNC: 9.2 MG/DL (ref 8.5–10.1)
CHLORIDE SERPL-SCNC: 107 MMOL/L (ref 97–108)
CHOLEST SERPL-MCNC: 157 MG/DL
CO2 SERPL-SCNC: 30 MMOL/L (ref 21–32)
CREAT SERPL-MCNC: 0.92 MG/DL (ref 0.7–1.3)
DIFFERENTIAL METHOD BLD: ABNORMAL
EOSINOPHIL # BLD: 0.4 K/UL (ref 0–0.4)
EOSINOPHIL NFR BLD: 6 % (ref 0–7)
ERYTHROCYTE [DISTWIDTH] IN BLOOD BY AUTOMATED COUNT: 14.8 % (ref 11.5–14.5)
EST. AVERAGE GLUCOSE BLD GHB EST-MCNC: 111 MG/DL
GLOBULIN SER CALC-MCNC: 3.6 G/DL (ref 2–4)
GLUCOSE SERPL-MCNC: 86 MG/DL (ref 65–100)
HBA1C MFR BLD: 5.5 % (ref 4–5.6)
HCT VFR BLD AUTO: 45.2 % (ref 36.6–50.3)
HDLC SERPL-MCNC: 42 MG/DL
HDLC SERPL: 3.7 (ref 0–5)
HGB BLD-MCNC: 14 G/DL (ref 12.1–17)
IMM GRANULOCYTES # BLD AUTO: 0 K/UL (ref 0–0.04)
IMM GRANULOCYTES NFR BLD AUTO: 0 % (ref 0–0.5)
LDLC SERPL CALC-MCNC: 68.8 MG/DL (ref 0–100)
LYMPHOCYTES # BLD: 2 K/UL (ref 0.8–3.5)
LYMPHOCYTES NFR BLD: 29 % (ref 12–49)
MCH RBC QN AUTO: 26.7 PG (ref 26–34)
MCHC RBC AUTO-ENTMCNC: 31 G/DL (ref 30–36.5)
MCV RBC AUTO: 86.1 FL (ref 80–99)
MONOCYTES # BLD: 1.4 K/UL (ref 0–1)
MONOCYTES NFR BLD: 19 % (ref 5–13)
NEUTS SEG # BLD: 3.1 K/UL (ref 1.8–8)
NEUTS SEG NFR BLD: 45 % (ref 32–75)
NRBC # BLD: 0 K/UL (ref 0–0.01)
NRBC BLD-RTO: 0 PER 100 WBC
PLATELET # BLD AUTO: 224 K/UL (ref 150–400)
PMV BLD AUTO: 10.7 FL (ref 8.9–12.9)
POTASSIUM SERPL-SCNC: 4.4 MMOL/L (ref 3.5–5.1)
PROT SERPL-MCNC: 7.1 G/DL (ref 6.4–8.2)
PSA SERPL-MCNC: 0.6 NG/ML (ref 0.01–4)
RBC # BLD AUTO: 5.25 M/UL (ref 4.1–5.7)
SODIUM SERPL-SCNC: 140 MMOL/L (ref 136–145)
TRIGL SERPL-MCNC: 231 MG/DL
VLDLC SERPL CALC-MCNC: 46.2 MG/DL
WBC # BLD AUTO: 7 K/UL (ref 4.1–11.1)

## 2024-02-01 DIAGNOSIS — N40.1 BENIGN PROSTATIC HYPERPLASIA WITH LOWER URINARY TRACT SYMPTOMS, SYMPTOM DETAILS UNSPECIFIED: ICD-10-CM

## 2024-02-01 DIAGNOSIS — I10 BENIGN ESSENTIAL HTN: Primary | ICD-10-CM

## 2024-02-01 RX ORDER — TAMSULOSIN HYDROCHLORIDE 0.4 MG/1
CAPSULE ORAL DAILY
Qty: 90 CAPSULE | Refills: 3 | Status: SHIPPED | OUTPATIENT
Start: 2024-02-01

## 2024-02-26 ENCOUNTER — TELEPHONE (OUTPATIENT)
Age: 74
End: 2024-02-26

## 2024-02-26 RX ORDER — DOXYCYCLINE HYCLATE 100 MG
100 TABLET ORAL 2 TIMES DAILY
Qty: 28 TABLET | Refills: 0 | Status: SHIPPED | OUTPATIENT
Start: 2024-02-26 | End: 2024-03-11

## 2024-02-26 RX ORDER — PREDNISONE 20 MG/1
20 TABLET ORAL 2 TIMES DAILY
Qty: 10 TABLET | Refills: 0 | Status: SHIPPED | OUTPATIENT
Start: 2024-02-26 | End: 2024-03-02

## 2024-02-26 NOTE — TELEPHONE ENCOUNTER
Need more information on duration of illness, presence of fever, other symptoms, sick contacts.  Would be good for an e-visit because it will ask him to answer all those questions.

## 2024-02-26 NOTE — TELEPHONE ENCOUNTER
Recurrent infection.  I sent in doxycycline antibiotic and 5 days of prednisone since this has been going on for couple weeks.  He does have significant issues with debility.  Consider dispatch health in the future as well.

## 2024-02-26 NOTE — TELEPHONE ENCOUNTER
Patient is requesting some medication be sent to his pharmacy, he says he has a bad head cold, he is really stuffed up, he was on amoxicillin but that hasn't worked for him, he states he has back issues so that makes it difficult for him to come in.    Please advise   Dustin [phone]   [facility]/pharmacy #[id number] - [facility] [address] [facility] - P [phone] -  [phone]

## 2024-02-26 NOTE — TELEPHONE ENCOUNTER
Spoke with patient and he reports that he completed the E-Visit questions and at the end it told him that he should see his PCP and that he was not able to submit it. He reports that this on and off again sinus and respiratory (wheeze) cough infection has been going on for weeks and will not resolve. He started taking amoxicillin and it had not effect. Advised this is possibly a viral syndrome and may have to run it's course. He is asking if Dr. Hickey can send something in to his pharmacy. Dr. Hickey notified.

## 2024-02-26 NOTE — TELEPHONE ENCOUNTER
The patient is requesting a call back to discuss his my chart. He stated that he has completely the my chart question just like the nurse ask him to do. He stated that he has additional questions.   [phone]

## 2024-02-26 NOTE — TELEPHONE ENCOUNTER
Spoke with patient and advised to initiate an E-Visit with Dr. Hickey via his MyChart. Provided [facility] Help Desk [phone]. He states understanding and grateful for the call.

## 2024-02-27 NOTE — TELEPHONE ENCOUNTER
T/C to patient to update on Dr. Hickey's comments and scripts sent to his pharmacy for Doxycycline and Prednisone for his respiratory symptoms. Provided number for [facility] for future use if necessary. No answer and LVM. [employer] message sent.

## 2024-03-01 ENCOUNTER — TELEPHONE (OUTPATIENT)
Age: 74
End: 2024-03-01

## 2024-03-01 NOTE — TELEPHONE ENCOUNTER
Patient was calling to inform provider that he did not  the medications that were called in  He started to feel worse and went to [facility] who admitted him for a day  I did schedule Hospital Follow up for 03/07 at 1120 AM but he wanted to make sure you were aware

## 2024-03-07 ENCOUNTER — OFFICE VISIT (OUTPATIENT)
Age: 74
End: 2024-03-07
Payer: MEDICARE

## 2024-03-07 VITALS
BODY MASS INDEX: 36.45 KG/M2 | OXYGEN SATURATION: 93 % | HEART RATE: 73 BPM | DIASTOLIC BLOOD PRESSURE: 79 MMHG | RESPIRATION RATE: 19 BRPM | HEIGHT: 78 IN | WEIGHT: 315 LBS | TEMPERATURE: 97.9 F | SYSTOLIC BLOOD PRESSURE: 133 MMHG

## 2024-03-07 DIAGNOSIS — J06.9 URI WITH COUGH AND CONGESTION: Primary | ICD-10-CM

## 2024-03-07 DIAGNOSIS — E66.01 OBESITY, CLASS III, BMI 40-49.9 (MORBID OBESITY) (HCC): ICD-10-CM

## 2024-03-07 DIAGNOSIS — M05.9 RHEUMATOID ARTHRITIS WITH POSITIVE RHEUMATOID FACTOR, INVOLVING UNSPECIFIED SITE (HCC): ICD-10-CM

## 2024-03-07 PROBLEM — J01.90 ACUTE SINUSITIS: Status: ACTIVE | Noted: 2017-02-08

## 2024-03-07 PROCEDURE — 1036F TOBACCO NON-USER: CPT | Performed by: INTERNAL MEDICINE

## 2024-03-07 PROCEDURE — 3075F SYST BP GE 130 - 139MM HG: CPT | Performed by: INTERNAL MEDICINE

## 2024-03-07 PROCEDURE — G8484 FLU IMMUNIZE NO ADMIN: HCPCS | Performed by: INTERNAL MEDICINE

## 2024-03-07 PROCEDURE — 1123F ACP DISCUSS/DSCN MKR DOCD: CPT | Performed by: INTERNAL MEDICINE

## 2024-03-07 PROCEDURE — G8417 CALC BMI ABV UP PARAM F/U: HCPCS | Performed by: INTERNAL MEDICINE

## 2024-03-07 PROCEDURE — G8428 CUR MEDS NOT DOCUMENT: HCPCS | Performed by: INTERNAL MEDICINE

## 2024-03-07 PROCEDURE — 3017F COLORECTAL CA SCREEN DOC REV: CPT | Performed by: INTERNAL MEDICINE

## 2024-03-07 PROCEDURE — 99213 OFFICE O/P EST LOW 20 MIN: CPT | Performed by: INTERNAL MEDICINE

## 2024-03-07 PROCEDURE — 3078F DIAST BP <80 MM HG: CPT | Performed by: INTERNAL MEDICINE

## 2024-03-07 RX ORDER — AMOXICILLIN 500 MG/1
CAPSULE ORAL
COMMUNITY

## 2024-03-07 RX ORDER — PREDNISONE 10 MG/1
TABLET ORAL
COMMUNITY
End: 2024-03-08 | Stop reason: ALTCHOICE

## 2024-03-07 RX ORDER — MOMETASONE FUROATE AND FORMOTEROL FUMARATE DIHYDRATE 100; 5 UG/1; UG/1
AEROSOL RESPIRATORY (INHALATION)
COMMUNITY
Start: 2024-03-05

## 2024-03-07 ASSESSMENT — PATIENT HEALTH QUESTIONNAIRE - PHQ9
SUM OF ALL RESPONSES TO PHQ QUESTIONS 1-9: 0
SUM OF ALL RESPONSES TO PHQ QUESTIONS 1-9: 0
1. LITTLE INTEREST OR PLEASURE IN DOING THINGS: 0
SUM OF ALL RESPONSES TO PHQ QUESTIONS 1-9: 0
SUM OF ALL RESPONSES TO PHQ QUESTIONS 1-9: 0
2. FEELING DOWN, DEPRESSED OR HOPELESS: 0
SUM OF ALL RESPONSES TO PHQ9 QUESTIONS 1 & 2: 0

## 2024-03-07 NOTE — PROGRESS NOTES
HISTORY OF PRESENT ILLNESS    Chief Complaint   Patient presents with    Follow-Up from Hospital     Respiratory issues - [place] Man        Presents for follow-up    Admitted to  2/26 - 2/29/24 for cough, SOB, tachycardia, low oxygen sat.   Records not available today.  Patient is good historian.  Reports tests for Covid, flu neg  CT scan done which showed no PNA or PE.    Duplex neg for DVT  Echo did not show a cause for SOB, per pt.  Rx  for doxy 2/26/24 14 days by me.  Did not take since he was admitted.    On discharge, was given Rx Dulera, prednisone. No Abx given   Wheezing improving now and is very mild  Reports mild clear sputum now.  No fever, chills, SOB.      Appt pending with pulmonary next week for hospital follow-up.  No past hx of this.      Review of Systems   All other systems reviewed and are negative, except as noted in HPI    Past Medical and Surgical History   has a past medical history of Abnormal stress test, Ankylosing spondylitis (HCC), Benign essential HTN, BPH (benign prostatic hyperplasia), Chronic edema, Colon polyp, DJD (degenerative joint disease) of cervical spine, NELSON (dyspnea on exertion), History of basal cell cancer, History of bilateral knee replacement, History of DVT (deep vein thrombosis), History of vascular access device, Insomnia, Kidney stone, Lumbar spinal stenosis, Neuropathy, VERONICA on CPAP, Psoriasis, Rheumatoid arthritis of cervical spine (HCC), and SOB (shortness of breath).     has a past surgical history that includes Shoulder arthroscopy (Left); back surgery (2022); lumbar laminectomy (2022); Cardiac catheterization (01/03/2022); Cardiac catheterization (2014); Colonoscopy; Colonoscopy (10/15/2019); Mohs surgery (2013?); Total knee arthroplasty (Bilateral); Wrist fracture surgery (Right); and Lithotripsy.     reports that he quit smoking about 38 years ago. His smoking use included cigarettes. He started smoking about 53 years ago. He has a 15.0 pack-year

## 2024-04-17 ENCOUNTER — OFFICE VISIT (OUTPATIENT)
Age: 74
End: 2024-04-17
Payer: MEDICARE

## 2024-04-17 VITALS
TEMPERATURE: 98.2 F | OXYGEN SATURATION: 92 % | SYSTOLIC BLOOD PRESSURE: 137 MMHG | HEIGHT: 78 IN | BODY MASS INDEX: 36.45 KG/M2 | RESPIRATION RATE: 14 BRPM | WEIGHT: 315 LBS | HEART RATE: 66 BPM | DIASTOLIC BLOOD PRESSURE: 81 MMHG

## 2024-04-17 DIAGNOSIS — R05.1 ACUTE COUGH: Primary | ICD-10-CM

## 2024-04-17 PROCEDURE — 99212 OFFICE O/P EST SF 10 MIN: CPT | Performed by: INTERNAL MEDICINE

## 2024-04-17 RX ORDER — AMOXICILLIN AND CLAVULANATE POTASSIUM 875; 125 MG/1; MG/1
1 TABLET, FILM COATED ORAL 2 TIMES DAILY
Qty: 14 TABLET | Refills: 0 | Status: CANCELLED | OUTPATIENT
Start: 2024-04-17 | End: 2024-04-24

## 2024-04-17 ASSESSMENT — PATIENT HEALTH QUESTIONNAIRE - PHQ9
SUM OF ALL RESPONSES TO PHQ QUESTIONS 1-9: 0
2. FEELING DOWN, DEPRESSED OR HOPELESS: NOT AT ALL
SUM OF ALL RESPONSES TO PHQ QUESTIONS 1-9: 0
SUM OF ALL RESPONSES TO PHQ9 QUESTIONS 1 & 2: 0
1. LITTLE INTEREST OR PLEASURE IN DOING THINGS: NOT AT ALL
SUM OF ALL RESPONSES TO PHQ QUESTIONS 1-9: 0
SUM OF ALL RESPONSES TO PHQ QUESTIONS 1-9: 0

## 2024-04-17 NOTE — PROGRESS NOTES
A/P:    1. Acute cough  Likely allergic rhinitis. He has no acute pulmonary findings, dyspnea or hypoxia. Will recommend he use cetirizine 10 mg once daily, avoid regular Afrin use, and follow-up if his symptoms worsen or fail to improve.  I also recommended he reschedule with a pulmonary appointment       Follow up:  prn         An electronic signature was used to authenticate this note.    --Gladis Vasquez MD         HPI: Dustin Guy Jr. is here for an acute visit.      Cough: Mr. Guy has been sick for a couple days, with symptoms of sneezing, runny nose, some cough--production of green mucus only once yesterday--and congestion. There are no symptoms of fever, chills, dyspnea, wheezing (maybe a slight wheeze at the end), diarrhea, vomiting, chest pain. He has been taking Afrin (once), and Zyrtec. He now has issues with spring allergies, but does not usually take medication daily.    He was scheduled to see pulmonary after his admission in February with hypoxia and dyspnea, cough--treated ultimately with Dulera and prednisone, no diagnosis.      Current Outpatient Medications:     DULERA 100-5 MCG/ACT inhaler, INHALE TWO PUFFS BY MOUTH EVERY DAY, Disp: , Rfl:     tamsulosin (FLOMAX) 0.4 MG capsule, TAKE 1 CAPSULE BY MOUTH EVERY DAY, Disp: 90 capsule, Rfl: 3    pseudoephedrine (SUDAFED) 30 MG tablet, Take 1 tablet by mouth every 4 hours as needed for Congestion, Disp: , Rfl:     SHINGRIX 50 MCG/0.5ML SUSR injection, Inject 0.5 mLs into the muscle See Admin Instructions 1 dose now and repeat dose in 2-6 months, Disp: 0.5 mL, Rfl: 0    clobetasol (TEMOVATE) 0.05 % cream, APPLY A SMALL AMOUNT TO AFFECTED AREA TWICE DAILY, Disp: , Rfl:     clotrimazole (LOTRIMIN) 1 % cream, Apply topically 2 times daily Apply topically 2 times daily., Disp: , Rfl:     vitamin B-12 (CYANOCOBALAMIN) 1000 MCG tablet, Take 1 tablet by mouth daily, Disp: , Rfl:     inFLIXimab (REMICADE) 100 MG injection, Infuse 90 mLs

## 2024-04-25 DIAGNOSIS — I10 BENIGN ESSENTIAL HTN: ICD-10-CM

## 2024-04-25 RX ORDER — VALSARTAN AND HYDROCHLOROTHIAZIDE 160; 12.5 MG/1; MG/1
1 TABLET, FILM COATED ORAL DAILY
Qty: 90 TABLET | Refills: 2 | Status: SHIPPED | OUTPATIENT
Start: 2024-04-25

## 2024-10-31 ENCOUNTER — OFFICE VISIT (OUTPATIENT)
Age: 74
End: 2024-10-31
Payer: MEDICARE

## 2024-10-31 VITALS
SYSTOLIC BLOOD PRESSURE: 125 MMHG | TEMPERATURE: 98.2 F | RESPIRATION RATE: 19 BRPM | HEART RATE: 78 BPM | OXYGEN SATURATION: 95 % | HEIGHT: 78 IN | WEIGHT: 315 LBS | DIASTOLIC BLOOD PRESSURE: 75 MMHG | BODY MASS INDEX: 36.45 KG/M2

## 2024-10-31 DIAGNOSIS — I10 BENIGN ESSENTIAL HTN: ICD-10-CM

## 2024-10-31 DIAGNOSIS — E66.813 CLASS 3 SEVERE OBESITY DUE TO EXCESS CALORIES WITH SERIOUS COMORBIDITY AND BODY MASS INDEX (BMI) OF 45.0 TO 49.9 IN ADULT: Primary | ICD-10-CM

## 2024-10-31 DIAGNOSIS — G47.33 OSA ON CPAP: ICD-10-CM

## 2024-10-31 DIAGNOSIS — R73.01 IFG (IMPAIRED FASTING GLUCOSE): ICD-10-CM

## 2024-10-31 DIAGNOSIS — Z23 NEEDS FLU SHOT: ICD-10-CM

## 2024-10-31 DIAGNOSIS — M45.0 ANKYLOSING SPONDYLITIS OF MULTIPLE SITES IN SPINE (HCC): ICD-10-CM

## 2024-10-31 DIAGNOSIS — R73.03 PRE-DIABETES: ICD-10-CM

## 2024-10-31 DIAGNOSIS — E66.01 CLASS 3 SEVERE OBESITY DUE TO EXCESS CALORIES WITH SERIOUS COMORBIDITY AND BODY MASS INDEX (BMI) OF 45.0 TO 49.9 IN ADULT: Primary | ICD-10-CM

## 2024-10-31 PROCEDURE — 99214 OFFICE O/P EST MOD 30 MIN: CPT | Performed by: INTERNAL MEDICINE

## 2024-10-31 PROCEDURE — 90653 IIV ADJUVANT VACCINE IM: CPT | Performed by: INTERNAL MEDICINE

## 2024-10-31 RX ORDER — SEMAGLUTIDE 0.25 MG/.5ML
0.25 INJECTION, SOLUTION SUBCUTANEOUS
Qty: 2 ML | Refills: 0 | Status: SHIPPED | OUTPATIENT
Start: 2024-10-31

## 2024-10-31 SDOH — ECONOMIC STABILITY: FOOD INSECURITY: WITHIN THE PAST 12 MONTHS, THE FOOD YOU BOUGHT JUST DIDN'T LAST AND YOU DIDN'T HAVE MONEY TO GET MORE.: NEVER TRUE

## 2024-10-31 SDOH — ECONOMIC STABILITY: FOOD INSECURITY: WITHIN THE PAST 12 MONTHS, YOU WORRIED THAT YOUR FOOD WOULD RUN OUT BEFORE YOU GOT MONEY TO BUY MORE.: NEVER TRUE

## 2024-10-31 SDOH — ECONOMIC STABILITY: INCOME INSECURITY: HOW HARD IS IT FOR YOU TO PAY FOR THE VERY BASICS LIKE FOOD, HOUSING, MEDICAL CARE, AND HEATING?: NOT HARD AT ALL

## 2024-10-31 NOTE — PROGRESS NOTES
\"Have you been to the ER, urgent care clinic since your last visit?  Hospitalized since your last visit?\"    NO    “Have you seen or consulted any other health care providers outside our system since your last visit?”    YES - When: approximately 2 months ago.  Where and Why: Dr. Cantu - rheumatology.      “Have you had a colorectal cancer screening such as a colonoscopy/FIT/Cologuard?    YES - Type: Colonoscopy - Where: 9/2024 [facility] Nurse/CMA to request most recent records if not in the chart     Date of last Colonoscopy: 10/15/2019  No cologuard on file  No FIT/FOBT on file   No flexible sigmoidoscopy on file                Patient present for routine immunizations.   Pt denies any symptoms , reactions or allergies that would exclude them from being immunized today.  Risks and adverse reactions were discussed and the VIS was given to them. All questions were addressed.  Pt was observed for 10 min post injection. There were no reactions observed.    
topically 2 times daily.    vitamin B-12 (CYANOCOBALAMIN) 1000 MCG tablet Take 1 tablet by mouth daily    inFLIXimab (REMICADE) 100 MG injection Infuse 90 mLs intravenously See Admin Instructions    methotrexate Sodium 50 MG/2ML chemo injection Inject 1 mL into the skin once a week    cetirizine (ZYRTEC) 10 MG tablet Take by mouth as needed    cyclobenzaprine (FLEXERIL) 10 MG tablet Take by mouth 3 times daily as needed    diclofenac (VOLTAREN) 75 MG EC tablet Take by mouth 2 times daily    fluticasone (FLONASE) 50 MCG/ACT nasal spray 2 sprays by Nasal route daily    folic acid (FOLVITE) 1 MG tablet TAKE 1 TABLET BY MOUTH EVERY DAY    senna-docusate (PERICOLACE) 8.6-50 MG per tablet Take 1 tablet by mouth 2 times daily     No current facility-administered medications for this visit.

## 2024-11-11 ENCOUNTER — OFFICE VISIT (OUTPATIENT)
Age: 74
End: 2024-11-11
Payer: MEDICARE

## 2024-11-11 VITALS
SYSTOLIC BLOOD PRESSURE: 142 MMHG | DIASTOLIC BLOOD PRESSURE: 84 MMHG | HEIGHT: 78 IN | RESPIRATION RATE: 19 BRPM | BODY MASS INDEX: 36.45 KG/M2 | TEMPERATURE: 97.9 F | OXYGEN SATURATION: 93 % | HEART RATE: 78 BPM | WEIGHT: 315 LBS

## 2024-11-11 DIAGNOSIS — E66.813 CLASS 3 SEVERE OBESITY DUE TO EXCESS CALORIES WITH SERIOUS COMORBIDITY AND BODY MASS INDEX (BMI) OF 45.0 TO 49.9 IN ADULT: Primary | ICD-10-CM

## 2024-11-11 DIAGNOSIS — E66.01 CLASS 3 SEVERE OBESITY DUE TO EXCESS CALORIES WITH SERIOUS COMORBIDITY AND BODY MASS INDEX (BMI) OF 45.0 TO 49.9 IN ADULT: Primary | ICD-10-CM

## 2024-11-11 PROBLEM — I95.1 ORTHOSTATIC HYPOTENSION: Status: ACTIVE | Noted: 2024-11-11

## 2024-11-11 PROBLEM — R20.9 DISTURBANCE OF SKIN SENSATION: Status: ACTIVE | Noted: 2024-11-11

## 2024-11-11 PROBLEM — G62.9 POLYNEUROPATHY: Status: ACTIVE | Noted: 2024-11-11

## 2024-11-11 PROCEDURE — 99213 OFFICE O/P EST LOW 20 MIN: CPT | Performed by: INTERNAL MEDICINE

## 2024-11-11 NOTE — PROGRESS NOTES
HISTORY OF PRESENT ILLNESS    Chief Complaint   Patient presents with    Medication Check     Wegovy       Presents for follow-up    ***    Review of Systems   All other systems reviewed and are negative, except as noted in HPI    Past Medical and Surgical History   has a past medical history of Abnormal stress test, Ankylosing spondylitis (HCC), Benign essential HTN, BPH (benign prostatic hyperplasia), Chronic edema, Colon polyp, DJD (degenerative joint disease) of cervical spine, NELSON (dyspnea on exertion), History of basal cell cancer, History of bilateral knee replacement, History of DVT (deep vein thrombosis), History of vascular access device, Insomnia, Kidney stone, Lumbar spinal stenosis, Neuropathy, VERONICA on CPAP, Psoriasis, Rheumatoid arthritis of cervical spine (HCC), and SOB (shortness of breath).     has a past surgical history that includes Shoulder arthroscopy (Left); back surgery (2022); lumbar laminectomy (2022); Cardiac catheterization (01/03/2022); Cardiac catheterization (2014); Colonoscopy; Colonoscopy (10/15/2019); Mohs surgery (2013?); Total knee arthroplasty (Bilateral); Wrist fracture surgery (Right); Lithotripsy; and Colonoscopy (10/2024).     reports that he quit smoking about 38 years ago. His smoking use included cigarettes. He started smoking about 53 years ago. He has a 15 pack-year smoking history. He has never used smokeless tobacco. He reports current alcohol use. He reports that he does not use drugs.    family history includes Heart Disease in his father; Osteoarthritis in his father and sister.    Physical Exam   Nursing note and vitals reviewed.  Blood pressure (!) 142/84, pulse 78, temperature 97.9 °F (36.6 °C), temperature source Oral, resp. rate 19, height 2.032 m (6' 8\"), weight (!) 193.6 kg (426 lb 12.8 oz), SpO2 93%.  Constitutional:  No distress.    Eyes: Conjunctivae are normal.   Ears:  Hearing grossly intact  Cardiovascular: Normal rate. regular rhythm, no murmurs or

## 2024-11-11 NOTE — PROGRESS NOTES
\"Have you been to the ER, urgent care clinic since your last visit?  Hospitalized since your last visit?\"    NO    “Have you seen or consulted any other health care providers outside our system since your last visit?”    NO      “Have you had a colorectal cancer screening such as a colonoscopy/FIT/Cologuard?    YES - Type: Colonoscopy - Where: 10/2024 - [facility]AD to request most recent records if not in the chart     Date of last Colonoscopy: 10/15/2019  No cologuard on file  No FIT/FOBT on file   No flexible sigmoidoscopy on file

## 2024-11-12 NOTE — PROGRESS NOTES
HISTORY OF PRESENT ILLNESS    Chief Complaint   Patient presents with    Medication Check     Wegovy       Presents for follow-up    Severe obesity  His rheumatologist, Dr. Cantu, asked him to come talk to me about Ozempic for weight loss.   Has maintained a BMI above 45 for years.   Significant abdominal obesity has contributed to ongoing lower back pain with ankylosing spondylitis, bilateral knee pain and history of replacement.  He has concomitant obstructive sleep apnea, hypertension.  Tried low calorie and low carb diet and restricted calorie diet below 1500 sury daily for several weeks with minimal impact..    Difficulty exercising due to back pain and neck pain from enclosing spondylitis.  He walks with a cane.  Wt Readings from Last 3 Encounters:   11/11/24 (!) 193.6 kg (426 lb 12.8 oz)   10/31/24 (!) 192.8 kg (425 lb)   04/17/24 (!) 191.8 kg (422 lb 12.8 oz)   Body mass index is 46.89 kg/m².    Wegovy was not approved by Medicare since he has no known history of cardiovascular disease, infarction or stroke    Review of Systems   All other systems reviewed and are negative, except as noted in HPI    Past Medical and Surgical History   has a past medical history of Abnormal stress test, Ankylosing spondylitis (HCC), Benign essential HTN, BPH (benign prostatic hyperplasia), Chronic edema, Colon polyp, DJD (degenerative joint disease) of cervical spine, NELSON (dyspnea on exertion), History of basal cell cancer, History of bilateral knee replacement, History of DVT (deep vein thrombosis), History of vascular access device, Insomnia, Kidney stone, Lumbar spinal stenosis, Neuropathy, VERONICA on CPAP, Psoriasis, Rheumatoid arthritis of cervical spine (HCC), and SOB (shortness of breath).     has a past surgical history that includes Shoulder arthroscopy (Left); back surgery (2022); lumbar laminectomy (2022); Cardiac catheterization (01/03/2022); Cardiac catheterization (2014); Colonoscopy; Colonoscopy (10/15/2019);

## 2024-11-29 ENCOUNTER — OFFICE VISIT (OUTPATIENT)
Age: 74
End: 2024-11-29

## 2024-11-29 VITALS
HEART RATE: 85 BPM | WEIGHT: 315 LBS | SYSTOLIC BLOOD PRESSURE: 139 MMHG | DIASTOLIC BLOOD PRESSURE: 71 MMHG | OXYGEN SATURATION: 96 % | RESPIRATION RATE: 18 BRPM | BODY MASS INDEX: 36.45 KG/M2 | TEMPERATURE: 98.3 F | HEIGHT: 78 IN

## 2024-11-29 DIAGNOSIS — U07.1 COVID: ICD-10-CM

## 2024-11-29 DIAGNOSIS — R09.81 CONGESTION OF NASAL SINUS: ICD-10-CM

## 2024-11-29 DIAGNOSIS — R68.83 CHILLS: Primary | ICD-10-CM

## 2024-11-29 LAB
INFLUENZA A ANTIGEN, POC: NEGATIVE
INFLUENZA B ANTIGEN, POC: NEGATIVE
Lab: ABNORMAL
PERFORMING INSTRUMENT: ABNORMAL
QC PASS/FAIL: ABNORMAL
SARS-COV-2, POC: DETECTED

## 2024-11-29 NOTE — PATIENT INSTRUCTIONS
Viral Covid infection does not require antibiotic as they do not treat / help viral illnesses. Viral symptoms will usually improve over the next week, but lingering cough or congestion can take several weeks to completely resolve - this is normal.   To prevent dehydration, drink plenty of fluids. Choose water and other clear liquids until you feel better.   Take an over-the-counter pain medicine, such as acetaminophen (Tylenol), ibuprofen (Advil, Motrin), or naproxen (Aleve) for fever or pain, can take Tylenol and Motrin together for more significant pain or can alternate every 4 hours for pain / fever. Be safe with medicines. Read and follow all instructions on the label. No one younger than 18 should take aspirin. It has been linked to Reye syndrome, a serious illness.  Be careful when taking over-the-counter cold or influenza (flu) medicines and Tylenol at the same time. Many of these medicines have acetaminophen, which is Tylenol. Read the labels to make sure that you are not taking more than the recommended dose. Too much acetaminophen (Tylenol) can be harmful.  Get plenty of rest.  Use saline (saltwater) nasal washes to help keep your nasal passages open and wash out mucus and allergens. You can buy saline nose sprays at a grocery store or drugstore. Follow the instructions on the package.   Use a vaporizer or humidifier to add moisture to your bedroom. Follow the instructions for cleaning the machine.   Recommend warm tea with honey, saltwater gargles and other natural remedies for symptoms.   Do not smoke or allow others to smoke around you. If you need help quitting, talk to your doctor about stop-smoking programs and medicines. These can increase your chances of quitting for good.  Return to clinic if symptoms change, fever, or mild worsening of symptoms.  Go to ER immediately if chest pain, shortness of breath, dehydration, high or persistent fevers or any new concerning symptoms.  Follow up with PCP in

## 2024-11-29 NOTE — PROGRESS NOTES
in his father and sister.  Social: Pt   Social History     Socioeconomic History    Marital status: Single     Spouse name: 0    Number of children: 0    Years of education: Not on file    Highest education level: Not on file   Occupational History    Not on file   Tobacco Use    Smoking status: Former     Current packs/day: 0.00     Average packs/day: 1 pack/day for 15.0 years (15.0 ttl pk-yrs)     Types: Cigarettes     Start date: 1971     Quit date: 1986     Years since quittin.9    Smokeless tobacco: Never   Vaping Use    Vaping status: Never Used   Substance and Sexual Activity    Alcohol use: Yes    Drug use: Never    Sexual activity: Not Currently   Other Topics Concern    Not on file   Social History Narrative    Not on file     Social Determinants of Health     Financial Resource Strain: Low Risk  (10/31/2024)    Overall Financial Resource Strain (CARDIA)     Difficulty of Paying Living Expenses: Not hard at all   Food Insecurity: No Food Insecurity (10/31/2024)    Hunger Vital Sign     Worried About Running Out of Food in the Last Year: Never true     Ran Out of Food in the Last Year: Never true   Transportation Needs: Unknown (10/31/2024)    PRAPARE - Transportation     Lack of Transportation (Medical): Not on file     Lack of Transportation (Non-Medical): No   Physical Activity: Inactive (2023)    Exercise Vital Sign     Days of Exercise per Week: 0 days     Minutes of Exercise per Session: 0 min   Stress: No Stress Concern Present (2023)    [employer] - Occupational Stress Questionnaire     Feeling of Stress : Not at all   Social Connections: Socially Isolated (2023)    Social Connection and Isolation Panel [NHANES]     Frequency of Communication with Friends and Family: More than three times a week     Frequency of Social Gatherings with Friends and Family: More than three times a week     Attends [employer]: Never     Active Member of

## 2024-12-10 ENCOUNTER — OFFICE VISIT (OUTPATIENT)
Age: 74
End: 2024-12-10

## 2024-12-10 VITALS
OXYGEN SATURATION: 92 % | SYSTOLIC BLOOD PRESSURE: 135 MMHG | TEMPERATURE: 97.8 F | BODY MASS INDEX: 36.45 KG/M2 | RESPIRATION RATE: 16 BRPM | HEART RATE: 91 BPM | HEIGHT: 78 IN | WEIGHT: 315 LBS | DIASTOLIC BLOOD PRESSURE: 87 MMHG

## 2024-12-10 DIAGNOSIS — B96.89 ACUTE BACTERIAL SINUSITIS: Primary | ICD-10-CM

## 2024-12-10 DIAGNOSIS — R03.0 ELEVATED BLOOD PRESSURE READING: ICD-10-CM

## 2024-12-10 DIAGNOSIS — J01.90 ACUTE BACTERIAL SINUSITIS: Primary | ICD-10-CM

## 2024-12-10 DIAGNOSIS — H61.22 IMPACTED CERUMEN OF LEFT EAR: ICD-10-CM

## 2024-12-10 DIAGNOSIS — R68.89 FLU-LIKE SYMPTOMS: ICD-10-CM

## 2024-12-10 LAB
INFLUENZA A ANTIGEN, POC: NORMAL
INFLUENZA B ANTIGEN, POC: NORMAL
Lab: NORMAL
PERFORMING INSTRUMENT: NORMAL
QC PASS/FAIL: NORMAL
SARS-COV-2, POC: NORMAL

## 2024-12-10 NOTE — PROGRESS NOTES
12/10/2024   Dustin Guy Jr. (: 1950) is a 73 y.o. male, New patient, here for evaluation of the following chief complaint(s):  Other (Pt c/o seen about 3 weeks ago and dx with covid. He is now having sinus drainage, and body aches.)     ASSESSMENT/PLAN:  Below is the assessment and plan developed based on review of pertinent history, physical exam, labs, studies, and medications.       Assessment & Plan  Acute bacterial sinusitis  The exam does not reveal orbital/periorbital cellulitis, intracranial abscess or meningitis.  Pt does not present with symptoms that are concerning for complicated acute bacterial rhinosinusitis such as high, persistent fevers >102F; periorbital edema, inflammation, or erythema; cranial nerve palsies; abnormal extraocular movements; proptosis; vision changes (double vision or impaired vision); severe headache; altered mental status; or meningeal signs.  Patient is nontoxic appearing and not in need of emergent medical intervention.    -Provided educational material and patient instructions  -Discharged patient with instructions to follow up with PCP  -Advised to go immediately to the ED for worsening or persistent symptoms     Orders:    amoxicillin-clavulanate (AUGMENTIN) 875-125 MG per tablet; Take 1 tablet by mouth 2 times daily for 7 days    Impacted cerumen of left ear  There is no evidence for significant complications at this time. Patient is alert and completely non-toxic. There is no evidence for tympanic membrane rupture. There is no mastoid swelling. There is no evidence for otitis externa or otitis media based on examination. There is no suggestion of systemic complications or sepsis.    -Discharged patient with instructions to follow up with ENT for recurrent symptoms  -Advised to go immediately to the ED for worsening or persistent symptoms     Orders:    REMOVAL IMPACTED CERUMEN IRRIGATION/LVG UNILAT    Flu-like symptoms  OTC medication for comfort  Orders:

## 2024-12-13 ASSESSMENT — ENCOUNTER SYMPTOMS
GASTROINTESTINAL NEGATIVE: 1
ALLERGIC/IMMUNOLOGIC NEGATIVE: 1
SINUS PAIN: 1
SINUS PRESSURE: 1
RESPIRATORY NEGATIVE: 1
EYES NEGATIVE: 1
RHINORRHEA: 1

## 2025-01-29 DIAGNOSIS — I10 BENIGN ESSENTIAL HTN: ICD-10-CM

## 2025-01-29 RX ORDER — VALSARTAN AND HYDROCHLOROTHIAZIDE 160; 12.5 MG/1; MG/1
1 TABLET, FILM COATED ORAL DAILY
Qty: 90 TABLET | Refills: 2 | Status: SHIPPED | OUTPATIENT
Start: 2025-01-29

## 2025-02-05 DIAGNOSIS — I10 BENIGN ESSENTIAL HTN: ICD-10-CM

## 2025-02-05 DIAGNOSIS — N40.1 BENIGN PROSTATIC HYPERPLASIA WITH LOWER URINARY TRACT SYMPTOMS, SYMPTOM DETAILS UNSPECIFIED: ICD-10-CM

## 2025-02-05 RX ORDER — TAMSULOSIN HYDROCHLORIDE 0.4 MG/1
CAPSULE ORAL DAILY
Qty: 90 CAPSULE | Refills: 3 | Status: SHIPPED | OUTPATIENT
Start: 2025-02-05

## 2025-02-25 ENCOUNTER — TELEPHONE (OUTPATIENT)
Facility: CLINIC | Age: 75
End: 2025-02-25

## 2025-02-25 NOTE — TELEPHONE ENCOUNTER
Spoke with patient and he reports he is okay with an increase to his Semaglutide at this time and currently tolerating it okay. Dr. Hickey notified.

## 2025-02-25 NOTE — TELEPHONE ENCOUNTER
We started semaglutide 0.25 mg in October.  We could have increased it sooner.  How much weight has he lost?  I assume he is tolerating it well without significant side effects.  We deftly can increase to 0.5 mg for 1 month, then 1 mg if tolerable

## 2025-02-25 NOTE — TELEPHONE ENCOUNTER
Spoke with patient and requested his current weight. Patient states he has not weighed himself and has no idea how much weight he has lost. He will call back tomorrow and he will weigh himself in the morning. He will schedule a 3 month follow up then also. Grateful for the call.  Dr. Hickey notified.

## 2025-02-25 NOTE — TELEPHONE ENCOUNTER
The patient is requesting a Rx refill on the medication listed below: Semaglutide  He stated that the pharmacy reach out twice about renewing the refill. He would like a update on the refill status. [phone]   [facility] - [address] [place] [address] [address] - P [phone] - F [phone]

## 2025-02-26 ENCOUNTER — TELEPHONE (OUTPATIENT)
Facility: CLINIC | Age: 75
End: 2025-02-26

## 2025-02-26 NOTE — TELEPHONE ENCOUNTER
Spoke with patient an updated on DR. Hickey's comments and recommendations regarding his Semaglutide increased and script is ready for  at reception.  He states understanding and grateful for the call.

## 2025-02-26 NOTE — TELEPHONE ENCOUNTER
I wrote prescription for semaglutide 1 mg injection, inject 1 mg weekly.  Prescription written for 4 mL vial which should last 2.5 months.  Cost may be $495.  Prescription placed on your keyboard.    Compounded semaglutide may no longer be available because of changes in FDA regulations.  So, I encourage him to get the 2.5-month supply now.

## 2025-02-26 NOTE — TELEPHONE ENCOUNTER
Spoke with patient and he reports that he has lost 10 lbs since starting Wegovy. Currently on 0.5 mg. He is okay with an increase. This needs to go to [facility]. He reports that he needs to be able to pick it up today to stay on schedule and due to travel  tomorrow. Dr. Hickey notified.

## 2025-03-05 ENCOUNTER — OFFICE VISIT (OUTPATIENT)
Facility: CLINIC | Age: 75
End: 2025-03-05
Payer: MEDICARE

## 2025-03-05 VITALS
HEART RATE: 90 BPM | OXYGEN SATURATION: 93 % | WEIGHT: 315 LBS | DIASTOLIC BLOOD PRESSURE: 84 MMHG | SYSTOLIC BLOOD PRESSURE: 132 MMHG | TEMPERATURE: 97.9 F | HEIGHT: 78 IN | BODY MASS INDEX: 36.45 KG/M2

## 2025-03-05 DIAGNOSIS — M45.0 ANKYLOSING SPONDYLITIS OF MULTIPLE SITES IN SPINE (HCC): ICD-10-CM

## 2025-03-05 DIAGNOSIS — E66.01 OBESITY, CLASS III, BMI 40-49.9 (MORBID OBESITY): Primary | ICD-10-CM

## 2025-03-05 PROCEDURE — 1123F ACP DISCUSS/DSCN MKR DOCD: CPT | Performed by: INTERNAL MEDICINE

## 2025-03-05 PROCEDURE — 99213 OFFICE O/P EST LOW 20 MIN: CPT | Performed by: INTERNAL MEDICINE

## 2025-03-05 PROCEDURE — 1036F TOBACCO NON-USER: CPT | Performed by: INTERNAL MEDICINE

## 2025-03-05 PROCEDURE — G8427 DOCREV CUR MEDS BY ELIG CLIN: HCPCS | Performed by: INTERNAL MEDICINE

## 2025-03-05 PROCEDURE — 3079F DIAST BP 80-89 MM HG: CPT | Performed by: INTERNAL MEDICINE

## 2025-03-05 PROCEDURE — 3017F COLORECTAL CA SCREEN DOC REV: CPT | Performed by: INTERNAL MEDICINE

## 2025-03-05 PROCEDURE — G8417 CALC BMI ABV UP PARAM F/U: HCPCS | Performed by: INTERNAL MEDICINE

## 2025-03-05 PROCEDURE — 3075F SYST BP GE 130 - 139MM HG: CPT | Performed by: INTERNAL MEDICINE

## 2025-03-05 PROCEDURE — 1159F MED LIST DOCD IN RCRD: CPT | Performed by: INTERNAL MEDICINE

## 2025-03-05 PROCEDURE — 1126F AMNT PAIN NOTED NONE PRSNT: CPT | Performed by: INTERNAL MEDICINE

## 2025-03-05 SDOH — ECONOMIC STABILITY: FOOD INSECURITY: WITHIN THE PAST 12 MONTHS, THE FOOD YOU BOUGHT JUST DIDN'T LAST AND YOU DIDN'T HAVE MONEY TO GET MORE.: NEVER TRUE

## 2025-03-05 SDOH — ECONOMIC STABILITY: FOOD INSECURITY: WITHIN THE PAST 12 MONTHS, YOU WORRIED THAT YOUR FOOD WOULD RUN OUT BEFORE YOU GOT MONEY TO BUY MORE.: NEVER TRUE

## 2025-03-05 ASSESSMENT — PATIENT HEALTH QUESTIONNAIRE - PHQ9
1. LITTLE INTEREST OR PLEASURE IN DOING THINGS: NOT AT ALL
SUM OF ALL RESPONSES TO PHQ QUESTIONS 1-9: 0
2. FEELING DOWN, DEPRESSED OR HOPELESS: NOT AT ALL
SUM OF ALL RESPONSES TO PHQ QUESTIONS 1-9: 0

## 2025-03-05 NOTE — PROGRESS NOTES
Identified pt with two pt identifiers(name and ). Reviewed record in preparation for visit and have obtained necessary documentation. All patient medications has been reviewed.  Chief Complaint   Patient presents with    Follow-up       Health Maintenance Due   Topic    DTaP/Tdap/Td vaccine (1 - Tdap)    Shingles vaccine (1 of 2)    COVID-19 Vaccine ( season)    Annual Wellness Visit (Medicare)      Health Maintenance Review: Patient reminded of \"due or due soon\" health maintenance. I have asked the patient to contact his/her primary care provider (PCP) for follow-up on his/her health maintenance.    Wt Readings from Last 3 Encounters:   25 (!) 183.3 kg (404 lb 3.2 oz)   12/10/24 (!) 191.2 kg (421 lb 9.6 oz)   24 (!) 190.8 kg (420 lb 9.6 oz)     Temp Readings from Last 3 Encounters:   25 97.9 °F (36.6 °C)   12/10/24 97.8 °F (36.6 °C) (Oral)   24 98.3 °F (36.8 °C) (Oral)     BP Readings from Last 3 Encounters:   25 132/84   12/10/24 135/87   24 139/71     Pulse Readings from Last 3 Encounters:   25 (!) 110   12/10/24 91   24 85       1. \"Have you been to the ER, urgent care clinic since your last visit?  Hospitalized since your last visit?\" No    2. \"Have you seen or consulted any other health care providers outside of the [facility] since your last visit?\" Yes rheumatology      3. For patients aged 45-75: Has the patient had a colonoscopy / FIT/ Cologuard? Yes - Care Gap present. Most recent result on file    Patient is accompanied by self I have received verbal consent from Dustin Guy Jr. to discuss any/all medical information while they are present in the room.   
tamsulosin (FLOMAX) 0.4 MG capsule TAKE 1 CAPSULE BY MOUTH EVERY DAY    valsartan-hydroCHLOROthiazide (DIOVAN-HCT) 160-12.5 MG per tablet TAKE 1 TABLET BY MOUTH EVERY DAY    DULERA 100-5 MCG/ACT inhaler INHALE TWO PUFFS BY MOUTH EVERY DAY    pseudoephedrine (SUDAFED) 30 MG tablet Take 1 tablet by mouth every 4 hours as needed for Congestion    clobetasol (TEMOVATE) 0.05 % cream APPLY A SMALL AMOUNT TO AFFECTED AREA TWICE DAILY    clotrimazole (LOTRIMIN) 1 % cream Apply topically 2 times daily Apply topically 2 times daily.    vitamin B-12 (CYANOCOBALAMIN) 1000 MCG tablet Take 1 tablet by mouth daily    inFLIXimab (REMICADE) 100 MG injection Infuse 90 mLs intravenously See Admin Instructions    methotrexate Sodium 50 MG/2ML chemo injection Inject 1 mL into the skin once a week    cetirizine (ZYRTEC) 10 MG tablet Take by mouth as needed    cyclobenzaprine (FLEXERIL) 10 MG tablet Take by mouth 3 times daily as needed    diclofenac (VOLTAREN) 75 MG EC tablet Take by mouth 2 times daily    fluticasone (FLONASE) 50 MCG/ACT nasal spray 2 sprays by Nasal route daily    folic acid (FOLVITE) 1 MG tablet TAKE 1 TABLET BY MOUTH EVERY DAY    senna-docusate (PERICOLACE) 8.6-50 MG per tablet Take 1 tablet by mouth 2 times daily     No current facility-administered medications for this visit.

## 2025-05-12 ENCOUNTER — TELEPHONE (OUTPATIENT)
Facility: CLINIC | Age: 75
End: 2025-05-12

## 2025-05-12 NOTE — TELEPHONE ENCOUNTER
----- Message from Juliann METZ sent at 5/12/2025  9:25 AM EDT -----  Regarding: [facility] Appointment Request  [facility] Appointment Request    Patient needs appointment for [facility] Appointment Type: Existing Condition Follow Up.    Patient Requested Dates(s): any day  Patient Requested Time: afternoon   Provider Name: Dustin Yu MD    Reason for Appointment Request: Established Patient - Available appointments did not meet patient need  --------------------------------------------------------------------------------------------------------------------------    Relationship to Patient: Self     Call Back Information: OK to leave message on voicemail  Preferred Call Back Number: [phone]      Patient want to schedule an appointment for weight loss and updating medications.

## 2025-05-14 ENCOUNTER — OFFICE VISIT (OUTPATIENT)
Facility: CLINIC | Age: 75
End: 2025-05-14
Payer: MEDICARE

## 2025-05-14 VITALS
TEMPERATURE: 98.8 F | WEIGHT: 315 LBS | OXYGEN SATURATION: 94 % | RESPIRATION RATE: 16 BRPM | BODY MASS INDEX: 36.45 KG/M2 | HEART RATE: 76 BPM | DIASTOLIC BLOOD PRESSURE: 78 MMHG | SYSTOLIC BLOOD PRESSURE: 124 MMHG | HEIGHT: 78 IN

## 2025-05-14 DIAGNOSIS — E66.813 OBESITY, CLASS III, BMI 40-49.9 (MORBID OBESITY) (HCC): Primary | ICD-10-CM

## 2025-05-14 DIAGNOSIS — K59.00 CONSTIPATION, UNSPECIFIED CONSTIPATION TYPE: ICD-10-CM

## 2025-05-14 PROCEDURE — 3074F SYST BP LT 130 MM HG: CPT | Performed by: INTERNAL MEDICINE

## 2025-05-14 PROCEDURE — 99213 OFFICE O/P EST LOW 20 MIN: CPT | Performed by: INTERNAL MEDICINE

## 2025-05-14 PROCEDURE — 3017F COLORECTAL CA SCREEN DOC REV: CPT | Performed by: INTERNAL MEDICINE

## 2025-05-14 PROCEDURE — G8427 DOCREV CUR MEDS BY ELIG CLIN: HCPCS | Performed by: INTERNAL MEDICINE

## 2025-05-14 PROCEDURE — G8417 CALC BMI ABV UP PARAM F/U: HCPCS | Performed by: INTERNAL MEDICINE

## 2025-05-14 PROCEDURE — 1160F RVW MEDS BY RX/DR IN RCRD: CPT | Performed by: INTERNAL MEDICINE

## 2025-05-14 PROCEDURE — 1123F ACP DISCUSS/DSCN MKR DOCD: CPT | Performed by: INTERNAL MEDICINE

## 2025-05-14 PROCEDURE — 1125F AMNT PAIN NOTED PAIN PRSNT: CPT | Performed by: INTERNAL MEDICINE

## 2025-05-14 PROCEDURE — 1036F TOBACCO NON-USER: CPT | Performed by: INTERNAL MEDICINE

## 2025-05-14 PROCEDURE — 1159F MED LIST DOCD IN RCRD: CPT | Performed by: INTERNAL MEDICINE

## 2025-05-14 PROCEDURE — 3078F DIAST BP <80 MM HG: CPT | Performed by: INTERNAL MEDICINE

## 2025-05-14 ASSESSMENT — PATIENT HEALTH QUESTIONNAIRE - PHQ9
SUM OF ALL RESPONSES TO PHQ QUESTIONS 1-9: 0
1. LITTLE INTEREST OR PLEASURE IN DOING THINGS: NOT AT ALL
SUM OF ALL RESPONSES TO PHQ QUESTIONS 1-9: 0
2. FEELING DOWN, DEPRESSED OR HOPELESS: NOT AT ALL

## 2025-05-14 NOTE — PROGRESS NOTES
Identified pt with two pt identifiers(name and ). Reviewed record in preparation for visit and have obtained necessary documentation. All patient medications has been reviewed.  Chief Complaint   Patient presents with    Other     Discuss GLP1 medications     *Immunizations updated if available*    Health Maintenance Due   Topic    DTaP/Tdap/Td vaccine (1 - Tdap)    Shingles vaccine (1 of 2)    COVID-19 Vaccine ( season)    Annual Wellness Visit (Medicare)      Health Maintenance Review: Patient reminded of \"due or due soon\" health maintenance. I have asked the patient to contact his/her primary care provider (PCP) for follow-up on his/her health maintenance.    Wt Readings from Last 3 Encounters:   25 (!) 181.7 kg (400 lb 9.6 oz)   25 (!) 183.3 kg (404 lb 3.2 oz)   12/10/24 (!) 191.2 kg (421 lb 9.6 oz)     Temp Readings from Last 3 Encounters:   25 98.8 °F (37.1 °C)   25 97.9 °F (36.6 °C)   12/10/24 97.8 °F (36.6 °C) (Oral)     BP Readings from Last 3 Encounters:   25 124/78   25 132/84   12/10/24 135/87     Pulse Readings from Last 3 Encounters:   25 76   25 90   12/10/24 91       1. \"Have you been to the ER, urgent care clinic since your last visit?  Hospitalized since your last visit?\" No    2. \"Have you seen or consulted any other health care providers outside of the [facility] since your last visit?\"  Rheumatology       3. For patients aged 45-75: Has the patient had a colonoscopy / FIT/ Cologuard? Yes - Care Gap present. Most recent result on file    Patient is accompanied by self I have received verbal consent from Dustin Guy Jr. to discuss any/all medical information while they are present in the room.

## 2025-05-14 NOTE — PROGRESS NOTES
HISTORY OF PRESENT ILLNESS    Chief Complaint   Patient presents with    Other     Discuss GLP1 medications       Presents for follow-up  Labs done per Dr. Cantu rheumatology 4/23/25 reviewed.  Alkaline phosphatase 46, bilirubin 0.7, BUN 17, creatinine 0.9 calcium 8.6, potassium 4.3  Cholesterol 135, LDL 74, triglyceride 121  PSA ordered, not resulted.  WBC 5.6, hemoglobin 13.4, platelet 215  CRP 2 ESR 13    Obesity  Taking semaglutide from compounding pharmacy since 12/15/24  Taking equivalent of 0.5 mg weekly.  Wt Readings from Last 3 Encounters:   05/14/25 (!) 181.7 kg (400 lb 9.6 oz)   03/05/25 (!) 183.3 kg (404 lb 3.2 oz)   12/10/24 (!) 191.2 kg (421 lb 9.6 oz)   Lost 21 lb since 12/10/24    Body mass index is 44.01 kg/m².  No n/v/d  Report moderte constipation    Review of Systems   All other systems reviewed and are negative, except as noted in HPI    Past Medical and Surgical History   has a past medical history of Abnormal stress test, Ankylosing spondylitis (HCC), Benign essential HTN, BPH (benign prostatic hyperplasia), Chronic edema, Colon polyp, DJD (degenerative joint disease) of cervical spine, NELSON (dyspnea on exertion), History of basal cell cancer, History of bilateral knee replacement, History of DVT (deep vein thrombosis), History of vascular access device, Insomnia, Kidney stone, Lumbar spinal stenosis, Neuropathy, VERONICA on CPAP, Psoriasis, Rheumatoid arthritis of cervical spine (HCC), and SOB (shortness of breath).     has a past surgical history that includes Shoulder arthroscopy (Left); back surgery (2022); lumbar laminectomy (2022); Cardiac catheterization (01/03/2022); Cardiac catheterization (2014); Colonoscopy; Colonoscopy (10/15/2019); Mohs surgery (2013?); Total knee arthroplasty (Bilateral); Wrist fracture surgery (Right); Lithotripsy; and Colonoscopy (10/08/2024).     reports that he quit smoking about 39 years ago. His smoking use included cigarettes. He started smoking about 55

## (undated) DEVICE — GLOVE SURG SZ 7 L12IN FNGR THK79MIL GRN LTX FREE

## (undated) DEVICE — SUTURE VCRL SZ 1 L36IN ABSRB UD L36MM CT-1 1/2 CIR J947H

## (undated) DEVICE — HANDPIECE SET WITH COAXIAL HIGH FLOW TIP AND SUCTION TUBE: Brand: INTERPULSE

## (undated) DEVICE — YANKAUER,OPEN TIP,W/O VENT,STERILE: Brand: MEDLINE INDUSTRIES, INC.

## (undated) DEVICE — GLOVE ORTHO 8   MSG9480

## (undated) DEVICE — GLOVE SURG SZ 65 THK91MIL LTX FREE SYN POLYISOPRENE

## (undated) DEVICE — COVER,MAYO STAND,XL,STERILE: Brand: MEDLINE

## (undated) DEVICE — MAGNETIC INSTR DRAPE 20X16: Brand: MEDLINE INDUSTRIES, INC.

## (undated) DEVICE — SPONGE GZ W4XL4IN COT 12 PLY TYP VII WVN C FLD DSGN

## (undated) DEVICE — ANGIOGRAPHIC CATHETER: Brand: IMPULSE™

## (undated) DEVICE — SUTURE VCRL SZ 2-0 L27IN ABSRB UD L26MM CT-2 1/2 CIR J269H

## (undated) DEVICE — SUTURE STRATAFIX SYMMETRIC SZ 1 L18IN ABSRB VLT CT1 L36CM SXPP1A404

## (undated) DEVICE — SYSTEM SKIN CLOSURE 42CM DERMABOND PRINEO

## (undated) DEVICE — GLOVE SURG SZ 8 L12IN FNGR THK79MIL GRN LTX FREE

## (undated) DEVICE — NDL SPNE QNCKE 18GX3.5IN LF --

## (undated) DEVICE — TUBING, SUCTION, 1/4" X 10', STRAIGHT: Brand: MEDLINE

## (undated) DEVICE — COVER LT HNDL PLAS RIG 1 PER PK

## (undated) DEVICE — SUTURE VCRL SZ 0 L36IN ABSRB UD L40MM CT 1/2 CIR TAPERPOINT J958H

## (undated) DEVICE — TUBING PRSS MON L12IN PVC RIG NONEXPANDING M TO FEM CONN

## (undated) DEVICE — LAMINECTOMY-SFMC: Brand: MEDLINE INDUSTRIES, INC.

## (undated) DEVICE — TOOL 14MH30 LEGEND 14CM 3MM: Brand: MIDAS REX ™

## (undated) DEVICE — GUIDE CATH CONVEY 5FR JR4 -- 39228-686

## (undated) DEVICE — HEART CATH-SFMC: Brand: MEDLINE INDUSTRIES, INC.

## (undated) DEVICE — RADIFOCUS OPTITORQUE ANGIOGRAPHIC CATHETER: Brand: OPTITORQUE

## (undated) DEVICE — SOLUTION IRRIG 1000ML 0.9% SOD CHL USP POUR PLAS BTL

## (undated) DEVICE — ALCOHOL RUBBING ISO 16OZ 70%

## (undated) DEVICE — SYR 3ML LL TIP 1/10ML GRAD --

## (undated) DEVICE — CATHETER GUID 6FR L100CM DIA0.071IN NYL SHFT JR5.0 W/O SIDE

## (undated) DEVICE — FIXED CORE WIRE GUIDE SAFE-T-J, CURVED: Brand: COOK

## (undated) DEVICE — SUPPORT WRST AD W3.5XL9IN DIA14.5IN ART SFT ADJ HK AND LOOP

## (undated) DEVICE — SUTURE ABSORBABLE 3-0 PS-1 18 IN UD MONOCRYL + STRATAFIX SXMP1B102

## (undated) DEVICE — Device: Brand: JELCO

## (undated) DEVICE — CATHETER IV 14GA L1.25IN TEF STR HUB INTROCAN SFTY

## (undated) DEVICE — SYR 5ML 1/5 GRAD LL NSAF LF --

## (undated) DEVICE — ACCY PA100-A LEGEND LUB/DIFFUSER 4 PACK: Brand: MIDAS REX®

## (undated) DEVICE — SWAB CULT DBL W/O CHAR RAYON TIP AMIES GEL CLMN FOR COLL

## (undated) DEVICE — SUTURE VCRL SZ 2-0 L36IN ABSRB UD L36MM CT-1 1/2 CIR J945H

## (undated) DEVICE — CELLERATE RX 5 GM SURG PWD HYDROLYZED CLLGN

## (undated) DEVICE — SYR 20ML LL STRL LF --

## (undated) DEVICE — 450 ML BOTTLE OF 0.05% CHLORHEXIDINE GLUCONATE IN 99.95% STERILE WATER FOR IRRIGATION, USP AND APPLICATOR.: Brand: IRRISEPT ANTIMICROBIAL WOUND LAVAGE

## (undated) DEVICE — BIPOLAR FORCEPS CORD: Brand: VALLEYLAB

## (undated) DEVICE — TOWEL SURG W17XL27IN STD BLU COT NONFENESTRATED PREWASHED

## (undated) DEVICE — FLOSEAL HEMOSTATIC MATRIX, 10ML: Brand: FLOSEAL HEMOSTATIC MATRIX

## (undated) DEVICE — SYRINGE MED 10ML RED POLYCARB BRL FIX M LUER CONN FLAT GRP

## (undated) DEVICE — SYR 10ML LUER LOK 1/5ML GRAD --

## (undated) DEVICE — 4-PORT MANIFOLD: Brand: NEPTUNE 2

## (undated) DEVICE — SOLUTION IRRIG 3000ML 0.9% SOD CHL USP UROMATIC PLAS CONT

## (undated) DEVICE — GLOVE ORTHO 7 1/2   MSG9475

## (undated) DEVICE — GLOVE ORANGE PI 7 1/2   MSG9075

## (undated) DEVICE — RELI® BLUNT FILL NEEDLE, 18G X 1", 100EA/BX, 100BX/CS: Brand: RELI®

## (undated) DEVICE — CANISTER, RIGID, 3000CC: Brand: MEDLINE INDUSTRIES, INC.

## (undated) DEVICE — TR BAND RADIAL ARTERY COMPRESSION DEVICE: Brand: TR BAND

## (undated) DEVICE — TIP CLEANER: Brand: VALLEYLAB

## (undated) DEVICE — 1010 S-DRAPE TOWEL DRAPE 10/BX: Brand: STERI-DRAPE™

## (undated) DEVICE — GLIDESHEATH SLENDER STAINLESS STEEL KIT: Brand: GLIDESHEATH SLENDER

## (undated) DEVICE — SUTURE ABSRB BRAID COAT UD CT NO 1 36IN VCRL J959H

## (undated) DEVICE — Z DUPLICATE USE 2275497 DRSG POSTOP PRMSL AG 3.5X6IN

## (undated) DEVICE — WILSON FRAME STYLE POSITIONING KITS - 	FRAME PAD SLEEVES (SET OF 2) , DRAPE PROTECTOR, BAR PROTECTOR 7" COMFORT FOAM HEADREST, LAMINECTOMY ARM CRADLES: Brand: SOULE MEDICAL